# Patient Record
Sex: FEMALE | Race: WHITE | NOT HISPANIC OR LATINO | Employment: FULL TIME | URBAN - METROPOLITAN AREA
[De-identification: names, ages, dates, MRNs, and addresses within clinical notes are randomized per-mention and may not be internally consistent; named-entity substitution may affect disease eponyms.]

---
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
## 2018-05-17 ENCOUNTER — HOSPITAL ENCOUNTER (EMERGENCY)
Facility: HOSPITAL | Age: 18
Discharge: HOME | End: 2018-05-17
Attending: EMERGENCY MEDICINE | Admitting: EMERGENCY MEDICINE

## 2018-05-17 VITALS
DIASTOLIC BLOOD PRESSURE: 96 MMHG | TEMPERATURE: 98.1 F | WEIGHT: 102 LBS | SYSTOLIC BLOOD PRESSURE: 150 MMHG | HEIGHT: 62 IN | HEART RATE: 101 BPM | OXYGEN SATURATION: 98 % | RESPIRATION RATE: 16 BRPM | BODY MASS INDEX: 18.77 KG/M2

## 2018-05-17 DIAGNOSIS — R31.9 HEMATURIA, UNSPECIFIED TYPE: ICD-10-CM

## 2018-05-17 DIAGNOSIS — N39.0 ACUTE UTI: Primary | ICD-10-CM

## 2018-05-17 LAB
B-HCG UR QL: NEGATIVE
BACTERIA UR QL AUTO: ABNORMAL /HPF
BILIRUB UR QL STRIP: NEGATIVE
CLARITY UR: ABNORMAL
COLOR UR: YELLOW
GLUCOSE UR STRIP-MCNC: NEGATIVE MG/DL
HGB UR QL STRIP.AUTO: ABNORMAL
HYALINE CASTS UR QL AUTO: ABNORMAL /LPF
KETONES UR QL STRIP: NEGATIVE
LEUKOCYTE ESTERASE UR QL STRIP.AUTO: ABNORMAL
NITRITE UR QL STRIP: POSITIVE
PH UR STRIP.AUTO: 5.5 [PH] (ref 5–8)
PROT UR QL STRIP: ABNORMAL
RBC # UR: ABNORMAL /HPF
REF LAB TEST METHOD: ABNORMAL
SP GR UR STRIP: 1.02 (ref 1–1.03)
SQUAMOUS #/AREA URNS HPF: ABNORMAL /HPF
UROBILINOGEN UR QL STRIP: ABNORMAL
WBC UR QL AUTO: ABNORMAL /HPF

## 2018-05-17 PROCEDURE — 87186 SC STD MICRODIL/AGAR DIL: CPT | Performed by: EMERGENCY MEDICINE

## 2018-05-17 PROCEDURE — 87086 URINE CULTURE/COLONY COUNT: CPT | Performed by: EMERGENCY MEDICINE

## 2018-05-17 PROCEDURE — 81001 URINALYSIS AUTO W/SCOPE: CPT | Performed by: EMERGENCY MEDICINE

## 2018-05-17 PROCEDURE — 81025 URINE PREGNANCY TEST: CPT | Performed by: EMERGENCY MEDICINE

## 2018-05-17 PROCEDURE — 99283 EMERGENCY DEPT VISIT LOW MDM: CPT

## 2018-05-17 RX ORDER — PHENAZOPYRIDINE HYDROCHLORIDE 100 MG/1
100 TABLET, FILM COATED ORAL 3 TIMES DAILY PRN
Qty: 6 TABLET | Refills: 0 | Status: SHIPPED | OUTPATIENT
Start: 2018-05-17 | End: 2018-09-26

## 2018-05-17 RX ORDER — CEPHALEXIN 500 MG/1
500 CAPSULE ORAL 4 TIMES DAILY
Qty: 28 CAPSULE | Refills: 0 | Status: SHIPPED | OUTPATIENT
Start: 2018-05-17 | End: 2018-05-24

## 2018-05-17 NOTE — DISCHARGE INSTRUCTIONS
You are advised to follow closely with Dr. Andrade in 2-3 days for recheck, urine culture results, final results of lab work, and further testing/treatment as needed.    Drink at least 8-10 glasses of water daily.  Take probiotics during and after antibiotic use.  Some antibiotics may decrease effectiveness of birth control therefore you must use an additional method of birth control or abstinence while taking antibiotics    Please return to the emergency department immediately with chest pain different than usual for you, shortness of air, abdominal pain, persistent vomiting/fever, blood in emesis or stool, lightheadedness/fainting, problems with speech, one sided weakness/numbness, new incontinence, problems with vision,  or for worsening of symptoms or other concerns.

## 2018-05-17 NOTE — ED PROVIDER NOTES
EMERGENCY DEPARTMENT ENCOUNTER    CHIEF COMPLAINT  Chief Complaint: blood in urine  History given by: pt  History limited by: nothing  Room Number: 45/45  PMD: Usama Andrade MD      HPI:  Pt is a 17 y.o. female who presents complaining of blood in urine. Pt reports that she had an increased frequency and urgency starting last night, including 5x last night and 7-10x this morning. The last time she urinating at work she noticed blood and it burned. She has minor suprapubic abd pain. She denies any other sx. Her last day of menstrual period was May 1st, but she does not believe she could be pregnant. She does not smoke, drink, or take any drugs.    Duration:  1 day  Onset: gradual  Timing: intermittent  Location: urinary sx  Radiation: none  Quality: blood and burning sensation  Intensity/Severity: moderate  Progression: worsening  Associated Symptoms: dysuria, abd pain, frequency, urgency  Aggravating Factors: none  Alleviating Factors: none  Previous Episodes: No previous episodes noted.  Treatment before arrival: No tx prior to arrival noted.    PAST MEDICAL HISTORY  Active Ambulatory Problems     Diagnosis Date Noted   • No Active Ambulatory Problems     Resolved Ambulatory Problems     Diagnosis Date Noted   • No Resolved Ambulatory Problems     Past Medical History:   Diagnosis Date   • Anemia    • Hypertension        PAST SURGICAL HISTORY  History reviewed. No pertinent surgical history.    FAMILY HISTORY  History reviewed. No pertinent family history.    SOCIAL HISTORY  Social History     Social History   • Marital status: Single     Spouse name: N/A   • Number of children: N/A   • Years of education: N/A     Occupational History   • Not on file.     Social History Main Topics   • Smoking status: Current Every Day Smoker     Packs/day: 0.50     Years: 1.00     Types: Cigarettes   • Smokeless tobacco: Not on file   • Alcohol use Yes   • Drug use: No   • Sexual activity: Not on file     Other Topics  Concern   • Not on file     Social History Narrative   • No narrative on file       ALLERGIES  Patient has no known allergies.    REVIEW OF SYSTEMS  Review of Systems   Constitutional: Negative for chills and fever.   HENT: Negative for rhinorrhea and sore throat.    Eyes: Negative for visual disturbance.   Respiratory: Negative for cough and shortness of breath.    Cardiovascular: Negative for chest pain, palpitations and leg swelling.   Gastrointestinal: Positive for abdominal pain (suprapubic). Negative for diarrhea and vomiting.   Endocrine: Negative.    Genitourinary: Positive for dysuria, frequency, hematuria (x1) and urgency. Negative for decreased urine volume.   Musculoskeletal: Negative for neck pain.   Skin: Negative for rash.   Neurological: Negative for syncope and headaches.   Psychiatric/Behavioral: Negative.    All other systems reviewed and are negative.      PHYSICAL EXAM  ED Triage Vitals   Temp Heart Rate Resp BP SpO2   05/17/18 1359 05/17/18 1359 05/17/18 1359 05/17/18 1414 05/17/18 1359   98.1 °F (36.7 °C) 101 16 150/96 98 %      Temp src Heart Rate Source Patient Position BP Location FiO2 (%)   05/17/18 1359 -- -- -- --   Tympanic           Physical Exam   Constitutional: She is oriented to person, place, and time.  Non-toxic appearance. She appears distressed (mild).   HENT:   Head: Normocephalic and atraumatic.   Eyes: EOM are normal.   Neck: Normal range of motion. Neck supple.   Cardiovascular: Normal rate, regular rhythm, normal heart sounds and intact distal pulses.    No murmur heard.  Pulses:       Posterior tibial pulses are 2+ on the right side, and 2+ on the left side.   Pulmonary/Chest: Effort normal and breath sounds normal. No respiratory distress.   Abdominal: Soft. Bowel sounds are normal. There is tenderness in the suprapubic area. There is no rebound, no guarding and no CVA tenderness.   Musculoskeletal: Normal range of motion. She exhibits no edema.   Neurological: She is  alert and oriented to person, place, and time.   Skin: Skin is warm and dry.   Psychiatric: Affect normal.   Nursing note and vitals reviewed.      LAB RESULTS  Lab Results (last 24 hours)     Procedure Component Value Units Date/Time    Urinalysis With / Culture If Indicated - Urine, Clean Catch [[id number]]  (Abnormal) Collected:  05/17/18 1433    Specimen:  Urine from Urine, Clean Catch Updated:  05/17/18 1457     Color, UA Yellow     Appearance, UA Cloudy (A)     pH, UA 5.5     Specific Gravity, UA 1.017     Glucose, UA Negative     Ketones, UA Negative     Bilirubin, UA Negative     Blood, UA Large (3+) (A)     Protein, UA >=300 mg/dL (3+) (A)     Leuk Esterase, UA Moderate (2+) (A)     Nitrite, UA Positive (A)     Urobilinogen, UA 0.2 E.U./dL    Pregnancy, Urine - Urine, Clean Catch [[id number]]  (Normal) Collected:  05/17/18 1433    Specimen:  Urine from Urine, Clean Catch Updated:  05/17/18 1453     HCG, Urine QL Negative    Urinalysis, Microscopic Only - Urine, Clean Catch [[id number]]  (Abnormal) Collected:  05/17/18 1433    Specimen:  Urine from Urine, Clean Catch Updated:  05/17/18 1459     RBC, UA Too Numerous to Count (A) /HPF      WBC, UA Too Numerous to Count (A) /HPF      Bacteria, UA 4+ (A) /HPF      Squamous Epithelial Cells, UA 0-2 /HPF      Hyaline Casts, UA 3-6 /LPF      Methodology Automated Microscopy    Urine Culture - Urine, Urine, Clean Catch [[id number]] Collected:  05/17/18 1433    Specimen:  Urine from Urine, Clean Catch Updated:  05/17/18 1457          I ordered the above labs and reviewed the results      PROCEDURES  Procedures      PROGRESS AND CONSULTS     1444- Initial pt check. I advised her that we will check her urinalysis when it returns, but she likely has a UTI. I encouraged her to consume large amounts of fluids, wipe front to back, and to cleanse before and after intercourse as well.    1530- Pt recheck. I explained the results, noting that pt has a UTI. I will provide pt with  abx upon discharge, and I encouraged her to take a probiotic while on abx. In addition, I warned her that abx can decrease the effect of oral contraceptive, so I encouraged her to utilize an alternative method while on abx. I encouraged the pt to consume copious amounts of water as well. Pt understands and agrees with the plan, all questions answered.    MEDICAL DECISION MAKING  Results were reviewed/discussed with the patient and they were also made aware of online access. Pt also made aware that some labs, such as cultures, will not be resulted during ER visit and follow up with PMD is necessary.     MDM  Number of Diagnoses or Management Options  Acute UTI:      Amount and/or Complexity of Data Reviewed  Clinical lab tests: ordered and reviewed (Urinalysis- RBC- too numerous- WBC- too numerous, Bacteria- 4+)  Decide to obtain previous medical records or to obtain history from someone other than the patient: yes  Review and summarize past medical records: yes           DIAGNOSIS  Final diagnoses:   Acute UTI   Hematuria, unspecified type       DISPOSITION  DISCHARGE    Patient discharged in stable condition.    Reviewed implications of results, diagnosis, meds, responsibility to follow up, warning signs and symptoms of possible worsening, potential complications and reasons to return to ER, including severe worsening of sx or development of associated sx.    Patient/Family voiced understanding of above instructions.    Discussed plan for discharge, as there is no emergent indication for admission. Patient referred to primary care provider for BP management due to today's BP. Pt/family is agreeable and understands need for follow up and repeat testing.  Pt is aware that discharge does not mean that nothing is wrong but it indicates no emergency is present that requires admission and they must continue care with follow-up as given below or physician of their choice.     FOLLOW-UP  Usama Andrade MD  [id number]  [facility] [address]  [phone]    Schedule an appointment as soon as possible for a visit in 3 days  EVEN IF WELL         Medication List      New Prescriptions    cephalexin 500 MG capsule  Commonly known as:  KEFLEX  Take 1 capsule by mouth 4 (Four) Times a Day for 7 days.     phenazopyridine 100 MG tablet  Commonly known as:  PYRIDIUM  Take 1 tablet by mouth 3 (Three) Times a Day As Needed for bladder spasms.   Must fill keflex              Latest Documented Vital Signs:  As of 3:26 PM  BP- 150/96 HR- 101 Temp- 98.1 °F (36.7 °C) (Tympanic) O2 sat- 98%    --  Documentation assistance provided by aleisha Srivastava for Dr. Neal.  Information recorded by the scribe was done at my direction and has been verified and validated by me.       Tejas Srivastava  05/17/18 1526       Antonette Neal MD  05/19/18 0022

## 2018-05-19 LAB — BACTERIA SPEC AEROBE CULT: ABNORMAL

## 2018-09-26 ENCOUNTER — HOSPITAL ENCOUNTER (EMERGENCY)
Facility: HOSPITAL | Age: 18
Discharge: HOME | End: 2018-09-26
Attending: EMERGENCY MEDICINE | Admitting: EMERGENCY MEDICINE

## 2018-09-26 VITALS
BODY MASS INDEX: 17.48 KG/M2 | DIASTOLIC BLOOD PRESSURE: 99 MMHG | HEART RATE: 81 BPM | RESPIRATION RATE: 16 BRPM | SYSTOLIC BLOOD PRESSURE: 130 MMHG | OXYGEN SATURATION: 97 % | HEIGHT: 62 IN | WEIGHT: 95 LBS | TEMPERATURE: 97.5 F

## 2018-09-26 DIAGNOSIS — A08.4 VIRAL GASTROENTERITIS: Primary | ICD-10-CM

## 2018-09-26 LAB
ALBUMIN SERPL-MCNC: 4.1 G/DL (ref 3.5–5.2)
ALBUMIN/GLOB SERPL: 1.5 G/DL
ALP SERPL-CCNC: 64 U/L (ref 39–117)
ALT SERPL W P-5'-P-CCNC: 11 U/L (ref 1–33)
ANION GAP SERPL CALCULATED.3IONS-SCNC: 10.7 MMOL/L
AST SERPL-CCNC: 10 U/L (ref 1–32)
BASOPHILS # BLD AUTO: 0.02 10*3/MM3 (ref 0–0.2)
BASOPHILS NFR BLD AUTO: 0.4 % (ref 0–1.5)
BILIRUB SERPL-MCNC: <0.2 MG/DL (ref 0.1–1.2)
BILIRUB UR QL STRIP: NEGATIVE
BUN BLD-MCNC: 12 MG/DL (ref 6–20)
BUN/CREAT SERPL: 16.7 (ref 7–25)
CALCIUM SPEC-SCNC: 9.1 MG/DL (ref 8.6–10.5)
CHLORIDE SERPL-SCNC: 107 MMOL/L (ref 98–107)
CLARITY UR: CLEAR
CO2 SERPL-SCNC: 23.3 MMOL/L (ref 22–29)
COLOR UR: YELLOW
CREAT BLD-MCNC: 0.72 MG/DL (ref 0.57–1)
DEPRECATED RDW RBC AUTO: 43.1 FL (ref 37–54)
EOSINOPHIL # BLD AUTO: 0.06 10*3/MM3 (ref 0–0.7)
EOSINOPHIL NFR BLD AUTO: 1.1 % (ref 0.3–6.2)
ERYTHROCYTE [DISTWIDTH] IN BLOOD BY AUTOMATED COUNT: 13.6 % (ref 11.7–13)
GFR SERPL CREATININE-BSD FRML MDRD: 103 ML/MIN/1.73
GLOBULIN UR ELPH-MCNC: 2.8 GM/DL
GLUCOSE BLD-MCNC: 108 MG/DL (ref 65–99)
GLUCOSE UR STRIP-MCNC: NEGATIVE MG/DL
HCG SERPL QL: NEGATIVE
HCT VFR BLD AUTO: 42.6 % (ref 35.6–45.5)
HGB BLD-MCNC: 13.6 G/DL (ref 11.9–15.5)
HGB UR QL STRIP.AUTO: NEGATIVE
HOLD SPECIMEN: NORMAL
HOLD SPECIMEN: NORMAL
IMM GRANULOCYTES # BLD: 0.02 10*3/MM3 (ref 0–0.03)
IMM GRANULOCYTES NFR BLD: 0.4 % (ref 0–0.5)
KETONES UR QL STRIP: NEGATIVE
LEUKOCYTE ESTERASE UR QL STRIP.AUTO: NEGATIVE
LIPASE SERPL-CCNC: 36 U/L (ref 13–60)
LYMPHOCYTES # BLD AUTO: 1.92 10*3/MM3 (ref 0.9–4.8)
LYMPHOCYTES NFR BLD AUTO: 34 % (ref 19.6–45.3)
MCH RBC QN AUTO: 27.6 PG (ref 26.9–32)
MCHC RBC AUTO-ENTMCNC: 31.9 G/DL (ref 32.4–36.3)
MCV RBC AUTO: 86.4 FL (ref 80.5–98.2)
MONOCYTES # BLD AUTO: 0.39 10*3/MM3 (ref 0.2–1.2)
MONOCYTES NFR BLD AUTO: 6.9 % (ref 5–12)
NEUTROPHILS # BLD AUTO: 3.25 10*3/MM3 (ref 1.9–8.1)
NEUTROPHILS NFR BLD AUTO: 57.6 % (ref 42.7–76)
NITRITE UR QL STRIP: NEGATIVE
PH UR STRIP.AUTO: 5.5 [PH] (ref 5–8)
PLATELET # BLD AUTO: 213 10*3/MM3 (ref 140–500)
PMV BLD AUTO: 12 FL (ref 6–12)
POTASSIUM BLD-SCNC: 4.5 MMOL/L (ref 3.5–5.2)
PROT SERPL-MCNC: 6.9 G/DL (ref 6–8.5)
PROT UR QL STRIP: NEGATIVE
RBC # BLD AUTO: 4.93 10*6/MM3 (ref 3.9–5.2)
SODIUM BLD-SCNC: 141 MMOL/L (ref 136–145)
SP GR UR STRIP: 1.01 (ref 1–1.03)
UROBILINOGEN UR QL STRIP: NORMAL
WBC NRBC COR # BLD: 5.64 10*3/MM3 (ref 4.5–10.7)
WHOLE BLOOD HOLD SPECIMEN: NORMAL
WHOLE BLOOD HOLD SPECIMEN: NORMAL

## 2018-09-26 PROCEDURE — 36415 COLL VENOUS BLD VENIPUNCTURE: CPT

## 2018-09-26 PROCEDURE — 83690 ASSAY OF LIPASE: CPT | Performed by: PHYSICIAN ASSISTANT

## 2018-09-26 PROCEDURE — 80053 COMPREHEN METABOLIC PANEL: CPT | Performed by: PHYSICIAN ASSISTANT

## 2018-09-26 PROCEDURE — 84703 CHORIONIC GONADOTROPIN ASSAY: CPT | Performed by: PHYSICIAN ASSISTANT

## 2018-09-26 PROCEDURE — 85025 COMPLETE CBC W/AUTO DIFF WBC: CPT | Performed by: PHYSICIAN ASSISTANT

## 2018-09-26 PROCEDURE — 81003 URINALYSIS AUTO W/O SCOPE: CPT | Performed by: PHYSICIAN ASSISTANT

## 2018-09-26 PROCEDURE — 99283 EMERGENCY DEPT VISIT LOW MDM: CPT

## 2018-09-26 RX ORDER — DICYCLOMINE HCL 20 MG
20 TABLET ORAL EVERY 6 HOURS PRN
Qty: 20 TABLET | Refills: 0 | OUTPATIENT
Start: 2018-09-26 | End: 2018-11-29

## 2018-09-26 RX ORDER — ONDANSETRON 4 MG/1
4 TABLET, ORALLY DISINTEGRATING ORAL EVERY 6 HOURS PRN
Qty: 10 TABLET | Refills: 0 | OUTPATIENT
Start: 2018-09-26 | End: 2018-11-29

## 2018-11-29 ENCOUNTER — HOSPITAL ENCOUNTER (EMERGENCY)
Facility: HOSPITAL | Age: 18
Discharge: HOME | End: 2018-11-29
Attending: EMERGENCY MEDICINE | Admitting: EMERGENCY MEDICINE

## 2018-11-29 VITALS
HEART RATE: 60 BPM | WEIGHT: 97.1 LBS | RESPIRATION RATE: 16 BRPM | OXYGEN SATURATION: 99 % | BODY MASS INDEX: 17.87 KG/M2 | SYSTOLIC BLOOD PRESSURE: 128 MMHG | DIASTOLIC BLOOD PRESSURE: 90 MMHG | TEMPERATURE: 98.1 F | HEIGHT: 62 IN

## 2018-11-29 DIAGNOSIS — S61.309A PARTIAL AVULSION OF FINGERNAIL, INITIAL ENCOUNTER: Primary | ICD-10-CM

## 2018-11-29 PROCEDURE — 99283 EMERGENCY DEPT VISIT LOW MDM: CPT

## 2018-11-29 PROCEDURE — 11730 AVULSION NAIL PLATE SIMPLE 1: CPT

## 2018-11-29 RX ORDER — BUPIVACAINE HYDROCHLORIDE 5 MG/ML
10 INJECTION, SOLUTION EPIDURAL; INTRACAUDAL ONCE
Status: COMPLETED | OUTPATIENT
Start: 2018-11-29 | End: 2018-11-29

## 2018-11-29 RX ADMIN — BUPIVACAINE HYDROCHLORIDE 10 ML: 5 INJECTION, SOLUTION EPIDURAL; INTRACAUDAL at 08:38

## 2019-10-15 ENCOUNTER — HOSPITAL ENCOUNTER (EMERGENCY)
Facility: HOSPITAL | Age: 19
Discharge: HOME | End: 2019-10-15
Attending: EMERGENCY MEDICINE | Admitting: EMERGENCY MEDICINE

## 2019-10-15 VITALS
SYSTOLIC BLOOD PRESSURE: 133 MMHG | TEMPERATURE: 99.5 F | OXYGEN SATURATION: 98 % | WEIGHT: 104 LBS | HEIGHT: 62 IN | BODY MASS INDEX: 19.14 KG/M2 | RESPIRATION RATE: 14 BRPM | DIASTOLIC BLOOD PRESSURE: 84 MMHG | HEART RATE: 80 BPM

## 2019-10-15 DIAGNOSIS — N72 CERVICITIS: Primary | ICD-10-CM

## 2019-10-15 DIAGNOSIS — K59.00 CONSTIPATION, UNSPECIFIED CONSTIPATION TYPE: ICD-10-CM

## 2019-10-15 DIAGNOSIS — N39.0 URINARY TRACT INFECTION, ACUTE: ICD-10-CM

## 2019-10-15 LAB
B-HCG UR QL: NEGATIVE
BACTERIA UR QL AUTO: ABNORMAL /HPF
BILIRUB UR QL STRIP: NEGATIVE
CLARITY UR: CLEAR
CLUE CELLS SPEC QL WET PREP: ABNORMAL
COLOR UR: YELLOW
GLUCOSE UR STRIP-MCNC: NEGATIVE MG/DL
HGB UR QL STRIP.AUTO: ABNORMAL
HYALINE CASTS UR QL AUTO: ABNORMAL /LPF
HYDATID CYST SPEC WET PREP: ABNORMAL
KETONES UR QL STRIP: NEGATIVE
LEUKOCYTE ESTERASE UR QL STRIP.AUTO: ABNORMAL
NITRITE UR QL STRIP: NEGATIVE
PH UR STRIP.AUTO: 7.5 [PH] (ref 5–8)
PROT UR QL STRIP: NEGATIVE
RBC # UR: ABNORMAL /HPF
REF LAB TEST METHOD: ABNORMAL
SP GR UR STRIP: 1.01 (ref 1–1.03)
SQUAMOUS #/AREA URNS HPF: ABNORMAL /HPF
T VAGINALIS SPEC QL WET PREP: ABNORMAL
UROBILINOGEN UR QL STRIP: ABNORMAL
WBC SPEC QL WET PREP: ABNORMAL
WBC UR QL AUTO: ABNORMAL /HPF
YEAST GENITAL QL WET PREP: ABNORMAL

## 2019-10-15 PROCEDURE — 87591 N.GONORRHOEAE DNA AMP PROB: CPT | Performed by: EMERGENCY MEDICINE

## 2019-10-15 PROCEDURE — 87491 CHLMYD TRACH DNA AMP PROBE: CPT | Performed by: EMERGENCY MEDICINE

## 2019-10-15 PROCEDURE — 25010000002 CEFTRIAXONE PER 250 MG: Performed by: EMERGENCY MEDICINE

## 2019-10-15 PROCEDURE — 96372 THER/PROPH/DIAG INJ SC/IM: CPT

## 2019-10-15 PROCEDURE — 81025 URINE PREGNANCY TEST: CPT | Performed by: EMERGENCY MEDICINE

## 2019-10-15 PROCEDURE — 99284 EMERGENCY DEPT VISIT MOD MDM: CPT

## 2019-10-15 PROCEDURE — 87086 URINE CULTURE/COLONY COUNT: CPT | Performed by: EMERGENCY MEDICINE

## 2019-10-15 PROCEDURE — 81001 URINALYSIS AUTO W/SCOPE: CPT | Performed by: EMERGENCY MEDICINE

## 2019-10-15 PROCEDURE — 87210 SMEAR WET MOUNT SALINE/INK: CPT | Performed by: EMERGENCY MEDICINE

## 2019-10-15 RX ORDER — CEFTRIAXONE SODIUM 250 MG/1
250 INJECTION, POWDER, FOR SOLUTION INTRAMUSCULAR; INTRAVENOUS ONCE
Status: COMPLETED | OUTPATIENT
Start: 2019-10-15 | End: 2019-10-15

## 2019-10-15 RX ORDER — NITROFURANTOIN 25; 75 MG/1; MG/1
100 CAPSULE ORAL 2 TIMES DAILY
Qty: 10 CAPSULE | Refills: 0 | Status: SHIPPED | OUTPATIENT
Start: 2019-10-15 | End: 2020-07-17

## 2019-10-15 RX ORDER — LIDOCAINE HYDROCHLORIDE 10 MG/ML
0.9 INJECTION, SOLUTION EPIDURAL; INFILTRATION; INTRACAUDAL; PERINEURAL ONCE
Status: COMPLETED | OUTPATIENT
Start: 2019-10-15 | End: 2019-10-15

## 2019-10-15 RX ORDER — AZITHROMYCIN 250 MG/1
1000 TABLET, FILM COATED ORAL ONCE
Status: COMPLETED | OUTPATIENT
Start: 2019-10-15 | End: 2019-10-15

## 2019-10-15 RX ADMIN — LIDOCAINE HYDROCHLORIDE 0.9 ML: 10 INJECTION, SOLUTION EPIDURAL; INFILTRATION; INTRACAUDAL; PERINEURAL at 17:11

## 2019-10-15 RX ADMIN — AZITHROMYCIN 1000 MG: 250 TABLET, FILM COATED ORAL at 17:10

## 2019-10-15 RX ADMIN — CEFTRIAXONE SODIUM 250 MG: 250 INJECTION, POWDER, FOR SOLUTION INTRAMUSCULAR; INTRAVENOUS at 17:11

## 2019-10-15 NOTE — ED NOTES
Patient to er from home with c/o burning on urination and constipation.      Wild Cordero RN  10/15/19 9097

## 2019-10-15 NOTE — DISCHARGE INSTRUCTIONS
You are advised to follow closely with Dr. Andrade and Dr. Collins or gynecologist of your choice in 2-3 days for recheck, pelvic culture results, urine culture results final results of lab work and imaging testing, and further testing/treatment as needed.    Drink at least 8 glasses of water daily.  Follow-up for pelvic culture results, if they are positive your sexual partners need to be treated tested and treated.  If these tests are positive you and your partners also need to be tested and treated as needed for other STDs such as hepatitis, syphilis, HIV.  Practice safe sex.    Eat a diet full of fruits and vegetables.    Please return to the emergency department immediately with chest pain different than usual for you, shortness of air, abdominal pain, persistent vomiting/fever, blood in emesis or stool, lightheadedness/fainting, problems with speech, one sided weakness/numbness, new incontinence, problems with vision,  or for worsening of symptoms or other concerns.

## 2019-10-15 NOTE — ED PROVIDER NOTES
EMERGENCY DEPARTMENT ENCOUNTER    CHIEF COMPLAINT  Chief Complaint: Dysuria  History given by: Patient  History limited by:   Room Number: 01/01  PMD: Usama Andrade MD      HPI:  Pt is a 19 y.o. female who presents complaining of dysuria that began 3 days ago. She also reports having some frequency and urgency. She states having some vaginal discharge. Pt reports a subjective fever. She has been nauseated. Pt also c/o constipation. Last BM was 2 days ago. She has had a cough as well that is nonproductive. No CP, fever, vomiting, SOA, rash, lesion. There is possible STD exposure.     Duration: 3 days  Onset: sudden  Timing: intermittent  Quality: burning  Intensity/Severity: moderate  Progression: unchanged  Associated Symptoms: frequency, urgency, vaginal discharge, constipation, cough, fever (subjective).  Aggravating Factors: urination  Alleviating Factors: none  Previous Episodes: hx of UTI  Treatment before arrival: none    PAST MEDICAL HISTORY  Active Ambulatory Problems     Diagnosis Date Noted   • No Active Ambulatory Problems     Resolved Ambulatory Problems     Diagnosis Date Noted   • No Resolved Ambulatory Problems     Past Medical History:   Diagnosis Date   • Anemia    • Hypertension    • UTI (urinary tract infection)        PAST SURGICAL HISTORY  History reviewed. No pertinent surgical history.    FAMILY HISTORY  History reviewed. No pertinent family history.    SOCIAL HISTORY  Social History     Socioeconomic History   • Marital status: Single     Spouse name: Not on file   • Number of children: Not on file   • Years of education: Not on file   • Highest education level: Not on file   Tobacco Use   • Smoking status: Current Every Day Smoker     Packs/day: 0.10     Years: 1.00     Pack years: 0.10     Types: Cigarettes   Substance and Sexual Activity   • Alcohol use: Yes   • Drug use: No       ALLERGIES  Patient has no known allergies.    REVIEW OF SYSTEMS  Review of Systems    Constitutional: Positive for fever (subjective).   HENT: Negative for sore throat.    Eyes: Negative.    Respiratory: Positive for cough. Negative for shortness of breath.    Cardiovascular: Negative for chest pain.   Gastrointestinal: Positive for constipation. Negative for abdominal pain, diarrhea and vomiting.   Genitourinary: Positive for dysuria, frequency, urgency and vaginal discharge.   Musculoskeletal: Negative for neck pain.   Skin: Negative for rash.   Allergic/Immunologic: Negative.    Neurological: Negative for weakness, numbness and headaches.   Hematological: Negative.    Psychiatric/Behavioral: Negative.    All other systems reviewed and are negative.      PHYSICAL EXAM  ED Triage Vitals   Temp Heart Rate Resp BP SpO2   10/15/19 1450 10/15/19 1450 10/15/19 1509 10/15/19 1509 10/15/19 1450   99.5 °F (37.5 °C) 78 14 129/95 98 %      Temp src Heart Rate Source Patient Position BP Location FiO2 (%)   10/15/19 1450 -- -- -- --   Tympanic           Physical Exam   Constitutional: She is oriented to person, place, and time.  Non-toxic appearance. She appears distressed.   HENT:   Head: Normocephalic and atraumatic.   Eyes: EOM are normal. Pupils are equal, round, and reactive to light.   Neck: Normal range of motion. Neck supple.   Cardiovascular: Normal rate, regular rhythm, normal heart sounds and intact distal pulses.   Pulses:       Dorsalis pedis pulses are 2+ on the right side, and 2+ on the left side.        Posterior tibial pulses are 2+ on the right side, and 2+ on the left side.   Pulmonary/Chest: Effort normal and breath sounds normal. No respiratory distress.   Abdominal: Soft. Bowel sounds are normal. There is tenderness (mild) in the suprapubic area. There is no rebound, no guarding and no CVA tenderness.   Genitourinary: Right adnexa normal and left adnexa normal. Uterus is not enlarged. Cervix exhibits motion tenderness and tenderness. Right adnexum displays no mass and no tenderness. Left  adnexum displays no mass and no tenderness. Yellow and vaginal discharge found.   Genitourinary Comments: External genitalia nml. Chaperone present during  exam.    Musculoskeletal: Normal range of motion. She exhibits no edema or deformity.        Lumbar back: She exhibits tenderness (paravertebral bilaterally). She exhibits no bony tenderness.   Neurological: She is alert and oriented to person, place, and time. She has normal sensation and normal strength.   Skin: Skin is warm and dry. No rash noted.   Psychiatric: Mood and affect normal.   Nursing note and vitals reviewed.      LAB RESULTS  Lab Results (last 24 hours)     Procedure Component Value Units Date/Time    Urinalysis With Microscopic If Indicated (No Culture) - Urine, Clean Catch [[id number]]  (Abnormal) Collected:  10/15/19 1511    Specimen:  Urine, Clean Catch Updated:  10/15/19 1528     Color, UA Yellow     Appearance, UA Clear     pH, UA 7.5     Specific Gravity, UA 1.011     Glucose, UA Negative     Ketones, UA Negative     Bilirubin, UA Negative     Blood, UA Trace     Protein, UA Negative     Leuk Esterase, UA Small (1+)     Nitrite, UA Negative     Urobilinogen, UA 0.2 E.U./dL    Pregnancy, Urine - Urine, Clean Catch [[id number]]  (Normal) Collected:  10/15/19 1511    Specimen:  Urine, Clean Catch Updated:  10/15/19 1534     HCG, Urine QL Negative    Urinalysis, Microscopic Only - Urine, Clean Catch [[id number]]  (Abnormal) Collected:  10/15/19 1511    Specimen:  Urine, Clean Catch Updated:  10/15/19 1528     RBC, UA 0-2 /HPF      WBC, UA 13-20 /HPF      Bacteria, UA None Seen /HPF      Squamous Epithelial Cells, UA 0-2 /HPF      Hyaline Casts, UA None Seen /LPF      Methodology Automated Microscopy    Urine Culture - Urine, Urine, Clean Catch [[id number]] Collected:  10/15/19 1511    Specimen:  Urine, Clean Catch Updated:  10/15/19 1657    Chlamydia trachomatis, Neisseria gonorrhoeae, PCR - Swab, Cervix [[id number]] Collected:  10/15/19 1652     Specimen:  Swab from Cervix Updated:  10/15/19 1657    Wet Prep, Genital - Swab, Vagina [[id number]]  (Abnormal) Collected:  10/15/19 1652    Specimen:  Swab from Vagina Updated:  10/15/19 1709     YEAST No yeast seen     HYPHAL ELEMENTS No Hyphal elements seen     WBC'S 3+ WBC's seen     Clue Cells, Wet Prep No Clue cells seen     Trichomonas, Wet Prep No Trichomonas seen          I ordered the above labs and reviewed the results      PROCEDURES  Procedures      PROGRESS AND CONSULTS     4:20 PM  Ordered UA, KOH, GC Chlamydia.     Pt voices understanding of my concerns for STD, need to follow for pelvic cultures and further testing as out patient for other STDs such as hepatitis, HIV, syphilis and need to have partners tested/treated.  Pt voices understanding of need to practice safe sex including universal use of condoms to prevent transmission of STDs      MEDICAL DECISION MAKING  Results were reviewed/discussed with the patient and they were also made aware of online access. Pt also made aware that some labs, such as cultures, will not be resulted during ER visit and follow up with PMD is necessary.     MDM  Number of Diagnoses or Management Options     Amount and/or Complexity of Data Reviewed  Review and summarize past medical records: yes (Last urine culture 5/2018 grew E. Coli. Resistant to Ampicillin, Gentamycin, Tetracyclines, and Bactrim)           DIAGNOSIS  Final diagnoses:   Cervicitis       DISPOSITION  DISCHARGE    Patient discharged in stable condition.    Reviewed implications of results, diagnosis, meds, responsibility to follow up, warning signs and symptoms of possible worsening, potential complications and reasons to return to ER.    Patient/Family voiced understanding of above instructions.    Discussed plan for discharge, as there is no emergent indication for admission. Patient referred to primary care provider for BP management due to today's BP. Pt/family is agreeable and understands need  for follow up and repeat testing.  Pt is aware that discharge does not mean that nothing is wrong but it indicates no emergency is present that requires admission and they must continue care with follow-up as given below or physician of their choice.     FOLLOW-UP  Usama Andrade MD  [address] [facility] [id number] [phone]    Call   For Primary Physician follow-up    Edward Collins MD  [address] [facility] [address]  [facility] [id number] [phone]    Call  For GYN follow-up         Medication List      New Prescriptions    nitrofurantoin (macrocrystal-monohydrate) 100 MG capsule  Commonly known as:  MACROBID  Take 1 capsule by mouth 2 (Two) Times a Day.              Latest Documented Vital Signs:  As of 5:20 PM  BP- 125/87 HR- 67 Temp- 99.5 °F (37.5 °C) (Tympanic) O2 sat- 99%    --  Documentation assistance provided by aleisha Nice for Dr Neal.  Information recorded by the scribe was done at my direction and has been verified and validated by me.          Steve Nice  10/15/19 4638       Antonette Neal MD  10/19/19 0969

## 2019-10-16 LAB — BACTERIA SPEC AEROBE CULT: NO GROWTH

## 2019-10-17 LAB
C TRACH RRNA SPEC DONR QL NAA+PROBE: POSITIVE
N GONORRHOEA DNA SPEC QL NAA+PROBE: POSITIVE

## 2019-10-18 ENCOUNTER — TELEPHONE (OUTPATIENT)
Dept: SOCIAL WORK | Facility: HOSPITAL | Age: 19
End: 2019-10-18

## 2019-10-19 NOTE — PROGRESS NOTES
Follow-up call per ER MD request; spoke with patient's mother.  Left message requesting return call at patient's convenience.    Patient returned call and spoke with CHIDI Monte who advised patient of culture results and further need for follow-up care.  Jamaica Woodruff RN

## 2020-07-17 ENCOUNTER — INITIAL PRENATAL (OUTPATIENT)
Dept: OBSTETRICS AND GYNECOLOGY | Facility: CLINIC | Age: 20
End: 2020-07-17

## 2020-07-17 VITALS — DIASTOLIC BLOOD PRESSURE: 78 MMHG | BODY MASS INDEX: 19.2 KG/M2 | WEIGHT: 105 LBS | SYSTOLIC BLOOD PRESSURE: 120 MMHG

## 2020-07-17 DIAGNOSIS — O99.331 TOBACCO USE IN PREGNANCY, ANTEPARTUM, FIRST TRIMESTER: ICD-10-CM

## 2020-07-17 DIAGNOSIS — Z11.3 SCREEN FOR STD (SEXUALLY TRANSMITTED DISEASE): ICD-10-CM

## 2020-07-17 DIAGNOSIS — O26.891 HEADACHE IN PREGNANCY, ANTEPARTUM, FIRST TRIMESTER: ICD-10-CM

## 2020-07-17 DIAGNOSIS — R51.9 HEADACHE IN PREGNANCY, ANTEPARTUM, FIRST TRIMESTER: ICD-10-CM

## 2020-07-17 DIAGNOSIS — Z12.4 SCREENING FOR CERVICAL CANCER: ICD-10-CM

## 2020-07-17 DIAGNOSIS — Z78.9 VARICELLA VACCINATION STATUS UNKNOWN: ICD-10-CM

## 2020-07-17 DIAGNOSIS — Z34.01 ENCOUNTER FOR SUPERVISION OF NORMAL FIRST PREGNANCY IN FIRST TRIMESTER: Primary | ICD-10-CM

## 2020-07-17 LAB
GLUCOSE UR STRIP-MCNC: NEGATIVE MG/DL
PROT UR STRIP-MCNC: NEGATIVE MG/DL

## 2020-07-17 PROCEDURE — 99203 OFFICE O/P NEW LOW 30 MIN: CPT | Performed by: OBSTETRICS & GYNECOLOGY

## 2020-07-17 RX ORDER — SWAB
1 SWAB, NON-MEDICATED MISCELLANEOUS DAILY
Qty: 90 EACH | Refills: 3 | Status: SHIPPED | OUTPATIENT
Start: 2020-07-17 | End: 2020-12-15 | Stop reason: SDUPTHER

## 2020-07-17 RX ORDER — PROPRANOLOL HCL 60 MG
60 CAPSULE, EXTENDED RELEASE 24HR ORAL DAILY
COMMUNITY
Start: 2019-12-10 | End: 2020-07-17

## 2020-07-17 RX ORDER — MAGNESIUM OXIDE 400 MG/1
400 TABLET ORAL DAILY
Qty: 30 TABLET | Refills: 3 | Status: SHIPPED | OUTPATIENT
Start: 2020-07-17 | End: 2020-09-18

## 2020-07-17 NOTE — PROGRESS NOTES
Initial ob visit     CC- Here for care of pregnancy     Anaya Conrad is being seen today for her first obstetrical visit.  She is a 20 y.o.    5w3d gestation.     #: 1, Date: None, Sex: None, Weight: None, GA: None, Delivery: None, Apgar1: None, Apgar5: None, Living: None, Birth Comments: None      Current obstetric complaints : backache   Prior obstetric issues, potential pregnancy concerns: Migraines, HTN  Family history of genetic issues (includes FOB): none  Prior infections concerning in pregnancy (Rash, fever in last 2 weeks): non  Varicella Hx -negative history  Prior testing for Cystic Fibrosis Carrier or Sickle Cell Trait- unknown status  Prepregnancy BMI - Body mass index is 19.2 kg/m².    Past Medical History:   Diagnosis Date   • Anemia    • Hypertension    • Migraines    • UTI (urinary tract infection)        History reviewed. No pertinent surgical history.      Current Outpatient Medications:   •  propranolol LA (INDERAL LA) 60 MG 24 hr capsule, Take 60 mg by mouth Daily., Disp: , Rfl:     No Known Allergies    Social History     Socioeconomic History   • Marital status: Single     Spouse name: Not on file   • Number of children: Not on file   • Years of education: Not on file   • Highest education level: Not on file   Tobacco Use   • Smoking status: Current Every Day Smoker     Packs/day: 0.10     Years: 1.00     Pack years: 0.10     Types: Cigarettes   • Smokeless tobacco: Never Used   Substance and Sexual Activity   • Alcohol use: Not Currently   • Drug use: No   • Sexual activity: Yes     Partners: Male       Family History   Problem Relation Age of Onset   • Breast cancer Neg Hx    • Ovarian cancer Neg Hx    • Uterine cancer Neg Hx    • Colon cancer Neg Hx    • Deep vein thrombosis Neg Hx    • Pulmonary embolism Neg Hx        Review of systems     Constitutional : Nausea, fatigue No nausea, Fatigue present    : Vaginal bleeding, cramping none, cramping present   Breast Tenderness : yes    A comprehensive review of systems was negative.     Objective    /78   Wt 47.6 kg (105 lb)   LMP 06/09/2020   BMI 19.20 kg/m²       General Appearance:    Alert, cooperative, in no acute distress, habitus thin    Head:    Normocephalic, without obvious abnormality, atraumatic   Eyes:            Lids and lashes normal, conjunctivae and sclerae normal, no   icterus, no pallor, corneas clear   Ears:    Ears appear intact with no abnormalities noted       Neck:   No adenopathy, supple, trachea midline, no thyromegaly   Back:     No kyphosis present, no scoliosis present,                       Lungs:     Clear to auscultation,respirations regular, even and     unlabored    Heart:    Regular rhythm and normal rate, normal S1 and S2, no            murmur, no gallop, no rub, no click   Breast Exam:    No masses, No nipple discharge   Abdomen:     Normal bowel sounds, no masses, no organomegaly, soft        non-tender, non-distended, no guarding, no rebound                 tenderness   Genitalia:    Vulva - BUS-WNL, NEFG    Vagina - No discharge, No bleeding    Cervix - No Lesions, closed     Uterus - Consistent with 6-7 weeks, anteverted     Adnexa - No mass, NT    Pelvimetry - clinically adequate, gynecoid pelvis     Extremities:   Moves all extremities well, no edema, no cyanosis, no              redness   Pulses:   Pulses palpable and equal bilaterally   Skin:   No bleeding, bruising or rash   Lymph nodes:   No palpable adenopathy   Neurologic:   Sensation intact, A&O times 3      Assessment    1) Pregnancy at 5w3d  2) tobacco use in pregnancy   Encouraged cessation   Discussed weaning off   3) Headaches in pregnancy   Trial of Mag oxide, d/c propranolol      Plan    Initial labs ordered   Ultrasound ordered   Cultures ordered    Activity recommendation : 150 minutes/week of moderate intensity aerobic activity unless we limit for bleeding, hypertension or other pregnancy complication   Patient is on Prenatal  vitamins  Problem list reviewed and updated.  Reviewed routine prenatal care with the office to include but not limited to   Zika (travel restrictions/ok to use insect repellant), not to changing cat litter, food restrictions, avoidance of alcohol, tobacco and drugs and saunas/hot tubs.   All questions answered.     Gary Rosales MD   7/17/2020  10:08

## 2020-07-18 LAB
ABO GROUP BLD: ABNORMAL
BASOPHILS # BLD AUTO: 0.1 X10E3/UL (ref 0–0.2)
BASOPHILS NFR BLD AUTO: 1 %
BLD GP AB SCN SERPL QL: NEGATIVE
EOSINOPHIL # BLD AUTO: 0.1 X10E3/UL (ref 0–0.4)
EOSINOPHIL NFR BLD AUTO: 1 %
ERYTHROCYTE [DISTWIDTH] IN BLOOD BY AUTOMATED COUNT: 12.5 % (ref 11.7–15.4)
HBV SURFACE AG SERPL QL IA: NEGATIVE
HCG INTACT+B SERPL-ACNC: 2999 MIU/ML
HCT VFR BLD AUTO: 36.7 % (ref 34–46.6)
HCV AB S/CO SERPL IA: <0.1 S/CO RATIO (ref 0–0.9)
HGB BLD-MCNC: 11.9 G/DL (ref 11.1–15.9)
HIV 1+2 AB+HIV1 P24 AG SERPL QL IA: NON REACTIVE
IMM GRANULOCYTES # BLD AUTO: 0 X10E3/UL (ref 0–0.1)
IMM GRANULOCYTES NFR BLD AUTO: 0 %
LYMPHOCYTES # BLD AUTO: 1.9 X10E3/UL (ref 0.7–3.1)
LYMPHOCYTES NFR BLD AUTO: 23 %
MCH RBC QN AUTO: 29.1 PG (ref 26.6–33)
MCHC RBC AUTO-ENTMCNC: 32.4 G/DL (ref 31.5–35.7)
MCV RBC AUTO: 90 FL (ref 79–97)
MONOCYTES # BLD AUTO: 0.7 X10E3/UL (ref 0.1–0.9)
MONOCYTES NFR BLD AUTO: 9 %
NEUTROPHILS # BLD AUTO: 5.4 X10E3/UL (ref 1.4–7)
NEUTROPHILS NFR BLD AUTO: 66 %
PLATELET # BLD AUTO: 216 X10E3/UL (ref 150–450)
RBC # BLD AUTO: 4.09 X10E6/UL (ref 3.77–5.28)
RH BLD: POSITIVE
RPR SER QL: NON REACTIVE
RUBV IGG SERPL IA-ACNC: <0.9 INDEX
VZV IGG SER IA-ACNC: <135 INDEX
WBC # BLD AUTO: 8.2 X10E3/UL (ref 3.4–10.8)

## 2020-07-19 LAB
BACTERIA UR CULT: NORMAL
BACTERIA UR CULT: NORMAL

## 2020-07-21 LAB
C TRACH RRNA SPEC QL NAA+PROBE: NEGATIVE
CONV .: NORMAL
CYTOLOGIST CVX/VAG CYTO: NORMAL
CYTOLOGY CVX/VAG DOC CYTO: NORMAL
CYTOLOGY CVX/VAG DOC THIN PREP: NORMAL
DX ICD CODE: NORMAL
HIV 1 & 2 AB SER-IMP: NORMAL
N GONORRHOEA RRNA SPEC QL NAA+PROBE: NEGATIVE
OTHER STN SPEC: NORMAL
STAT OF ADQ CVX/VAG CYTO-IMP: NORMAL
T VAGINALIS DNA SPEC QL NAA+PROBE: NEGATIVE

## 2020-07-27 ENCOUNTER — PROCEDURE VISIT (OUTPATIENT)
Dept: OBSTETRICS AND GYNECOLOGY | Facility: CLINIC | Age: 20
End: 2020-07-27

## 2020-07-27 DIAGNOSIS — Z34.91 UNCERTAIN DATES, ANTEPARTUM, FIRST TRIMESTER: Primary | ICD-10-CM

## 2020-07-27 PROCEDURE — 76817 TRANSVAGINAL US OBSTETRIC: CPT | Performed by: OBSTETRICS & GYNECOLOGY

## 2020-07-28 ENCOUNTER — HOSPITAL ENCOUNTER (EMERGENCY)
Facility: HOSPITAL | Age: 20
Discharge: HOME | End: 2020-07-29
Attending: EMERGENCY MEDICINE | Admitting: EMERGENCY MEDICINE

## 2020-07-28 ENCOUNTER — APPOINTMENT (OUTPATIENT)
Dept: ULTRASOUND IMAGING | Facility: HOSPITAL | Age: 20
End: 2020-07-28

## 2020-07-28 DIAGNOSIS — O41.8X10 SUBCHORIONIC HEMATOMA IN FIRST TRIMESTER, SINGLE OR UNSPECIFIED FETUS: ICD-10-CM

## 2020-07-28 DIAGNOSIS — O21.9 VOMITING DURING PREGNANCY: Primary | ICD-10-CM

## 2020-07-28 DIAGNOSIS — E86.0 DEHYDRATION: ICD-10-CM

## 2020-07-28 DIAGNOSIS — O46.8X1 SUBCHORIONIC HEMATOMA IN FIRST TRIMESTER, SINGLE OR UNSPECIFIED FETUS: ICD-10-CM

## 2020-07-28 PROCEDURE — 80053 COMPREHEN METABOLIC PANEL: CPT | Performed by: PHYSICIAN ASSISTANT

## 2020-07-28 PROCEDURE — 76815 OB US LIMITED FETUS(S): CPT

## 2020-07-28 PROCEDURE — 82550 ASSAY OF CK (CPK): CPT | Performed by: PHYSICIAN ASSISTANT

## 2020-07-28 PROCEDURE — 76817 TRANSVAGINAL US OBSTETRIC: CPT

## 2020-07-28 PROCEDURE — 99284 EMERGENCY DEPT VISIT MOD MDM: CPT

## 2020-07-28 PROCEDURE — 85025 COMPLETE CBC W/AUTO DIFF WBC: CPT | Performed by: PHYSICIAN ASSISTANT

## 2020-07-28 PROCEDURE — 81003 URINALYSIS AUTO W/O SCOPE: CPT | Performed by: PHYSICIAN ASSISTANT

## 2020-07-28 PROCEDURE — 93976 VASCULAR STUDY: CPT

## 2020-07-28 PROCEDURE — 84702 CHORIONIC GONADOTROPIN TEST: CPT | Performed by: PHYSICIAN ASSISTANT

## 2020-07-28 RX ORDER — SODIUM CHLORIDE 0.9 % (FLUSH) 0.9 %
10 SYRINGE (ML) INJECTION AS NEEDED
Status: DISCONTINUED | OUTPATIENT
Start: 2020-07-28 | End: 2020-07-29 | Stop reason: HOSPADM

## 2020-07-28 RX ORDER — ONDANSETRON 2 MG/ML
4 INJECTION INTRAMUSCULAR; INTRAVENOUS ONCE
Status: COMPLETED | OUTPATIENT
Start: 2020-07-28 | End: 2020-07-29

## 2020-07-28 RX ADMIN — SODIUM CHLORIDE, PRESERVATIVE FREE 10 ML: 5 INJECTION INTRAVENOUS at 23:51

## 2020-07-28 RX ADMIN — SODIUM CHLORIDE 1000 ML: 9 INJECTION, SOLUTION INTRAVENOUS at 23:52

## 2020-07-29 VITALS
TEMPERATURE: 98.4 F | SYSTOLIC BLOOD PRESSURE: 103 MMHG | DIASTOLIC BLOOD PRESSURE: 58 MMHG | HEIGHT: 62 IN | BODY MASS INDEX: 19.51 KG/M2 | WEIGHT: 106 LBS | OXYGEN SATURATION: 97 % | HEART RATE: 57 BPM | RESPIRATION RATE: 16 BRPM

## 2020-07-29 LAB
ALBUMIN SERPL-MCNC: 4.7 G/DL (ref 3.5–5.2)
ALBUMIN/GLOB SERPL: 1.9 G/DL
ALP SERPL-CCNC: 55 U/L (ref 39–117)
ALT SERPL W P-5'-P-CCNC: 16 U/L (ref 1–33)
ANION GAP SERPL CALCULATED.3IONS-SCNC: 12.5 MMOL/L (ref 5–15)
AST SERPL-CCNC: 12 U/L (ref 1–32)
BASOPHILS # BLD AUTO: 0.04 10*3/MM3 (ref 0–0.2)
BASOPHILS NFR BLD AUTO: 0.3 % (ref 0–1.5)
BILIRUB SERPL-MCNC: 0.3 MG/DL (ref 0–1.2)
BILIRUB UR QL STRIP: NEGATIVE
BUN SERPL-MCNC: 9 MG/DL (ref 6–20)
BUN/CREAT SERPL: 17 (ref 7–25)
CALCIUM SPEC-SCNC: 9.2 MG/DL (ref 8.6–10.5)
CHLORIDE SERPL-SCNC: 101 MMOL/L (ref 98–107)
CK SERPL-CCNC: 71 U/L (ref 20–180)
CLARITY UR: CLEAR
CO2 SERPL-SCNC: 22.5 MMOL/L (ref 22–29)
COLOR UR: YELLOW
CREAT SERPL-MCNC: 0.53 MG/DL (ref 0.57–1)
DEPRECATED RDW RBC AUTO: 37.6 FL (ref 37–54)
EOSINOPHIL # BLD AUTO: 0.03 10*3/MM3 (ref 0–0.4)
EOSINOPHIL NFR BLD AUTO: 0.3 % (ref 0.3–6.2)
ERYTHROCYTE [DISTWIDTH] IN BLOOD BY AUTOMATED COUNT: 12.2 % (ref 12.3–15.4)
GFR SERPL CREATININE-BSD FRML MDRD: 144 ML/MIN/1.73
GLOBULIN UR ELPH-MCNC: 2.5 GM/DL
GLUCOSE SERPL-MCNC: 71 MG/DL (ref 65–99)
GLUCOSE UR STRIP-MCNC: NEGATIVE MG/DL
HCG INTACT+B SERPL-ACNC: NORMAL MIU/ML
HCT VFR BLD AUTO: 38.8 % (ref 34–46.6)
HGB BLD-MCNC: 13.2 G/DL (ref 12–15.9)
HGB UR QL STRIP.AUTO: NEGATIVE
IMM GRANULOCYTES # BLD AUTO: 0.05 10*3/MM3 (ref 0–0.05)
IMM GRANULOCYTES NFR BLD AUTO: 0.4 % (ref 0–0.5)
KETONES UR QL STRIP: ABNORMAL
LEUKOCYTE ESTERASE UR QL STRIP.AUTO: NEGATIVE
LYMPHOCYTES # BLD AUTO: 2.48 10*3/MM3 (ref 0.7–3.1)
LYMPHOCYTES NFR BLD AUTO: 20.9 % (ref 19.6–45.3)
MCH RBC QN AUTO: 28.9 PG (ref 26.6–33)
MCHC RBC AUTO-ENTMCNC: 34 G/DL (ref 31.5–35.7)
MCV RBC AUTO: 85.1 FL (ref 79–97)
MONOCYTES # BLD AUTO: 0.85 10*3/MM3 (ref 0.1–0.9)
MONOCYTES NFR BLD AUTO: 7.2 % (ref 5–12)
NEUTROPHILS NFR BLD AUTO: 70.9 % (ref 42.7–76)
NEUTROPHILS NFR BLD AUTO: 8.39 10*3/MM3 (ref 1.7–7)
NITRITE UR QL STRIP: NEGATIVE
NRBC BLD AUTO-RTO: 0 /100 WBC (ref 0–0.2)
PH UR STRIP.AUTO: 6 [PH] (ref 5–8)
PLATELET # BLD AUTO: 251 10*3/MM3 (ref 140–450)
PMV BLD AUTO: 11.6 FL (ref 6–12)
POTASSIUM SERPL-SCNC: 3.5 MMOL/L (ref 3.5–5.2)
PROT SERPL-MCNC: 7.2 G/DL (ref 6–8.5)
PROT UR QL STRIP: NEGATIVE
RBC # BLD AUTO: 4.56 10*6/MM3 (ref 3.77–5.28)
SODIUM SERPL-SCNC: 136 MMOL/L (ref 136–145)
SP GR UR STRIP: 1.01 (ref 1–1.03)
UROBILINOGEN UR QL STRIP: ABNORMAL
WBC # BLD AUTO: 11.84 10*3/MM3 (ref 3.4–10.8)

## 2020-07-29 PROCEDURE — 93010 ELECTROCARDIOGRAM REPORT: CPT | Performed by: INTERNAL MEDICINE

## 2020-07-29 PROCEDURE — 96374 THER/PROPH/DIAG INJ IV PUSH: CPT

## 2020-07-29 PROCEDURE — 25010000002 ONDANSETRON PER 1 MG: Performed by: PHYSICIAN ASSISTANT

## 2020-07-29 PROCEDURE — 93005 ELECTROCARDIOGRAM TRACING: CPT | Performed by: PHYSICIAN ASSISTANT

## 2020-07-29 RX ORDER — DOXYLAMINE SUCCINATE AND PYRIDOXINE HYDROCHLORIDE, DELAYED RELEASE TABLETS 10 MG/10 MG 10; 10 MG/1; MG/1
2 TABLET, DELAYED RELEASE ORAL NIGHTLY PRN
Qty: 60 TABLET | Refills: 0 | Status: SHIPPED | OUTPATIENT
Start: 2020-07-29 | End: 2020-08-21

## 2020-07-29 RX ADMIN — ONDANSETRON 4 MG: 2 INJECTION INTRAMUSCULAR; INTRAVENOUS at 00:01

## 2020-07-31 ENCOUNTER — TELEPHONE (OUTPATIENT)
Dept: OBSTETRICS AND GYNECOLOGY | Facility: CLINIC | Age: 20
End: 2020-07-31

## 2020-07-31 RX ORDER — PROMETHAZINE HYDROCHLORIDE 25 MG/1
25 TABLET ORAL EVERY 6 HOURS PRN
Qty: 30 TABLET | Refills: 2 | Status: SHIPPED | OUTPATIENT
Start: 2020-07-31 | End: 2020-08-21

## 2020-07-31 RX ORDER — MULTIVITAMIN WITH IRON
100 TABLET ORAL DAILY
Qty: 30 TABLET | Refills: 2 | Status: SHIPPED | OUTPATIENT
Start: 2020-07-31 | End: 2021-03-10 | Stop reason: HOSPADM

## 2020-07-31 NOTE — TELEPHONE ENCOUNTER
Good Afternoon,     Patient was seen in the ER this past week and was prescribed Diclegis, states her insurance will not cover it and she cannot afford to pay out of pocket for it. She wants to know if you can prescribe something else for her nausea?     Please advise, thank you

## 2020-07-31 NOTE — TELEPHONE ENCOUNTER
Sindhu Escamilla - Multiple Rx sent (Diclegis simple with unisom and b 6) along with phenergan     Thanks   Dr. Rosales

## 2020-08-19 PROCEDURE — 99283 EMERGENCY DEPT VISIT LOW MDM: CPT

## 2020-08-20 ENCOUNTER — HOSPITAL ENCOUNTER (EMERGENCY)
Facility: HOSPITAL | Age: 20
Discharge: HOME | End: 2020-08-20
Attending: EMERGENCY MEDICINE | Admitting: EMERGENCY MEDICINE

## 2020-08-20 ENCOUNTER — APPOINTMENT (OUTPATIENT)
Dept: GENERAL RADIOLOGY | Facility: HOSPITAL | Age: 20
End: 2020-08-20

## 2020-08-20 VITALS
SYSTOLIC BLOOD PRESSURE: 101 MMHG | HEART RATE: 95 BPM | HEIGHT: 62 IN | DIASTOLIC BLOOD PRESSURE: 52 MMHG | RESPIRATION RATE: 16 BRPM | WEIGHT: 105.82 LBS | OXYGEN SATURATION: 97 % | TEMPERATURE: 97.9 F | BODY MASS INDEX: 19.47 KG/M2

## 2020-08-20 DIAGNOSIS — Z34.91 FIRST TRIMESTER PREGNANCY: ICD-10-CM

## 2020-08-20 DIAGNOSIS — R11.0 NAUSEA: Primary | ICD-10-CM

## 2020-08-20 DIAGNOSIS — R19.7 DIARRHEA, UNSPECIFIED TYPE: ICD-10-CM

## 2020-08-20 DIAGNOSIS — R52 GENERALIZED BODY ACHES: ICD-10-CM

## 2020-08-20 LAB
ALBUMIN SERPL-MCNC: 4.4 G/DL (ref 3.5–5.2)
ALBUMIN/GLOB SERPL: 2.1 G/DL
ALP SERPL-CCNC: 55 U/L (ref 39–117)
ALT SERPL W P-5'-P-CCNC: 27 U/L (ref 1–33)
ANION GAP SERPL CALCULATED.3IONS-SCNC: 10.1 MMOL/L (ref 5–15)
AST SERPL-CCNC: 17 U/L (ref 1–32)
B PARAPERT DNA SPEC QL NAA+PROBE: NOT DETECTED
B PERT DNA SPEC QL NAA+PROBE: NOT DETECTED
BASOPHILS # BLD AUTO: 0.02 10*3/MM3 (ref 0–0.2)
BASOPHILS NFR BLD AUTO: 0.2 % (ref 0–1.5)
BILIRUB SERPL-MCNC: <0.2 MG/DL (ref 0–1.2)
BILIRUB UR QL STRIP: NEGATIVE
BUN SERPL-MCNC: 10 MG/DL (ref 6–20)
BUN/CREAT SERPL: 21.7 (ref 7–25)
C PNEUM DNA NPH QL NAA+NON-PROBE: NOT DETECTED
CALCIUM SPEC-SCNC: 9.2 MG/DL (ref 8.6–10.5)
CHLORIDE SERPL-SCNC: 102 MMOL/L (ref 98–107)
CLARITY UR: CLEAR
CO2 SERPL-SCNC: 23.9 MMOL/L (ref 22–29)
COLOR UR: YELLOW
CREAT SERPL-MCNC: 0.46 MG/DL (ref 0.57–1)
DEPRECATED RDW RBC AUTO: 44.5 FL (ref 37–54)
EOSINOPHIL # BLD AUTO: 0.04 10*3/MM3 (ref 0–0.4)
EOSINOPHIL NFR BLD AUTO: 0.4 % (ref 0.3–6.2)
ERYTHROCYTE [DISTWIDTH] IN BLOOD BY AUTOMATED COUNT: 13.1 % (ref 12.3–15.4)
FLUAV H1 2009 PAND RNA NPH QL NAA+PROBE: NOT DETECTED
FLUAV H1 HA GENE NPH QL NAA+PROBE: NOT DETECTED
FLUAV H3 RNA NPH QL NAA+PROBE: NOT DETECTED
FLUAV SUBTYP SPEC NAA+PROBE: NOT DETECTED
FLUBV RNA ISLT QL NAA+PROBE: NOT DETECTED
GFR SERPL CREATININE-BSD FRML MDRD: >150 ML/MIN/1.73
GLOBULIN UR ELPH-MCNC: 2.1 GM/DL
GLUCOSE SERPL-MCNC: 73 MG/DL (ref 65–99)
GLUCOSE UR STRIP-MCNC: NEGATIVE MG/DL
HADV DNA SPEC NAA+PROBE: NOT DETECTED
HCOV 229E RNA SPEC QL NAA+PROBE: NOT DETECTED
HCOV HKU1 RNA SPEC QL NAA+PROBE: NOT DETECTED
HCOV NL63 RNA SPEC QL NAA+PROBE: NOT DETECTED
HCOV OC43 RNA SPEC QL NAA+PROBE: NOT DETECTED
HCT VFR BLD AUTO: 40.3 % (ref 34–46.6)
HGB BLD-MCNC: 13 G/DL (ref 12–15.9)
HGB UR QL STRIP.AUTO: NEGATIVE
HMPV RNA NPH QL NAA+NON-PROBE: NOT DETECTED
HPIV1 RNA SPEC QL NAA+PROBE: NOT DETECTED
HPIV2 RNA SPEC QL NAA+PROBE: NOT DETECTED
HPIV3 RNA NPH QL NAA+PROBE: NOT DETECTED
HPIV4 P GENE NPH QL NAA+PROBE: NOT DETECTED
IMM GRANULOCYTES # BLD AUTO: 0.04 10*3/MM3 (ref 0–0.05)
IMM GRANULOCYTES NFR BLD AUTO: 0.4 % (ref 0–0.5)
KETONES UR QL STRIP: NEGATIVE
LEUKOCYTE ESTERASE UR QL STRIP.AUTO: NEGATIVE
LIPASE SERPL-CCNC: 21 U/L (ref 13–60)
LYMPHOCYTES # BLD AUTO: 1.72 10*3/MM3 (ref 0.7–3.1)
LYMPHOCYTES NFR BLD AUTO: 16.3 % (ref 19.6–45.3)
M PNEUMO IGG SER IA-ACNC: NOT DETECTED
MCH RBC QN AUTO: 29.3 PG (ref 26.6–33)
MCHC RBC AUTO-ENTMCNC: 32.3 G/DL (ref 31.5–35.7)
MCV RBC AUTO: 90.8 FL (ref 79–97)
MONOCYTES # BLD AUTO: 0.64 10*3/MM3 (ref 0.1–0.9)
MONOCYTES NFR BLD AUTO: 6.1 % (ref 5–12)
NEUTROPHILS NFR BLD AUTO: 76.6 % (ref 42.7–76)
NEUTROPHILS NFR BLD AUTO: 8.11 10*3/MM3 (ref 1.7–7)
NITRITE UR QL STRIP: NEGATIVE
NRBC BLD AUTO-RTO: 0 /100 WBC (ref 0–0.2)
PH UR STRIP.AUTO: <=5 [PH] (ref 5–8)
PLATELET # BLD AUTO: 215 10*3/MM3 (ref 140–450)
PMV BLD AUTO: 11.5 FL (ref 6–12)
POTASSIUM SERPL-SCNC: 3.8 MMOL/L (ref 3.5–5.2)
PROT SERPL-MCNC: 6.5 G/DL (ref 6–8.5)
PROT UR QL STRIP: NEGATIVE
RBC # BLD AUTO: 4.44 10*6/MM3 (ref 3.77–5.28)
RHINOVIRUS RNA SPEC NAA+PROBE: NOT DETECTED
RSV RNA NPH QL NAA+NON-PROBE: NOT DETECTED
SARS-COV-2 RNA PNL SPEC NAA+PROBE: NOT DETECTED
SODIUM SERPL-SCNC: 136 MMOL/L (ref 136–145)
SP GR UR STRIP: 1.02 (ref 1–1.03)
UROBILINOGEN UR QL STRIP: NORMAL
WBC # BLD AUTO: 10.57 10*3/MM3 (ref 3.4–10.8)

## 2020-08-20 PROCEDURE — 0202U NFCT DS 22 TRGT SARS-COV-2: CPT | Performed by: EMERGENCY MEDICINE

## 2020-08-20 PROCEDURE — 80053 COMPREHEN METABOLIC PANEL: CPT | Performed by: EMERGENCY MEDICINE

## 2020-08-20 PROCEDURE — 85025 COMPLETE CBC W/AUTO DIFF WBC: CPT | Performed by: EMERGENCY MEDICINE

## 2020-08-20 PROCEDURE — 81003 URINALYSIS AUTO W/O SCOPE: CPT | Performed by: EMERGENCY MEDICINE

## 2020-08-20 PROCEDURE — 71045 X-RAY EXAM CHEST 1 VIEW: CPT

## 2020-08-20 PROCEDURE — 83690 ASSAY OF LIPASE: CPT | Performed by: EMERGENCY MEDICINE

## 2020-08-20 RX ORDER — SODIUM CHLORIDE 0.9 % (FLUSH) 0.9 %
10 SYRINGE (ML) INJECTION AS NEEDED
Status: DISCONTINUED | OUTPATIENT
Start: 2020-08-20 | End: 2020-08-20 | Stop reason: HOSPADM

## 2020-08-20 RX ADMIN — SODIUM CHLORIDE 1000 ML: 9 INJECTION, SOLUTION INTRAVENOUS at 01:02

## 2020-08-20 NOTE — ED NOTES
"PT ambulated to triage window with steady gait. Masked upon arrival.    Reports \"not feeling good\" since this morning. Was sent home from work for \"a low grade fever\". Headache, ABD pain, diarrhea.    Name, , SSN, and address verified with patient. Arm band placed. First look complete.    This nurse wore appropriate PPE during all interactions with this patient.       Kayy Lee RN  [phone]    "

## 2020-08-20 NOTE — ED PROVIDER NOTES
EMERGENCY DEPARTMENT ENCOUNTER    CHIEF COMPLAINT  Chief Complaint: Fever/body aches  History given by: Patient  History limited by: None  Room Number: 13/13  PMD: Usama Andrade MD      HPI:  Pt is a 20 y.o. female who presents complaining of low-grade fever that she states at work with 99.9 as well as generalized body aches.  She also complains of nausea with associated diarrhea with the symptoms.  The patient states that she is approximately 10 weeks pregnant and symptoms began this morning and have been gradually worsening throughout the day today.  The patient states that symptoms are mild in intensity.  She did not take any medications or treat this prior to arrival.  Nothing thus far has alleviated nor exacerbated her symptoms.  This is the patient's first pregnancy and she has had some nausea and vomiting with this pregnancy but never associated body aches or diarrhea thus far.  The patient denies chest pain, back pain, extremity pain, neck pain, or known sick contacts.  The patient also complains of a very mild generalized headache that she describes as more of a dull sensation that is nonradiating.        PAST MEDICAL HISTORY  Active Ambulatory Problems     Diagnosis Date Noted   • No Active Ambulatory Problems     Resolved Ambulatory Problems     Diagnosis Date Noted   • No Resolved Ambulatory Problems     Past Medical History:   Diagnosis Date   • Anemia    • Hypertension    • Migraines    • UTI (urinary tract infection)        PAST SURGICAL HISTORY  History reviewed. No pertinent surgical history.    FAMILY HISTORY  Family History   Problem Relation Age of Onset   • Breast cancer Neg Hx    • Ovarian cancer Neg Hx    • Uterine cancer Neg Hx    • Colon cancer Neg Hx    • Deep vein thrombosis Neg Hx    • Pulmonary embolism Neg Hx        SOCIAL HISTORY  Social History     Socioeconomic History   • Marital status: Single     Spouse name: Not on file   • Number of children: Not on file   • Years of  education: Not on file   • Highest education level: Not on file   Tobacco Use   • Smoking status: Current Every Day Smoker     Packs/day: 0.10     Years: 1.00     Pack years: 0.10     Types: Cigarettes   • Smokeless tobacco: Never Used   Substance and Sexual Activity   • Alcohol use: Not Currently   • Drug use: No   • Sexual activity: Yes     Partners: Male       ALLERGIES  Patient has no known allergies.    REVIEW OF SYSTEMS  Review of Systems   Constitutional: Positive for chills and fever.   HENT: Negative for sore throat.    Eyes: Negative.    Respiratory: Positive for shortness of breath. Negative for cough.    Cardiovascular: Negative for chest pain.   Gastrointestinal: Positive for abdominal pain, diarrhea, nausea and vomiting.   Genitourinary: Negative for dysuria.   Musculoskeletal: Positive for myalgias. Negative for neck pain.   Skin: Negative for rash.   Allergic/Immunologic: Negative.    Neurological: Negative for weakness, numbness and headaches.   Hematological: Negative.    Psychiatric/Behavioral: Negative.    All other systems reviewed and are negative.      PHYSICAL EXAM  ED Triage Vitals   Temp Heart Rate Resp BP SpO2   08/19/20 2225 08/19/20 2225 08/20/20 0028 08/19/20 2225 08/19/20 2225   97.9 °F (36.6 °C) 95 16 139/90 99 %      Temp src Heart Rate Source Patient Position BP Location FiO2 (%)   08/19/20 2225 -- -- -- --   Tympanic           Physical Exam   Constitutional: She is oriented to person, place, and time. No distress.   HENT:   Head: Normocephalic and atraumatic.   Eyes: Pupils are equal, round, and reactive to light. EOM are normal.   Neck: Normal range of motion. Neck supple.   Cardiovascular: Normal rate, regular rhythm and normal heart sounds.   Pulmonary/Chest: Effort normal and breath sounds normal. No respiratory distress.   Abdominal: Soft. There is no tenderness. There is no rebound and no guarding.   Musculoskeletal: Normal range of motion. She exhibits no edema.    Neurological: She is alert and oriented to person, place, and time. She has normal sensation and normal strength.   Skin: Skin is warm and dry. No rash noted.   Psychiatric: Mood and affect normal.   Nursing note and vitals reviewed.      LAB RESULTS  Lab Results (last 24 hours)     Procedure Component Value Units Date/Time    CBC & Differential [[id number]] Collected:  08/20/20 0036    Specimen:  Blood Updated:  08/20/20 0044    Narrative:       The following orders were created for panel order CBC & Differential.  Procedure                               Abnormality         Status                     ---------                               -----------         ------                     CBC Auto Differential[[id number]]        Abnormal            Final result                 Please view results for these tests on the individual orders.    Comprehensive Metabolic Panel [[id number]]  (Abnormal) Collected:  08/20/20 0036    Specimen:  Blood Updated:  08/20/20 0102     Glucose 73 mg/dL      BUN 10 mg/dL      Creatinine 0.46 mg/dL      Sodium 136 mmol/L      Potassium 3.8 mmol/L      Chloride 102 mmol/L      CO2 23.9 mmol/L      Calcium 9.2 mg/dL      Total Protein 6.5 g/dL      Albumin 4.40 g/dL      ALT (SGPT) 27 U/L      AST (SGOT) 17 U/L      Alkaline Phosphatase 55 U/L      Total Bilirubin <0.2 mg/dL      eGFR Non African Amer >150 mL/min/1.73      Globulin 2.1 gm/dL      A/G Ratio 2.1 g/dL      BUN/Creatinine Ratio 21.7     Anion Gap 10.1 mmol/L     Narrative:       GFR Normal >60  Chronic Kidney Disease <60  Kidney Failure <15      Lipase [[id number]]  (Normal) Collected:  08/20/20 0036    Specimen:  Blood Updated:  08/20/20 0102     Lipase 21 U/L     Urinalysis With Microscopic If Indicated (No Culture) - Urine, Clean Catch [[id number]]  (Normal) Collected:  08/20/20 0036    Specimen:  Urine, Clean Catch Updated:  08/20/20 0047     Color, UA Yellow     Appearance, UA Clear     pH, UA <=5.0     Specific Gravity, UA  1.020     Glucose, UA Negative     Ketones, UA Negative     Bilirubin, UA Negative     Blood, UA Negative     Protein, UA Negative     Leuk Esterase, UA Negative     Nitrite, UA Negative     Urobilinogen, UA 0.2 E.U./dL    Narrative:       Urine microscopic not indicated.    CBC Auto Differential [[id number]]  (Abnormal) Collected:  08/20/20 0036    Specimen:  Blood Updated:  08/20/20 0044     WBC 10.57 10*3/mm3      RBC 4.44 10*6/mm3      Hemoglobin 13.0 g/dL      Hematocrit 40.3 %      MCV 90.8 fL      MCH 29.3 pg      MCHC 32.3 g/dL      RDW 13.1 %      RDW-SD 44.5 fl      MPV 11.5 fL      Platelets 215 10*3/mm3      Neutrophil % 76.6 %      Lymphocyte % 16.3 %      Monocyte % 6.1 %      Eosinophil % 0.4 %      Basophil % 0.2 %      Immature Grans % 0.4 %      Neutrophils, Absolute 8.11 10*3/mm3      Lymphocytes, Absolute 1.72 10*3/mm3      Monocytes, Absolute 0.64 10*3/mm3      Eosinophils, Absolute 0.04 10*3/mm3      Basophils, Absolute 0.02 10*3/mm3      Immature Grans, Absolute 0.04 10*3/mm3      nRBC 0.0 /100 WBC     Respiratory Panel PCR w/COVID-19(SARS-CoV-2) [facility]/Y[facility] In-House, NP Swab in UTM/VTM, 3-4 HR TAT - Swab, Nasopharynx [[id number]]  (Normal) Collected:  08/20/20 0101    Specimen:  Swab from Nasopharynx Updated:  08/20/20 0249     ADENOVIRUS, PCR Not Detected     Coronavirus 229E Not Detected     Coronavirus HKU1 Not Detected     Coronavirus NL63 Not Detected     Coronavirus OC43 Not Detected     COVID19 Not Detected     Human Metapneumovirus Not Detected     Human Rhinovirus/Enterovirus Not Detected     Influenza A PCR Not Detected     Influenza A H1 Not Detected     Influenza A H1 2009 PCR Not Detected     Influenza A H3 Not Detected     Influenza B PCR Not Detected     Parainfluenza Virus 1 Not Detected     Parainfluenza Virus 2 Not Detected     Parainfluenza Virus 3 Not Detected     Parainfluenza Virus 4 Not Detected     RSV, PCR Not Detected     Bordetella pertussis pcr Not Detected      Bordetella parapertussis PCR Not Detected     Chlamydophila pneumoniae PCR Not Detected     Mycoplasma pneumo by PCR Not Detected    Narrative:       Fact sheet for providers: https://docs.[employer]/wp-content/uploads/FRM8307-1332-WU8.1-EUA-Provider-Fact-Sheet-3.pdf    Fact sheet for patients: https://docs.[employer]/wp-content/uploads/SDD3367-9166-LE3.1-EUA-Patient-Fact-Sheet-1.pdf          I ordered the above labs and reviewed the results    RADIOLOGY  XR Chest 1 View   Final Result   1. No acute process.       This report was finalized on 8/20/2020 1:06 AM by Dr. Isaias Peter M.D.               I ordered the above noted radiological studies. Interpreted by radiologist.  Reviewed by me in PACS.       PROCEDURES  Procedures      PROGRESS AND CONSULTS     The patient was wearing a facemask upon entrance into the room and remained in such throughout their visit.  I was wearing PPE including a facemask, eye protection, as well as gloves at any point entering the room and throughout the visit    0300  Upon reevaluation, the patient is currently asleep with stable vital signs.  Upon awakening the patient I did inform her that all of her tests including COVID-19 testing have come back negative and that this is likely a viral GI syndrome that should resolve in the next couple of days.  I did inform her that she should rest and drink plenty of fluids until symptoms resolve.  All questions have been answered and she will be stable for discharge      MEDICAL DECISION MAKING  Results were reviewed/discussed with the patient and they were also made aware of online access. Pt also made aware that some labs, such as cultures, will not be resulted during ER visit and follow up with PMD is necessary.     MDM  Number of Diagnoses or Management Options     Amount and/or Complexity of Data Reviewed  Clinical lab tests: ordered and reviewed  Tests in the radiology section of CPT®: ordered and reviewed  Tests in the  medicine section of CPT®: ordered and reviewed  Review and summarize past medical records: yes (Upon medical records review, the patient was last seen and evaluated on 7/28/2020 for vomiting during early pregnancy.)           DIAGNOSIS  Final diagnoses:   Nausea   Diarrhea, unspecified type   Generalized body aches   First trimester pregnancy       DISPOSITION  DISCHARGE    Patient discharged in stable condition.    Reviewed implications of results, diagnosis, meds, responsibility to follow up, warning signs and symptoms of possible worsening, potential complications and reasons to return to ER    Patient/Family voiced understanding of above instructions.    Discussed plan for discharge, as there is no emergent indication for admission. Patient referred to primary care provider for BP management due to today's BP. Pt/family is agreeable and understands need for follow up and repeat testing.  Pt is aware that discharge does not mean that nothing is wrong but it indicates no emergency is present that requires admission and they must continue care with follow-up as given below or physician of their choice.     FOLLOW-UP  Usama Andrade MD  [address] [id number] [phone]    Schedule an appointment as soon as possible for a visit            Medication List      No changes were made to your prescriptions during this visit.           Latest Documented Vital Signs:  As of 06:32  BP- 101/52 HR- 95 Temp- 97.9 °F (36.6 °C) (Tympanic) O2 sat- 97%         Justin Cabrera MD  08/20/20 0632

## 2020-08-20 NOTE — ED NOTES
Pt provided discharge and follow up instructions at this time.  Medications reviewed.  Questions answered.  Pt verbalized understanding of all discharge information. Pt vitals stable, no obvious distress noted.Pt care performed wearing appropriate PPE     Nhung Cohen RN  08/20/20 [id number]

## 2020-08-21 ENCOUNTER — TELEMEDICINE (OUTPATIENT)
Dept: FAMILY MEDICINE CLINIC | Facility: TELEHEALTH | Age: 20
End: 2020-08-21

## 2020-08-21 ENCOUNTER — ROUTINE PRENATAL (OUTPATIENT)
Dept: OBSTETRICS AND GYNECOLOGY | Facility: CLINIC | Age: 20
End: 2020-08-21

## 2020-08-21 VITALS — WEIGHT: 105 LBS | SYSTOLIC BLOOD PRESSURE: 115 MMHG | DIASTOLIC BLOOD PRESSURE: 82 MMHG | BODY MASS INDEX: 19.2 KG/M2

## 2020-08-21 VITALS — TEMPERATURE: 98.8 F

## 2020-08-21 DIAGNOSIS — R19.7 DIARRHEA, UNSPECIFIED TYPE: Primary | ICD-10-CM

## 2020-08-21 DIAGNOSIS — Z34.01 ENCOUNTER FOR SUPERVISION OF NORMAL FIRST PREGNANCY IN FIRST TRIMESTER: Primary | ICD-10-CM

## 2020-08-21 DIAGNOSIS — Z13.79 ENCOUNTER FOR GENETIC SCREENING FOR DOWN SYNDROME: ICD-10-CM

## 2020-08-21 DIAGNOSIS — O99.331 TOBACCO USE IN PREGNANCY, ANTEPARTUM, FIRST TRIMESTER: ICD-10-CM

## 2020-08-21 PROCEDURE — 99213 OFFICE O/P EST LOW 20 MIN: CPT | Performed by: OBSTETRICS & GYNECOLOGY

## 2020-08-21 PROCEDURE — 99213 OFFICE O/P EST LOW 20 MIN: CPT | Performed by: NURSE PRACTITIONER

## 2020-08-21 NOTE — PATIENT INSTRUCTIONS
Dehydration, Adult    Dehydration is when there is not enough fluid or water in your body. This happens when you lose more fluids than you take in. Dehydration can range from mild to very bad. It should be treated right away to keep it from getting very bad.  Symptoms of mild dehydration may include:  · Thirst.  · Dry lips.  · Slightly dry mouth.  · Dry, warm skin.  · Dizziness.  Symptoms of moderate dehydration may include:  · Very dry mouth.  · Muscle cramps.  · Dark pee (urine). Pee may be the color of tea.  · Your body making less pee.  · Your eyes making fewer tears.  · Heartbeat that is uneven or faster than normal (palpitations).  · Headache.  · Light-headedness, especially when you stand up from sitting.  · Fainting (syncope).  Symptoms of very bad dehydration may include:  · Changes in skin, such as:  ? Cold and clammy skin.  ? Blotchy (mottled) or pale skin.  ? Skin that does not quickly return to normal after being lightly pinched and let go (poor skin turgor).  · Changes in body fluids, such as:  ? Feeling very thirsty.  ? Your eyes making fewer tears.  ? Not sweating when body temperature is high, such as in hot weather.  ? Your body making very little pee.  · Changes in vital signs, such as:  ? Weak pulse.  ? Pulse that is more than 100 beats a minute when you are sitting still.  ? Fast breathing.  ? Low blood pressure.  · Other changes, such as:  ? Sunken eyes.  ? Cold hands and feet.  ? Confusion.  ? Lack of energy (lethargy).  ? Trouble waking up from sleep.  ? Short-term weight loss.  ? Unconsciousness.  Follow these instructions at home:    · If told by your doctor, drink an ORS:  ? Make an ORS by using instructions on the package.  ? Start by drinking small amounts, about ½ cup (120 mL) every 5-10 minutes.  ? Slowly drink more until you have had the amount that your doctor said to have.  · Drink enough clear fluid to keep your pee clear or pale yellow. If you were told to drink an ORS, finish the  ORS first, then start slowly drinking clear fluids. Drink fluids such as:  ? Water. Do not drink only water by itself. Doing that can make the salt (sodium) level in your body get too low (hyponatremia).  ? Ice chips.  ? Fruit juice that you have added water to (diluted).  ? Low-calorie sports drinks.  · Avoid:  ? Alcohol.  ? Drinks that have a lot of sugar. These include high-calorie sports drinks, fruit juice that does not have water added, and soda.  ? Caffeine.  ? Foods that are greasy or have a lot of fat or sugar.  · Take over-the-counter and prescription medicines only as told by your doctor.  · Do not take salt tablets. Doing that can make the salt level in your body get too high (hypernatremia).  · Eat foods that have minerals (electrolytes). Examples include bananas, oranges, potatoes, tomatoes, and spinach.  · Keep all follow-up visits as told by your doctor. This is important.  Contact a doctor if:  · You have belly (abdominal) pain that:  ? Gets worse.  ? Stays in one area (localizes).  · You have a rash.  · You have a stiff neck.  · You get angry or annoyed more easily than normal (irritability).  · You are more sleepy than normal.  · You have a harder time waking up than normal.  · You feel:  ? Weak.  ? Dizzy.  ? Very thirsty.  · You have peed (urinated) only a small amount of very dark pee during 6-8 hours.  Get help right away if:  · You have symptoms of very bad dehydration.  · You cannot drink fluids without throwing up (vomiting).  · Your symptoms get worse with treatment.  · You have a fever.  · You have a very bad headache.  · You are throwing up or having watery poop (diarrhea) and it:  ? Gets worse.  ? Does not go away.  · You have blood or something green (bile) in your throw-up.  · You have blood in your poop (stool). This may cause poop to look black and tarry.  · You have not peed in 6-8 hours.  · You pass out (faint).  · Your heart rate when you are sitting still is more than 100 beats a  minute.  · You have trouble breathing.  This information is not intended to replace advice given to you by your health care provider. Make sure you discuss any questions you have with your health care provider.  Document Released: 10/14/2010 Document Revised: 11/30/2018 Document Reviewed: 02/10/2017  [employer]vier Patient Education © 2020 [employer]

## 2020-08-21 NOTE — PROGRESS NOTES
Anaya Conrad  2000    Chief Complaint   Patient presents with   • Diarrhea       HPI  Anaya Conrad is a 20 y.o. female with complaints of diarrhea. She states that she went to the hospital on Wednesday with diarrhea and was given IV fluids, tested negative for Covid and sent home with work note until today. She is still having diarrhea, feeling weak and wants a note for today. She is 10 weeks pregnant. She states that she was at the OB office today but did not ask for a note. She is keeping fluids down but is still going to the BR with diarrhea every time that she trys to eat or drink. She denies abdominal cramping, fever or chills.       The following portions of the patient's history were reviewed and updated as appropriate: allergies, current medications, past social history and problem list.    Past Medical History:   Diagnosis Date   • Anemia    • Hypertension    • Migraines    • UTI (urinary tract infection)      Social History     Socioeconomic History   • Marital status: Single     Spouse name: Not on file   • Number of children: Not on file   • Years of education: Not on file   • Highest education level: Not on file   Tobacco Use   • Smoking status: Current Every Day Smoker     Packs/day: 0.10     Years: 1.00     Pack years: 0.10     Types: Cigarettes   • Smokeless tobacco: Never Used   Substance and Sexual Activity   • Alcohol use: Not Currently   • Drug use: No   • Sexual activity: Yes     Partners: Male       REVIEW OF SYSTEMS  History obtained from chart review and the patient  General ROS: positive for  - fatigue  negative for - chills or fever  Respiratory ROS: positive for - cough  negative for - shortness of breath or wheezing  Gastrointestinal ROS: positive for - change in bowel habits and diarrhea  negative for - abdominal pain, blood in stools or nausea/vomiting    PHYSICAL EXAM  Temp 98.8 °F (37.1 °C) (Oral)   LMP 06/09/2020     CONSTITUTIONAL:alert, well appearing, and in no  distress  HEAD:Atraumatic, normocephalic  Mouth: Lips normal.  CHEST:respiratory effort normal  PSYCH:alert, normal affect and speech     Anaya was seen today for diarrhea.    Diagnoses and all orders for this visit:    Diarrhea, unspecified type    Work note for today. Return to work on Monday. F/U with PCP and/or OB next week if no improvement. Go to ER or UC for fluids if dehydration develops. Pt educated on s/s to watch for.    [id number] - [place], [place] - [address] AT [facility] [address] - [phone]  - [phone] FX  [address]  [facility] [address]  Phone: [phone] Fax: [phone]    Counseling:  diagnosis, evaluation, treatment options and management    small amounts clear fluids frequently  Gatorade  soups  juices  water  'BRAT' diet  advance diet as tolerated  Go to ER or UC for fluids if unable to keep PO fluids down or for signs of dehydration. Discussed s/s with patient    This visit was performed via Telehealth. This patient has been instructed to follow up with their primary care provider or Urgent Care if their symptoms worsen or there is no improvement with the treatment provided for the illness. The patient has been instructed to go to the nearest Emergency Department for signs of dehydration, increasing diarrhea, high fever, chest pain, shortness of breath or any other life-threatening symptoms.       Shaila Franco, APRN  08/21/20  3:34 PM

## 2020-08-21 NOTE — PROGRESS NOTES
OB follow up     Anaya Conrad is a 20 y.o.  10w3d being seen today for her obstetrical visit.  Patient reports cramping. . Fetal movement: absent..    Her prenatal care is complicated by (and status): Tobacco use    Noted fever a few days ago.   Sent home from work   URI testing was negative (including COVID -19)   Continues to have cough, no further fevers     Review of Systems  Cramping/contractions : none  Vaginal bleeding: none   Fetal movement too early     /82   Wt 47.6 kg (105 lb)   LMP 2020   BMI 19.20 kg/m²     FHT: 156 BPM   Uterine Size: 10 cm       Assessment    Problems Addressed this Visit     None      Visit Diagnoses     Encounter for supervision of normal first pregnancy in first trimester    -  Primary    Tobacco use in pregnancy, antepartum, first trimester        Encounter for genetic screening for Down Syndrome        Relevant Orders    [id number] PLUS Core+SCA - Blood,          1) pregnancy at 10w3d   Labs, culture and ultrasound reviewed.   2) Desires down syndrome testing   3) URI   Hydration and rest encouraged  Okay for work note for few more days off   4) Tobacco use  Cessation encouraged.       Plan    Reviewed this stage of pregnancy  Problem list updated   Follow up in 4 weeks    Gary Rosales MD   2020  12:11

## 2020-08-27 LAB
CFDNA.FET/CFDNA.TOTAL SFR FETUS: NORMAL %
CITATION REF LAB TEST: NORMAL
FET 13+18+21+X+Y ANEUP PLAS.CFDNA: NEGATIVE
FET CHR 21 TS PLAS.CFDNA QL: NEGATIVE
FET MS X RISK WBC.DNA+CFDNA QL: NOT DETECTED
FET SEX PLAS.CFDNA DOSAGE CFDNA: NORMAL
FET TS 13 RISK PLAS.CFDNA QL: NEGATIVE
FET TS 18 RISK WBC.DNA+CFDNA QL: NEGATIVE
FET X + Y ANEUP RISK PLAS.CFDNA SEQ-IMP: NOT DETECTED
GA EST FROM CONCEPTION DATE: NORMAL D
GESTATIONAL AGE > 9:: YES
LAB DIRECTOR NAME PROVIDER: NORMAL
LAB DIRECTOR NAME PROVIDER: NORMAL
LABORATORY COMMENT REPORT: NORMAL
LIMITATIONS OF THE TEST: NORMAL
NEGATIVE PREDICTIVE VALUE: NORMAL
NOTE: NORMAL
PERFORMANCE CHARACTERISTICS: NORMAL
POSITIVE PREDICTIVE VALUE: NORMAL
REF LAB TEST METHOD: NORMAL
TEST PERFORMANCE INFO SPEC: NORMAL

## 2020-08-31 ENCOUNTER — TELEPHONE (OUTPATIENT)
Dept: OBSTETRICS AND GYNECOLOGY | Facility: CLINIC | Age: 20
End: 2020-08-31

## 2020-08-31 NOTE — TELEPHONE ENCOUNTER
Pt returning call, informed of normal genetic results.  Ok to inform pt's friend, Anum Katz, of gender results (baby is female).

## 2020-09-01 ENCOUNTER — TELEPHONE (OUTPATIENT)
Dept: OBSTETRICS AND GYNECOLOGY | Facility: CLINIC | Age: 20
End: 2020-09-01

## 2020-09-01 NOTE — TELEPHONE ENCOUNTER
----- Message from Oly Estrada MA sent at 8/31/2020 12:16 PM EDT -----  L/m for pt/mabel  ----- Message -----  From: Gary Rosales MD  Sent: 8/31/2020   8:47 AM EDT  To: AMOS Sewell, let her know the down syndrome  Test was negative/Normal. Appears female. Thanks Dr. Rosales

## 2020-09-03 NOTE — TELEPHONE ENCOUNTER
Patient called back and wanted to know the gender of the baby. Pt is aware of lab results and gender.

## 2020-09-18 ENCOUNTER — ROUTINE PRENATAL (OUTPATIENT)
Dept: OBSTETRICS AND GYNECOLOGY | Facility: CLINIC | Age: 20
End: 2020-09-18

## 2020-09-18 VITALS — SYSTOLIC BLOOD PRESSURE: 104 MMHG | BODY MASS INDEX: 19.2 KG/M2 | WEIGHT: 105 LBS | DIASTOLIC BLOOD PRESSURE: 69 MMHG

## 2020-09-18 DIAGNOSIS — Z34.02 ENCOUNTER FOR SUPERVISION OF NORMAL FIRST PREGNANCY IN SECOND TRIMESTER: Primary | ICD-10-CM

## 2020-09-18 DIAGNOSIS — O26.892 HEADACHE IN PREGNANCY, ANTEPARTUM, SECOND TRIMESTER: ICD-10-CM

## 2020-09-18 DIAGNOSIS — Z36.9 ANTENATAL SCREENING ENCOUNTER: ICD-10-CM

## 2020-09-18 DIAGNOSIS — R51.9 HEADACHE IN PREGNANCY, ANTEPARTUM, SECOND TRIMESTER: ICD-10-CM

## 2020-09-18 LAB
GLUCOSE UR STRIP-MCNC: NEGATIVE MG/DL
PROT UR STRIP-MCNC: NEGATIVE MG/DL

## 2020-09-18 PROCEDURE — 99213 OFFICE O/P EST LOW 20 MIN: CPT | Performed by: OBSTETRICS & GYNECOLOGY

## 2020-09-18 RX ORDER — DIPHENHYDRAMINE HCL 50 MG/1
CAPSULE ORAL
COMMUNITY
Start: 2020-07-31 | End: 2021-03-10 | Stop reason: HOSPADM

## 2020-09-18 NOTE — PROGRESS NOTES
OB follow up     Anaya Conrad is a 20 y.o.  14w3d being seen today for her obstetrical visit.  Patient reports headache. Fetal movement: absent.    Her prenatal care is complicated by (and status): uncomplicated     Review of Systems  Cramping/contractions : none   Vaginal bleeding: none  Fetal movement too early     /69   Wt 47.6 kg (105 lb)   LMP 2020   BMI 19.20 kg/m²     FHT: 156 BPM   Uterine Size: size equals dates       Assessment    Problems Addressed this Visit     None      Visit Diagnoses     Encounter for supervision of normal first pregnancy in second trimester    -  Primary    Headache in pregnancy, antepartum, second trimester         screening encounter        Relevant Orders    US Ob 14 + Weeks Single or First Gestation          1) pregnancy at 14w3d   Quickening reviewed  Anatomy scan next visit (19-20 weeks)   Down syndrome testing normal   Flu shot recommended   2) Headaches in pregnancy   Frontal - sinus vs stress   Tylenol recommended        Plan    Reviewed this stage of pregnancy  Problem list updated   Follow up in 5 weeks    Gary Rosales MD   2020  12:23 EDT

## 2020-09-22 ENCOUNTER — TELEPHONE (OUTPATIENT)
Dept: OBSTETRICS AND GYNECOLOGY | Facility: CLINIC | Age: 20
End: 2020-09-22

## 2020-09-22 NOTE — TELEPHONE ENCOUNTER
Good afternoon,    Patient called stating she is about 15 weeks pregnant and was wanting to know if it was ok for her to use Flonase nasal spray?     Please advise,  Thank you

## 2020-09-22 NOTE — TELEPHONE ENCOUNTER
Oly,     Please let her know that is fine to use in pregnancy. No concerns.     Thanks   Dr. Rosales

## 2020-10-20 ENCOUNTER — ROUTINE PRENATAL (OUTPATIENT)
Dept: OBSTETRICS AND GYNECOLOGY | Facility: CLINIC | Age: 20
End: 2020-10-20

## 2020-10-20 VITALS — WEIGHT: 111 LBS | BODY MASS INDEX: 20.3 KG/M2 | SYSTOLIC BLOOD PRESSURE: 106 MMHG | DIASTOLIC BLOOD PRESSURE: 69 MMHG

## 2020-10-20 DIAGNOSIS — Z34.02 ENCOUNTER FOR SUPERVISION OF NORMAL FIRST PREGNANCY IN SECOND TRIMESTER: Primary | ICD-10-CM

## 2020-10-20 DIAGNOSIS — O26.12 INSUFFICIENT WEIGHT GAIN DURING PREGNANCY IN SECOND TRIMESTER: ICD-10-CM

## 2020-10-20 LAB
GLUCOSE UR STRIP-MCNC: NEGATIVE MG/DL
PROT UR STRIP-MCNC: NEGATIVE MG/DL

## 2020-10-20 PROCEDURE — 99213 OFFICE O/P EST LOW 20 MIN: CPT | Performed by: OBSTETRICS & GYNECOLOGY

## 2020-10-20 NOTE — PROGRESS NOTES
OB follow up     Anaya Conrad is a 20 y.o.  19w0d being seen today for her obstetrical visit.  Patient reports no complaints. Fetal movement: normal.    Her prenatal care is complicated by (and status): uncomplicated     Review of Systems  Cramping/contractions : none   Vaginal bleeding: none   Fetal movement good     /69   Wt 50.3 kg (111 lb)   LMP 2020   BMI 20.30 kg/m²     FHT: 144 BPM   Uterine Size: 19 cm       Assessment    Problems Addressed this Visit     None      Visit Diagnoses     Encounter for supervision of normal first pregnancy in second trimester    -  Primary    Insufficient weight gain during pregnancy in second trimester          Diagnoses       Codes Comments    Encounter for supervision of normal first pregnancy in second trimester    -  Primary ICD-10-CM: Z34.02  ICD-9-CM: V22.0     Insufficient weight gain during pregnancy in second trimester     ICD-10-CM: O26.12  ICD-9-CM: 646.83           1) pregnancy at 19w0d   Anatomy scan reviewed, essentially normal   2) Unsure about Flu vaccine   3) Insufficient weight gain   Discussed overall goals with being underweight at beginning of pregnancy   Good interval gain of 6#, goal 1# per week moving forward.   4) nausea in pregnancy   On diphenhydramine       Plan    Reviewed this stage of pregnancy  Problem list updated   Follow up in 4 weeks    Gary Rosales MD   10/20/2020  09:18 EDT

## 2020-11-17 ENCOUNTER — ROUTINE PRENATAL (OUTPATIENT)
Dept: OBSTETRICS AND GYNECOLOGY | Facility: CLINIC | Age: 20
End: 2020-11-17

## 2020-11-17 VITALS — DIASTOLIC BLOOD PRESSURE: 65 MMHG | SYSTOLIC BLOOD PRESSURE: 102 MMHG | WEIGHT: 113 LBS | BODY MASS INDEX: 20.67 KG/M2

## 2020-11-17 DIAGNOSIS — O26.12 INSUFFICIENT WEIGHT GAIN DURING PREGNANCY IN SECOND TRIMESTER: ICD-10-CM

## 2020-11-17 DIAGNOSIS — O99.350 PREGNANCY RELATED CARPAL TUNNEL SYNDROME, ANTEPARTUM: ICD-10-CM

## 2020-11-17 DIAGNOSIS — G56.00 PREGNANCY RELATED CARPAL TUNNEL SYNDROME, ANTEPARTUM: ICD-10-CM

## 2020-11-17 DIAGNOSIS — Z36.9 ANTENATAL SCREENING ENCOUNTER: ICD-10-CM

## 2020-11-17 DIAGNOSIS — Z34.02 ENCOUNTER FOR SUPERVISION OF NORMAL FIRST PREGNANCY IN SECOND TRIMESTER: Primary | ICD-10-CM

## 2020-11-17 LAB
GLUCOSE UR STRIP-MCNC: NEGATIVE MG/DL
PROT UR STRIP-MCNC: NEGATIVE MG/DL

## 2020-11-17 PROCEDURE — 99213 OFFICE O/P EST LOW 20 MIN: CPT | Performed by: OBSTETRICS & GYNECOLOGY

## 2020-11-17 NOTE — PROGRESS NOTES
OB follow up     Anaya Conrad is a 20 y.o.  23w0d being seen today for her obstetrical visit.  Patient reports numbness in hands. . Fetal movement: normal.    Her prenatal care is complicated by (and status): uncomplicated     Review of Systems  Cramping/contractions : none   Vaginal bleeding: none   Fetal movement good     /65   Wt 51.3 kg (113 lb)   LMP 2020   BMI 20.67 kg/m²     FHT: 154 BPM   Uterine Size: 22 cm       Assessment    Diagnoses and all orders for this visit:    1. Encounter for supervision of normal first pregnancy in second trimester (Primary)    2. Insufficient weight gain during pregnancy in second trimester    3.  screening encounter  -     CBC & Differential; Future  -     Gestational Screen 1 Hr ([facility]); Future    4. Pregnancy related carpal tunnel syndrome, antepartum        1) pregnancy at 23w0d   Rh+, GTT/CBC ordered  2) Insufficient weight gain, good interval gain   Continue to encourage 1# per week   3) Carpel tunnel in pregnancy   Recommended cock up wrist splints.   3) Declines flu vaccine.        Plan    Reviewed this stage of pregnancy  Problem list updated   Follow up in 4 weeks    Gary Rosales MD   2020  09:53 EST

## 2020-11-25 ENCOUNTER — HOSPITAL ENCOUNTER (EMERGENCY)
Facility: HOSPITAL | Age: 20
Discharge: HOME | End: 2020-11-26
Attending: EMERGENCY MEDICINE | Admitting: EMERGENCY MEDICINE

## 2020-11-25 DIAGNOSIS — Z20.822 COUGH WITH EXPOSURE TO COVID-19 VIRUS: Primary | ICD-10-CM

## 2020-11-25 DIAGNOSIS — R05.8 COUGH WITH EXPOSURE TO COVID-19 VIRUS: Primary | ICD-10-CM

## 2020-11-25 PROCEDURE — 99283 EMERGENCY DEPT VISIT LOW MDM: CPT

## 2020-11-26 VITALS
DIASTOLIC BLOOD PRESSURE: 64 MMHG | TEMPERATURE: 97.4 F | RESPIRATION RATE: 16 BRPM | HEART RATE: 81 BPM | OXYGEN SATURATION: 98 % | SYSTOLIC BLOOD PRESSURE: 113 MMHG | HEIGHT: 62 IN | BODY MASS INDEX: 20.67 KG/M2

## 2020-11-26 PROCEDURE — U0004 COV-19 TEST NON-CDC HGH THRU: HCPCS | Performed by: EMERGENCY MEDICINE

## 2020-11-27 LAB — SARS-COV-2 RNA RESP QL NAA+PROBE: NOT DETECTED

## 2020-11-30 ENCOUNTER — DOCUMENTATION (OUTPATIENT)
Dept: OBSTETRICS AND GYNECOLOGY | Facility: CLINIC | Age: 20
End: 2020-11-30

## 2020-11-30 NOTE — PROGRESS NOTES
Telephone Note    Patient called the on call line for abdominal cramping. She is 24w6d. She reports that for the last 2 days, she has experienced abdominal cramping and pelvic pain with standing that lasts about 5-10 minutes. She reports it improves with sitting and laying down. She denies vaginal bleeding or leakage of fluid. She is feeling her baby move.     We discussed recommendations for hydration and advised patient to get a belly band as I suspect her pain is likely round ligament pain. Also recommended Tylenol for pain control. We discussed call precautions for  labor. Patient indicated understanding and all questions answered.     Kellie Bentley MD

## 2020-12-07 ENCOUNTER — RESULTS ENCOUNTER (OUTPATIENT)
Dept: OBSTETRICS AND GYNECOLOGY | Facility: CLINIC | Age: 20
End: 2020-12-07

## 2020-12-07 ENCOUNTER — HOSPITAL ENCOUNTER (EMERGENCY)
Facility: HOSPITAL | Age: 20
Discharge: HOME | End: 2020-12-07
Attending: OBSTETRICS & GYNECOLOGY | Admitting: OBSTETRICS & GYNECOLOGY

## 2020-12-07 ENCOUNTER — TELEPHONE (OUTPATIENT)
Dept: OBSTETRICS AND GYNECOLOGY | Facility: CLINIC | Age: 20
End: 2020-12-07

## 2020-12-07 ENCOUNTER — HOSPITAL ENCOUNTER (OUTPATIENT)
Facility: HOSPITAL | Age: 20
End: 2020-12-07
Attending: OBSTETRICS & GYNECOLOGY | Admitting: OBSTETRICS & GYNECOLOGY

## 2020-12-07 VITALS
DIASTOLIC BLOOD PRESSURE: 63 MMHG | TEMPERATURE: 98.5 F | HEART RATE: 64 BPM | RESPIRATION RATE: 18 BRPM | HEIGHT: 62 IN | WEIGHT: 115 LBS | OXYGEN SATURATION: 98 % | BODY MASS INDEX: 21.16 KG/M2 | SYSTOLIC BLOOD PRESSURE: 129 MMHG

## 2020-12-07 DIAGNOSIS — Z36.9 ANTENATAL SCREENING ENCOUNTER: ICD-10-CM

## 2020-12-07 LAB
ALBUMIN SERPL-MCNC: 3.4 G/DL (ref 3.5–5.2)
ALBUMIN/GLOB SERPL: 1.3 G/DL
ALP SERPL-CCNC: 81 U/L (ref 39–117)
ALT SERPL W P-5'-P-CCNC: 14 U/L (ref 1–33)
ANION GAP SERPL CALCULATED.3IONS-SCNC: 7.4 MMOL/L (ref 5–15)
AST SERPL-CCNC: 16 U/L (ref 1–32)
BILIRUB SERPL-MCNC: 0.2 MG/DL (ref 0–1.2)
BILIRUB UR QL STRIP: NEGATIVE
BLOODY SPECIMEN?: NO
BUN SERPL-MCNC: 9 MG/DL (ref 6–20)
BUN/CREAT SERPL: 18.8 (ref 7–25)
CALCIUM SPEC-SCNC: 8.1 MG/DL (ref 8.6–10.5)
CHLORIDE SERPL-SCNC: 103 MMOL/L (ref 98–107)
CLARITY UR: CLEAR
CO2 SERPL-SCNC: 22.6 MMOL/L (ref 22–29)
COLOR UR: YELLOW
CREAT SERPL-MCNC: 0.48 MG/DL (ref 0.57–1)
DEPRECATED RDW RBC AUTO: 37.8 FL (ref 37–54)
ERYTHROCYTE [DISTWIDTH] IN BLOOD BY AUTOMATED COUNT: 12.1 % (ref 12.3–15.4)
FIBRONECTIN FETAL VAG QL: NEGATIVE
GFR SERPL CREATININE-BSD FRML MDRD: >150 ML/MIN/1.73
GLOBULIN UR ELPH-MCNC: 2.7 GM/DL
GLUCOSE SERPL-MCNC: 75 MG/DL (ref 65–99)
GLUCOSE UR STRIP-MCNC: NEGATIVE MG/DL
HCT VFR BLD AUTO: 29.9 % (ref 34–46.6)
HGB BLD-MCNC: 10.1 G/DL (ref 12–15.9)
HGB UR QL STRIP.AUTO: NEGATIVE
KETONES UR QL STRIP: NEGATIVE
LEUKOCYTE ESTERASE UR QL STRIP.AUTO: NEGATIVE
LYMPHOCYTES # BLD MANUAL: 0.97 10*3/MM3 (ref 0.7–3.1)
LYMPHOCYTES NFR BLD MANUAL: 11.1 % (ref 19.6–45.3)
LYMPHOCYTES NFR BLD MANUAL: 3 % (ref 5–12)
MCH RBC QN AUTO: 29.4 PG (ref 26.6–33)
MCHC RBC AUTO-ENTMCNC: 33.8 G/DL (ref 31.5–35.7)
MCV RBC AUTO: 86.9 FL (ref 79–97)
MONOCYTES # BLD AUTO: 0.26 10*3/MM3 (ref 0.1–0.9)
NEUTROPHILS # BLD AUTO: 7.53 10*3/MM3 (ref 1.7–7)
NEUTROPHILS NFR BLD MANUAL: 85.9 % (ref 42.7–76)
NITRITE UR QL STRIP: NEGATIVE
PH UR STRIP.AUTO: 6 [PH] (ref 5–8)
PLAT MORPH BLD: NORMAL
PLATELET # BLD AUTO: 167 10*3/MM3 (ref 140–450)
PMV BLD AUTO: 10.7 FL (ref 6–12)
POTASSIUM SERPL-SCNC: 3.7 MMOL/L (ref 3.5–5.2)
PROT SERPL-MCNC: 6.1 G/DL (ref 6–8.5)
PROT UR QL STRIP: ABNORMAL
RBC # BLD AUTO: 3.44 10*6/MM3 (ref 3.77–5.28)
RBC MORPH BLD: NORMAL
SODIUM SERPL-SCNC: 133 MMOL/L (ref 136–145)
SP GR UR STRIP: 1.03 (ref 1–1.03)
UROBILINOGEN UR QL STRIP: ABNORMAL
WBC # BLD AUTO: 8.77 10*3/MM3 (ref 3.4–10.8)
WBC MORPH BLD: NORMAL

## 2020-12-07 PROCEDURE — G0008 ADMIN INFLUENZA VIRUS VAC: HCPCS | Performed by: OBSTETRICS & GYNECOLOGY

## 2020-12-07 PROCEDURE — 90686 IIV4 VACC NO PRSV 0.5 ML IM: CPT | Performed by: OBSTETRICS & GYNECOLOGY

## 2020-12-07 PROCEDURE — 85027 COMPLETE CBC AUTOMATED: CPT | Performed by: OBSTETRICS & GYNECOLOGY

## 2020-12-07 PROCEDURE — 80053 COMPREHEN METABOLIC PANEL: CPT | Performed by: OBSTETRICS & GYNECOLOGY

## 2020-12-07 PROCEDURE — 81003 URINALYSIS AUTO W/O SCOPE: CPT | Performed by: OBSTETRICS & GYNECOLOGY

## 2020-12-07 PROCEDURE — 25010000002 INFLUENZA VAC SPLIT QUAD 0.5 ML SUSPENSION PREFILLED SYRINGE: Performed by: OBSTETRICS & GYNECOLOGY

## 2020-12-07 PROCEDURE — 82731 ASSAY OF FETAL FIBRONECTIN: CPT | Performed by: OBSTETRICS & GYNECOLOGY

## 2020-12-07 PROCEDURE — 85007 BL SMEAR W/DIFF WBC COUNT: CPT | Performed by: OBSTETRICS & GYNECOLOGY

## 2020-12-07 PROCEDURE — 87086 URINE CULTURE/COLONY COUNT: CPT | Performed by: OBSTETRICS & GYNECOLOGY

## 2020-12-07 PROCEDURE — 99283 EMERGENCY DEPT VISIT LOW MDM: CPT | Performed by: OBSTETRICS & GYNECOLOGY

## 2020-12-07 RX ADMIN — INFLUENZA VIRUS VACCINE 0.5 ML: 15; 15; 15; 15 SUSPENSION INTRAMUSCULAR at 22:20

## 2020-12-07 NOTE — TELEPHONE ENCOUNTER
Oly,     With mucinex or robitussin, idea is the same. Treat what the specific symptoms are -     1) the mucinex/robitussin is designed to break up mucous   2) Anti-histamine would be to help dry out her nose if runny nose an issue  3) Acetaminophen or tylenol would be for muscle aches, fever if those a concern   4) Sudafed is a drying agent and typically only use if anti-histamine does not work and not having issues with high blood pressure.   5) Dextramorphan (DM) - is cough suppressant     So find a preparation with what she needs to treat and try to avoid active medications for issues she does not have. Please let her know.     Thanks,   Dr. Rosales

## 2020-12-07 NOTE — TELEPHONE ENCOUNTER
Dr Rosales,   Patient has a cold, and wants to know what she can take?  Advised per medlist robitussin, sudafed and mucinex, she wants to know if there is a specific mucinex?

## 2020-12-08 LAB — BACTERIA SPEC AEROBE CULT: NO GROWTH

## 2020-12-08 NOTE — OBED NOTES
"SYLVIA Note [facility] [place] [facility]    Patient Name: Anaya Conrad  YOB: 2000  MRN: [id number]  Admission Date: 2020  8:17 PM  Date of Service: 2020    Chief Complaint: RLQ pain that increases with walking, pelvic cramping, frequent utination      Subjective     Anaya Conrad is a 20 y.o. female  at 25w6d with Estimated Date of Delivery: 3/16/21 who presents with the chief complaint listed above. Pt reports she has had this RLQ pain that increases with activity or position change for a bout a weekbut at 730 PM got really bad. Pt also reports cramping and frequent urination with sense of incomplete emptying. She reports chronic constipation with recent small BM , last \"good\" one was 3 days ago. She denies fever, appetite changes, changes in bowel, no N/V.     She sees Gary Rosales MD for her prenatal care. Her pregnancy has been complicated by:  RNI, insufficient weight gain    She describes fetal movement as normal.  She denies rupture of membranes.  She denies vaginal bleeding. She is not feeling contractions but having cramping.        Objective   There are no active problems to display for this patient.       OB History    Para Term  AB Living   1 0 0 0 0 0   SAB TAB Ectopic Molar Multiple Live Births   0 0 0 0 0 0      # Outcome Date GA Lbr Joby/2nd Weight Sex Delivery Anes PTL Lv   1 Current                 Past Medical History:   Diagnosis Date   • Anemia    • Hypertension    • Migraines    • UTI (urinary tract infection)        No past surgical history on file.    No current facility-administered medications on file prior to encounter.      Current Outpatient Medications on File Prior to Encounter   Medication Sig Dispense Refill   • Banophen 50 MG capsule TK 1 C PO ATN PRF NAUSEA     • MAGnesium-Oxide 400 (241.3 Mg) MG tablet tablet      • Prenatal 28-0.8 MG tablet Take 1 tablet by mouth Daily. Please use formulary or generic, with DHA ideal 90 each 3   • vitamin B-6 " (PYRIDOXINE) 100 MG tablet Take 1 tablet by mouth Daily. 30 tablet 2       No Known Allergies    Family History   Problem Relation Age of Onset   • Breast cancer Neg Hx    • Ovarian cancer Neg Hx    • Uterine cancer Neg Hx    • Colon cancer Neg Hx    • Deep vein thrombosis Neg Hx    • Pulmonary embolism Neg Hx        Social History     Socioeconomic History   • Marital status: Single     Spouse name: Not on file   • Number of children: Not on file   • Years of education: Not on file   • Highest education level: Not on file   Tobacco Use   • Smoking status: Current Every Day Smoker     Packs/day: 0.10     Years: 1.00     Pack years: 0.10     Types: Cigarettes   • Smokeless tobacco: Never Used   Substance and Sexual Activity   • Alcohol use: Not Currently   • Drug use: No   • Sexual activity: Yes     Partners: Male           Review of Systems   Constitutional: Negative for chills and fever.   HENT: Negative.    Eyes: Negative for photophobia and visual disturbance.   Respiratory: Negative for shortness of breath.    Cardiovascular: Negative for chest pain.   Gastrointestinal: Positive for abdominal pain. Negative for nausea.   Psychiatric/Behavioral: The patient is not nervous/anxious.           PHYSICAL EXAM:      VITAL SIGNS:  Vitals:    12/07/20 2020   Weight: 52.2 kg (115 lb)            FHT'S:    140s with 10 BTB variability, no decels                                     PHYSICAL EXAM:      General: well developed; well nourished  no acute distress   Heart: Not performed.   Lungs   breathing is unlabored   Abdomen: Gravid and non tender , even to deep palpation    No CVAT noted  Extremities: trace edema, DTRs 1 plus, no clonus       Cervix: Per RN  Cervical Dilation (cm): 0  Cervical Effacement: 0%  Cervical Consistency: medium  Cervical Position: posterior     Contractions:   slight irritability                    LABS AND TESTING ORDERED:  1. Uterine and fetal monitoring  2. Urinalysis  3. CBC, CMP, FFN    LAB  RESULTS:    No results found for this or any previous visit (from the past 24 hour(s)).    Lab Results   Component Value Date    ABO O 2020    RH Positive 2020       No results found for: STREPGPB              External Prenatal Results     Pregnancy Outside Results - Transcribed From Office Records - See Scanned Records For Details     Test Value Date Time    Hgb 13.0 g/dL 20 0036      13.2 g/dL 20 2354      11.9 g/dL 20 1053    Hct 40.3 % 20 0036      38.8 % 20 2354      36.7 % 20 1053    ABO O  20 1053    Rh Positive  20 1053    Antibody Screen Negative  20 1053    Glucose Fasting GTT       Glucose Tolerance Test 1 hour       Glucose Tolerance Test 3 hour       Gonorrhea (discrete) Negative  20 1135    Chlamydia (discrete) Negative  20 1135    RPR Non Reactive  20 1053    VDRL       Syphilis Antibody       Rubella <0.90 index 20 1053    HBsAg Negative  20 1053    Herpes Simplex Virus PCR       Herpes Simplex VIrus Culture       HIV Non Reactive  20 1053    Hep C RNA Quant PCR       Hep C Antibody <0.1 s/co ratio 20 1053    AFP       Group B Strep       GBS Susceptibility to Clindamycin       GBS Susceptibility to Erythromycin       Fetal Fibronectin       Genetic Testing, Maternal Blood             Drug Screening     Test Value Date Time    Urine Drug Screen       Amphetamine Screen       Barbiturate Screen       Benzodiazepine Screen       Methadone Screen       Phencyclidine Screen       Opiates Screen       THC Screen       Cocaine Screen       Propoxyphene Screen       Buprenorphine Screen       Methamphetamine Screen       Oxycodone Screen       Tricyclic Antidepressants Screen                 WBC,FFN, ua all neg    Impression:   @ 25w6d .  Final Diagnosis: RLQ pain, probable round ligament pain    Plan:  1. Discharge to home.    2. Plan of care has been reviewed with patient along with risks,  benefits of treatment.   All questions have been answered. Call health care provider for any further concerns and keep office appointments.  3. Comfort measures, PTL precautions reviewed      I discussed the patients findings and my recommendations with patient and nursing staff      Yeni White MD  12/7/2020  20:48 EST

## 2020-12-08 NOTE — NURSING NOTE
Discharge instructions reviewed with patient. Reviewed round ligament pain, increasing water intake, rest and keeping her next scheduled appt. Explained when to call MD with questions or concerns and/or returning to L&D for evaluation. Patient verbalizes understanding and denies further questions or concerns. Cindy Palma RN

## 2020-12-08 NOTE — TELEPHONE ENCOUNTER
Patient calling in with same complaints of a head cold. Reiterated that she may take mucinex/robitussin for breaking up mucus, sudafed for a drying agent, dextramorphan for cough suppressant. Patient states that she was going to get Robitussin DM and asked if that is okay. I discussed that if it treats what is going on, those are both appropriate medications. Patient indicated understanding. Asked if patient had any additional questions and she reports that she is about to go to the hospital because of ongoing abdominal cramping. She reports it started today. She reports some vaginal bleeding following intercourse with her partner yesterday. Denies leakage of fluid. Reports that she has not felt her baby move a lot today. Advised if her pain worsens and she doesn't feel fetal movement, she should be evaluated. She indicated understanding and all questions and concerns answered.    Kellie Bentley MD

## 2020-12-10 ENCOUNTER — ROUTINE PRENATAL (OUTPATIENT)
Dept: OBSTETRICS AND GYNECOLOGY | Facility: CLINIC | Age: 20
End: 2020-12-10

## 2020-12-10 VITALS — WEIGHT: 115 LBS | DIASTOLIC BLOOD PRESSURE: 77 MMHG | BODY MASS INDEX: 21.03 KG/M2 | SYSTOLIC BLOOD PRESSURE: 116 MMHG

## 2020-12-10 DIAGNOSIS — O26.12 INSUFFICIENT WEIGHT GAIN DURING PREGNANCY IN SECOND TRIMESTER: ICD-10-CM

## 2020-12-10 DIAGNOSIS — O47.00 PRETERM UTERINE CONTRACTIONS, ANTEPARTUM: ICD-10-CM

## 2020-12-10 DIAGNOSIS — Z34.02 ENCOUNTER FOR SUPERVISION OF NORMAL FIRST PREGNANCY IN SECOND TRIMESTER: Primary | ICD-10-CM

## 2020-12-10 LAB
GLUCOSE UR STRIP-MCNC: NEGATIVE MG/DL
PROT UR STRIP-MCNC: NEGATIVE MG/DL

## 2020-12-10 PROCEDURE — 99213 OFFICE O/P EST LOW 20 MIN: CPT | Performed by: OBSTETRICS & GYNECOLOGY

## 2020-12-10 NOTE — PROGRESS NOTES
"OB follow up     Anaya Conrad is a 20 y.o.  26w2d being seen today for her obstetrical visit.  Patient reports occasional contractions. Fetal movement: normal.    Her prenatal care is complicated by (and status): contractions   Seen on  in OB ED for \"abdirashid pugh contractions\"   Given work note.   Needs note for work for return to work     Review of Systems  Cramping/contractions : intermittent   Vaginal bleeding: none   Fetal movement good     /77   Wt 52.2 kg (115 lb)   LMP 2020   BMI 21.03 kg/m²     FHT: 154 BPM   Uterine Size: 26 cm       Assessment    Diagnoses and all orders for this visit:    1. Encounter for supervision of normal first pregnancy in second trimester (Primary)    2. Insufficient weight gain during pregnancy in second trimester    3.  uterine contractions, antepartum        1) pregnancy at 26w2d   2) Insufficient weight gain, + interval gain   3)  contractions, undelivered   Discussed  labor warnings and abdirashid pugh contractions   Okay to return to work         Plan    Reviewed this stage of pregnancy  Problem list updated   Follow up - keep upcoming appointment.     Gary Rosales MD   12/10/2020  13:58 EST    "

## 2020-12-15 ENCOUNTER — ROUTINE PRENATAL (OUTPATIENT)
Dept: OBSTETRICS AND GYNECOLOGY | Facility: CLINIC | Age: 20
End: 2020-12-15

## 2020-12-15 VITALS — WEIGHT: 116 LBS | BODY MASS INDEX: 21.22 KG/M2 | DIASTOLIC BLOOD PRESSURE: 54 MMHG | SYSTOLIC BLOOD PRESSURE: 110 MMHG

## 2020-12-15 DIAGNOSIS — O26.12 INSUFFICIENT WEIGHT GAIN DURING PREGNANCY IN SECOND TRIMESTER: ICD-10-CM

## 2020-12-15 DIAGNOSIS — Z34.02 ENCOUNTER FOR SUPERVISION OF NORMAL FIRST PREGNANCY IN SECOND TRIMESTER: Primary | ICD-10-CM

## 2020-12-15 LAB
GLUCOSE UR STRIP-MCNC: NEGATIVE MG/DL
PROT UR STRIP-MCNC: NEGATIVE MG/DL

## 2020-12-15 PROCEDURE — 99213 OFFICE O/P EST LOW 20 MIN: CPT | Performed by: OBSTETRICS & GYNECOLOGY

## 2020-12-15 RX ORDER — SWAB
1 SWAB, NON-MEDICATED MISCELLANEOUS DAILY
COMMUNITY
Start: 2020-07-17 | End: 2021-04-09

## 2020-12-15 RX ORDER — AMOXICILLIN 875 MG/1
875 TABLET, COATED ORAL 2 TIMES DAILY
COMMUNITY
Start: 2020-12-11 | End: 2021-01-15

## 2020-12-15 NOTE — PROGRESS NOTES
OB follow up     Anaya Conrad is a 20 y.o.  27w0d being seen today for her obstetrical visit.  Patient reports no complaints. Fetal movement: normal.    Her prenatal care is complicated by (and status): Insufficient weight gain    Review of Systems  Cramping/contractions : none   Vaginal bleeding: none   Fetal movement good     /54   Wt 52.6 kg (116 lb)   LMP 2020   BMI 21.22 kg/m²     FHT: 144  BPM   Uterine Size: 25 cm       Assessment    Diagnoses and all orders for this visit:    1. Encounter for supervision of normal first pregnancy in second trimester (Primary)    2. Insufficient weight gain during pregnancy in second trimester        1) pregnancy at 27w0d   Rh+, BS and CBC today   Peds, prenatal classes and tours  TDaP and flu recommended   Encouraged questions about L&D, anesthesia, breast feeding and birth control   2) Insufficient weight gain   Sill and steady progress         Plan    Reviewed this stage of pregnancy  Problem list updated   Follow up in 2 weeks    Gary Rosales MD   12/15/2020  09:54 EST

## 2020-12-16 ENCOUNTER — TELEPHONE (OUTPATIENT)
Dept: OBSTETRICS AND GYNECOLOGY | Facility: CLINIC | Age: 20
End: 2020-12-16

## 2020-12-16 LAB
BASOPHILS # BLD AUTO: 0.03 10*3/MM3 (ref 0–0.2)
BASOPHILS NFR BLD AUTO: 0.4 % (ref 0–1.5)
EOSINOPHIL # BLD AUTO: 0.12 10*3/MM3 (ref 0–0.4)
EOSINOPHIL NFR BLD AUTO: 1.5 % (ref 0.3–6.2)
ERYTHROCYTE [DISTWIDTH] IN BLOOD BY AUTOMATED COUNT: 11.8 % (ref 12.3–15.4)
GLUCOSE 1H P 50 G GLC PO SERPL-MCNC: 174 MG/DL (ref 65–139)
HCT VFR BLD AUTO: 29 % (ref 34–46.6)
HGB BLD-MCNC: 9.5 G/DL (ref 12–15.9)
IMM GRANULOCYTES # BLD AUTO: 0.1 10*3/MM3 (ref 0–0.05)
IMM GRANULOCYTES NFR BLD AUTO: 1.2 % (ref 0–0.5)
LYMPHOCYTES # BLD AUTO: 2.14 10*3/MM3 (ref 0.7–3.1)
LYMPHOCYTES NFR BLD AUTO: 25.9 % (ref 19.6–45.3)
MCH RBC QN AUTO: 29 PG (ref 26.6–33)
MCHC RBC AUTO-ENTMCNC: 32.8 G/DL (ref 31.5–35.7)
MCV RBC AUTO: 88.4 FL (ref 79–97)
MONOCYTES # BLD AUTO: 0.53 10*3/MM3 (ref 0.1–0.9)
MONOCYTES NFR BLD AUTO: 6.4 % (ref 5–12)
NEUTROPHILS # BLD AUTO: 5.34 10*3/MM3 (ref 1.7–7)
NEUTROPHILS NFR BLD AUTO: 64.6 % (ref 42.7–76)
NRBC BLD AUTO-RTO: 0 /100 WBC (ref 0–0.2)
PLATELET # BLD AUTO: 176 10*3/MM3 (ref 140–450)
RBC # BLD AUTO: 3.28 10*6/MM3 (ref 3.77–5.28)
WBC # BLD AUTO: 8.26 10*3/MM3 (ref 3.4–10.8)

## 2020-12-16 RX ORDER — FERROUS SULFATE 325(65) MG
325 TABLET ORAL
Qty: 30 TABLET | Refills: 6 | Status: SHIPPED | OUTPATIENT
Start: 2020-12-16 | End: 2021-03-10 | Stop reason: HOSPADM

## 2020-12-16 NOTE — TELEPHONE ENCOUNTER
----- Message from Gary Rosales MD sent at 12/16/2020  8:57 AM EST -----  Oly, two issues 1) She is moderately anemic with Hg of 9.5 grams. Needs to be on iron in AM and vitamin later in the day to try and stabilize her Hg. Will will likely recheck down the road. 2) Failed 1 hour with 174.  So needs to do 3 hour, order in Epic. Please help her arrange. Thanks, Dr. Rosales

## 2020-12-29 ENCOUNTER — ROUTINE PRENATAL (OUTPATIENT)
Dept: OBSTETRICS AND GYNECOLOGY | Facility: CLINIC | Age: 20
End: 2020-12-29

## 2020-12-29 VITALS — DIASTOLIC BLOOD PRESSURE: 63 MMHG | BODY MASS INDEX: 21.4 KG/M2 | SYSTOLIC BLOOD PRESSURE: 108 MMHG | WEIGHT: 117 LBS

## 2020-12-29 DIAGNOSIS — Z34.03 ENCOUNTER FOR SUPERVISION OF NORMAL FIRST PREGNANCY IN THIRD TRIMESTER: Primary | ICD-10-CM

## 2020-12-29 DIAGNOSIS — O99.013 ANEMIA OF MOTHER IN PREGNANCY, ANTEPARTUM, THIRD TRIMESTER: ICD-10-CM

## 2020-12-29 DIAGNOSIS — O26.13 INSUFFICIENT WEIGHT GAIN DURING PREGNANCY IN THIRD TRIMESTER: ICD-10-CM

## 2020-12-29 LAB
GLUCOSE UR STRIP-MCNC: ABNORMAL MG/DL
PROT UR STRIP-MCNC: NEGATIVE MG/DL

## 2020-12-29 PROCEDURE — 99214 OFFICE O/P EST MOD 30 MIN: CPT | Performed by: OBSTETRICS & GYNECOLOGY

## 2020-12-29 NOTE — PROGRESS NOTES
OB follow up     Anaya Conrad is a 20 y.o.  29w0d being seen today for her obstetrical visit.  Patient reports no complaints. Fetal movement: normal.    Her prenatal care is complicated by (and status): insufficient weight gain, anemia     Review of Systems  Cramping/contractions : none   Vaginal bleeding: none   Fetal movement good     /63   Wt 53.1 kg (117 lb)   LMP 2020   BMI 21.40 kg/m²     FHT: 134 BPM   Uterine Size: 27 cm       Assessment    Diagnoses and all orders for this visit:    1. Encounter for supervision of normal first pregnancy in third trimester (Primary)    2. Insufficient weight gain during pregnancy in third trimester    3. Anemia of mother in pregnancy, antepartum, third trimester        1) pregnancy at 29w0d  Rh+    Glucosuria from 3 hour likely   2) failed one hour, doing - 3 hr GTT today  Expectations reviewed.   3) Moderate anemia in pregnancy   Hg to 9.5 grams,   Oral iron encouraged, repeat CBC and Ferritin at 32-34 weeks   4) Insufficient weight gain   1# interval gain in 2 weeks   Will continue to encourage gain, check growth 32-34 weeks         Plan    Reviewed this stage of pregnancy  Problem list updated   Follow up in 2 weeks    Gary Rosales MD   2020  11:10 EST

## 2020-12-30 ENCOUNTER — TELEPHONE (OUTPATIENT)
Dept: OBSTETRICS AND GYNECOLOGY | Facility: CLINIC | Age: 20
End: 2020-12-30

## 2020-12-30 PROBLEM — O24.419 GESTATIONAL DIABETES MELLITUS (GDM) IN THIRD TRIMESTER: Status: ACTIVE | Noted: 2020-12-30

## 2020-12-30 LAB
GLUCOSE 1H P 100 G GLC PO SERPL-MCNC: 194 MG/DL (ref 65–179)
GLUCOSE 2H P 100 G GLC PO SERPL-MCNC: 224 MG/DL (ref 65–154)
GLUCOSE 3H P 100 G GLC PO SERPL-MCNC: 168 MG/DL (ref 65–139)
GLUCOSE P FAST SERPL-MCNC: 70 MG/DL (ref 65–94)

## 2020-12-30 RX ORDER — LANCETS 30 GAUGE
1 EACH MISCELLANEOUS 4 TIMES DAILY
Qty: 120 EACH | Refills: 3 | Status: SHIPPED | OUTPATIENT
Start: 2020-12-30 | End: 2021-04-09

## 2020-12-30 RX ORDER — BLOOD-GLUCOSE METER
1 KIT MISCELLANEOUS 4 TIMES DAILY
Qty: 1 EACH | Refills: 0 | Status: SHIPPED | OUTPATIENT
Start: 2020-12-30 | End: 2021-04-09

## 2020-12-30 RX ORDER — GLUCOSAMINE HCL/CHONDROITIN SU 500-400 MG
1 CAPSULE ORAL 4 TIMES DAILY
Qty: 120 EACH | Refills: 3 | Status: SHIPPED | OUTPATIENT
Start: 2020-12-30 | End: 2021-03-10 | Stop reason: HOSPADM

## 2020-12-30 NOTE — TELEPHONE ENCOUNTER
Pt informed of 3 hr GTT results.  Pt asked if the soda she drank before she came in would have caused false results.  She has additional questions.  Please call pt at your earliest convenience.     Pt # [phone]

## 2020-12-30 NOTE — TELEPHONE ENCOUNTER
----- Message from Oly Estrada MA sent at 12/30/2020  2:36 PM EST -----  L/m for pt/mabel  ----- Message -----  From: Gary Rosales MD  Sent: 12/30/2020   8:56 AM EST  To: AMOS Sewell, she has failed her 3 hour. She is now diabetic. I am sending in supplies to start testing. Can she come see Gabriella today?  Thanks, Dr. Rosales

## 2021-01-05 ENCOUNTER — TELEPHONE (OUTPATIENT)
Dept: OBSTETRICS AND GYNECOLOGY | Facility: CLINIC | Age: 21
End: 2021-01-05

## 2021-01-05 NOTE — TELEPHONE ENCOUNTER
Areli,     She can either repeat or we need to see and make sure she understands what numbers we are after and make sure she is testing at the right times for pregnancy.     Please let her know. I prefer to review in office after diagnosis and make sure on same page.     Thanks,   Dr. Rosales

## 2021-01-05 NOTE — TELEPHONE ENCOUNTER
Pt called back and stated that she did not want to r/s because she has been checking her numbers since she got the machine and none of them have been high.  She wants to wait and discuss with  on 1/15/21.jmr    Dr.Scobee- CHAN

## 2021-01-07 ENCOUNTER — TELEPHONE (OUTPATIENT)
Dept: OBSTETRICS AND GYNECOLOGY | Facility: CLINIC | Age: 21
End: 2021-01-07

## 2021-01-07 NOTE — TELEPHONE ENCOUNTER
Left message to call office, see message from yesterday.  Pt was a no show for her diabetic counseling for today.   requested that we attempt to schedule an appointment with him sooner to discuss.[email or web address]

## 2021-01-15 ENCOUNTER — ROUTINE PRENATAL (OUTPATIENT)
Dept: OBSTETRICS AND GYNECOLOGY | Facility: CLINIC | Age: 21
End: 2021-01-15

## 2021-01-15 VITALS — SYSTOLIC BLOOD PRESSURE: 110 MMHG | BODY MASS INDEX: 22.86 KG/M2 | WEIGHT: 125 LBS | DIASTOLIC BLOOD PRESSURE: 67 MMHG

## 2021-01-15 DIAGNOSIS — O99.013 ANEMIA OF MOTHER IN PREGNANCY, ANTEPARTUM, THIRD TRIMESTER: ICD-10-CM

## 2021-01-15 DIAGNOSIS — O26.13 INSUFFICIENT WEIGHT GAIN DURING PREGNANCY IN THIRD TRIMESTER: ICD-10-CM

## 2021-01-15 DIAGNOSIS — O26.843 FUNDAL HEIGHT LOW FOR DATES IN THIRD TRIMESTER: ICD-10-CM

## 2021-01-15 DIAGNOSIS — Z34.03 ENCOUNTER FOR SUPERVISION OF NORMAL FIRST PREGNANCY IN THIRD TRIMESTER: Primary | ICD-10-CM

## 2021-01-15 DIAGNOSIS — O24.410 DIET CONTROLLED GESTATIONAL DIABETES MELLITUS (GDM) IN THIRD TRIMESTER: ICD-10-CM

## 2021-01-15 LAB
GLUCOSE UR STRIP-MCNC: NEGATIVE MG/DL
PROT UR STRIP-MCNC: NEGATIVE MG/DL

## 2021-01-15 PROCEDURE — 99214 OFFICE O/P EST MOD 30 MIN: CPT | Performed by: OBSTETRICS & GYNECOLOGY

## 2021-01-15 RX ORDER — BLOOD-GLUCOSE METER
1 EACH MISCELLANEOUS 4 TIMES DAILY
COMMUNITY
Start: 2020-12-30 | End: 2021-02-16

## 2021-01-15 RX ORDER — CHLORPHENIR/PHENYLEPH/ASPIRIN 2-7.8-325
TABLET, EFFERVESCENT ORAL
COMMUNITY
Start: 2020-12-30 | End: 2021-03-10 | Stop reason: HOSPADM

## 2021-01-15 NOTE — PROGRESS NOTES
OB follow up     Anaya Conrad is a 20 y.o.  31w3d being seen today for her obstetrical visit.  Patient reports no complaints. Fetal movement: normal.    Her prenatal care is complicated by (and status): anemia, gestational diabetes       Review of Systems  Cramping/contractions : none   Vaginal bleeding: none   Fetal movement good     /67   Wt 56.7 kg (125 lb)   LMP 2020   BMI 22.86 kg/m²     FHT: 156 BPM   Uterine Size: 29 cm       Assessment    Diagnoses and all orders for this visit:    1. Encounter for supervision of normal first pregnancy in third trimester (Primary)    2. Anemia of mother in pregnancy, antepartum, third trimester  -     CBC & Differential  -     Ferritin    3. Diet controlled gestational diabetes mellitus (GDM) in third trimester  -     US Ob Follow Up Transabdominal Approach    4. Insufficient weight gain during pregnancy in third trimester    5. Fundal height low for dates in third trimester  -     US Ob Follow Up Transabdominal Approach        1) pregnancy at 31w3d   2) Gestational Diabetes   - Fasting 75   - Fasting 84, Lunch -120   - Fasting 82, Dinner - 150   Discussed timing of her BS   Discussed goal ranges  Most seem to be in range, need better tracking for after meals, she is doing Bedtime, not necessary   Will check growth next visit.   Given tracking instructions.   Bring log next week to review over time and see if going to need medication to assist in keeping BS under control.  3) Moderate anemia - will need to recheck CBC and ferritin next week   4) Insufficient weight gain, better recently   5) Fundal height low  Check growth next week with visit.     Plan    Reviewed this stage of pregnancy  Problem list updated   Follow up in 1 weeks    Gary Rosales MD   1/15/2021  11:55 EST

## 2021-01-20 ENCOUNTER — TELEPHONE (OUTPATIENT)
Dept: OBSTETRICS AND GYNECOLOGY | Facility: CLINIC | Age: 21
End: 2021-01-20

## 2021-01-22 ENCOUNTER — ROUTINE PRENATAL (OUTPATIENT)
Dept: OBSTETRICS AND GYNECOLOGY | Facility: CLINIC | Age: 21
End: 2021-01-22

## 2021-01-22 VITALS — SYSTOLIC BLOOD PRESSURE: 114 MMHG | DIASTOLIC BLOOD PRESSURE: 64 MMHG | WEIGHT: 122.4 LBS | BODY MASS INDEX: 22.39 KG/M2

## 2021-01-22 DIAGNOSIS — O24.410 DIET CONTROLLED GESTATIONAL DIABETES MELLITUS (GDM) IN THIRD TRIMESTER: ICD-10-CM

## 2021-01-22 DIAGNOSIS — Z34.03 ENCOUNTER FOR SUPERVISION OF NORMAL FIRST PREGNANCY IN THIRD TRIMESTER: Primary | ICD-10-CM

## 2021-01-22 DIAGNOSIS — O26.843 FUNDAL HEIGHT LOW FOR DATES IN THIRD TRIMESTER: ICD-10-CM

## 2021-01-22 DIAGNOSIS — O99.013 ANEMIA OF MOTHER IN PREGNANCY, ANTEPARTUM, THIRD TRIMESTER: ICD-10-CM

## 2021-01-22 LAB
GLUCOSE UR STRIP-MCNC: NEGATIVE MG/DL
PROT UR STRIP-MCNC: NEGATIVE MG/DL

## 2021-01-22 PROCEDURE — 99213 OFFICE O/P EST LOW 20 MIN: CPT | Performed by: OBSTETRICS & GYNECOLOGY

## 2021-01-22 NOTE — PROGRESS NOTES
OB follow up     Anaya Conrad is a 20 y.o.  32w3d being seen today for her obstetrical visit.  Patient reports back pain and lower leg swelling . Fetal movement: normal.    Her prenatal care is complicated by (and status): anemia and gestational DM    Review of Systems  Cramping/contractions : mild  Vaginal bleeding: none   Fetal movement good     /64   Wt 55.5 kg (122 lb 6.4 oz)   LMP 2020   BMI 22.39 kg/m²     FHT: 144 BPM   Uterine Size: 29 cm       Assessment    Diagnoses and all orders for this visit:    1. Encounter for supervision of normal first pregnancy in third trimester (Primary)    2. Diet controlled gestational diabetes mellitus (GDM) in third trimester    3. Fundal height low for dates in third trimester    4. Anemia of mother in pregnancy, antepartum, third trimester  -     CBC & Differential; Future  -     Ferritin; Future        1) pregnancy at 32w3d   2) Gest DM, diet controlled  Fasting in range with 1 of 7 borderline elevated   All post prandial almost all in range as well.   Continue to monitor but looks well controlled on diet   3) FH low   Growth AGA, overall 45% and A/C 47% - Normal TOM and cephalic   4) Anemia, moderate and on oral iron.   Repeat with ferritin next visit       Plan    Reviewed this stage of pregnancy  Problem list updated   Follow up in 2 weeks    Gary Rosales MD   2021  09:13 EST

## 2021-01-29 ENCOUNTER — TELEPHONE (OUTPATIENT)
Dept: OBSTETRICS AND GYNECOLOGY | Facility: CLINIC | Age: 21
End: 2021-01-29

## 2021-01-29 NOTE — TELEPHONE ENCOUNTER
Oly,     Can we see if pharmacy can change or we need to do PA since pregnant with gestational diabetes to cover her blood sugar test strips.     Thanks,   Dr. Rosales

## 2021-02-09 ENCOUNTER — ROUTINE PRENATAL (OUTPATIENT)
Dept: OBSTETRICS AND GYNECOLOGY | Facility: CLINIC | Age: 21
End: 2021-02-09

## 2021-02-09 VITALS — BODY MASS INDEX: 23.23 KG/M2 | DIASTOLIC BLOOD PRESSURE: 71 MMHG | WEIGHT: 127 LBS | SYSTOLIC BLOOD PRESSURE: 117 MMHG

## 2021-02-09 DIAGNOSIS — Z34.03 ENCOUNTER FOR SUPERVISION OF NORMAL FIRST PREGNANCY IN THIRD TRIMESTER: Primary | ICD-10-CM

## 2021-02-09 DIAGNOSIS — O24.410 DIET CONTROLLED GESTATIONAL DIABETES MELLITUS (GDM) IN THIRD TRIMESTER: ICD-10-CM

## 2021-02-09 DIAGNOSIS — O99.013 ANEMIA OF MOTHER IN PREGNANCY, ANTEPARTUM, THIRD TRIMESTER: ICD-10-CM

## 2021-02-09 DIAGNOSIS — O26.843 FUNDAL HEIGHT LOW FOR DATES IN THIRD TRIMESTER: ICD-10-CM

## 2021-02-09 LAB
GLUCOSE UR STRIP-MCNC: NEGATIVE MG/DL
PROT UR STRIP-MCNC: NEGATIVE MG/DL

## 2021-02-09 PROCEDURE — 99214 OFFICE O/P EST MOD 30 MIN: CPT | Performed by: OBSTETRICS & GYNECOLOGY

## 2021-02-09 NOTE — PROGRESS NOTES
Ob follow up    Anaya Conrad is a 20 y.o.  35w0d patient being seen today for her obstetrical visit. Patient reports no complaints. Fetal movement: normal.    Her prenatal care is complicated by (and status) : anemia and gestational DM       ROS -   Fetal Movement good   Vaginal bleeding none   Cramping/Contractions intermittent       /71   Wt 57.6 kg (127 lb)   LMP 2020   BMI 23.23 kg/m²     FHT:  156 BPM    Uterine Size: 31 cm   Presentations: cephalic   Pelvic Exam:     Dilation: deferred    Effacement: deferred    Station:  deferred                 Assessment    Diagnoses and all orders for this visit:    1. Encounter for supervision of normal first pregnancy in third trimester (Primary)    2. Diet controlled gestational diabetes mellitus (GDM) in third trimester    3. Fundal height low for dates in third trimester    4. Anemia of mother in pregnancy, antepartum, third trimester        1) Pregnancy at 35w0d  2) Fetal status reassuring   3) GBS status - next week   4) Gestational DM, diet controlled  Fasting all in range. Most others in range except one specific day   Will continue to monitor no intervention at this time   5) FH low   Overall has been AGA on ultrasound  6) Anemia - moderate and on oral iron   Checking Ferritin and repeat CBC today       Plan    Labor warnings   [facility] BID        Gary Rosales MD   2021  11:21 EST

## 2021-02-10 ENCOUNTER — TELEPHONE (OUTPATIENT)
Dept: OBSTETRICS AND GYNECOLOGY | Facility: CLINIC | Age: 21
End: 2021-02-10

## 2021-02-15 ENCOUNTER — TELEPHONE (OUTPATIENT)
Dept: OBSTETRICS AND GYNECOLOGY | Facility: CLINIC | Age: 21
End: 2021-02-15

## 2021-02-15 NOTE — TELEPHONE ENCOUNTER
----- Message from Oly Estrada MA sent at 2/11/2021  2:57 PM EST -----  L/m for pt/mabel  ----- Message -----  From: Oly Estrada MA  Sent: 2/10/2021   2:07 PM EST  To: Oly Estrada MA    L/m for pt/mabel  ----- Message -----  From: Gary Rosales MD  Sent: 2/10/2021  10:07 AM EST  To: AMOS Sewell, her Hg is better at 10.4 grams. Ferritin still low, but given > 10 grams on Hg can continue as we are until delivery- oral iron. Please let her know. Thanks, Dr. Rosales

## 2021-02-16 ENCOUNTER — ROUTINE PRENATAL (OUTPATIENT)
Dept: OBSTETRICS AND GYNECOLOGY | Facility: CLINIC | Age: 21
End: 2021-02-16

## 2021-02-16 VITALS — SYSTOLIC BLOOD PRESSURE: 109 MMHG | BODY MASS INDEX: 23.41 KG/M2 | DIASTOLIC BLOOD PRESSURE: 76 MMHG | WEIGHT: 128 LBS

## 2021-02-16 DIAGNOSIS — Z34.03 ENCOUNTER FOR SUPERVISION OF NORMAL FIRST PREGNANCY IN THIRD TRIMESTER: Primary | ICD-10-CM

## 2021-02-16 DIAGNOSIS — O99.013 ANEMIA OF MOTHER IN PREGNANCY, ANTEPARTUM, THIRD TRIMESTER: ICD-10-CM

## 2021-02-16 DIAGNOSIS — Z36.9 ANTENATAL SCREENING ENCOUNTER: ICD-10-CM

## 2021-02-16 DIAGNOSIS — O99.343 DEPRESSION AFFECTING PREGNANCY IN THIRD TRIMESTER, ANTEPARTUM: ICD-10-CM

## 2021-02-16 DIAGNOSIS — F32.A DEPRESSION AFFECTING PREGNANCY IN THIRD TRIMESTER, ANTEPARTUM: ICD-10-CM

## 2021-02-16 DIAGNOSIS — O24.410 DIET CONTROLLED GESTATIONAL DIABETES MELLITUS (GDM) IN THIRD TRIMESTER: ICD-10-CM

## 2021-02-16 LAB
GLUCOSE UR STRIP-MCNC: NEGATIVE MG/DL
PROT UR STRIP-MCNC: NEGATIVE MG/DL

## 2021-02-16 PROCEDURE — 99214 OFFICE O/P EST MOD 30 MIN: CPT | Performed by: OBSTETRICS & GYNECOLOGY

## 2021-02-16 RX ORDER — BLOOD-GLUCOSE METER
KIT MISCELLANEOUS 4 TIMES DAILY
COMMUNITY
Start: 2021-02-01 | End: 2021-04-09

## 2021-02-16 NOTE — PROGRESS NOTES
Ob follow up    Anaya Conrad is a 20 y.o.  36w0d patient being seen today for her obstetrical visit. Patient reports feeling depressed.. Fetal movement: normal.    Her prenatal care is complicated by (and status) : anemia, Gest DM      ROS -   Fetal Movement good   Vaginal bleeding none   Cramping/Contractions intermittent      /76   Wt 58.1 kg (128 lb)   LMP 2020   BMI 23.41 kg/m²     FHT:  134 BPM    Uterine Size: 34 cm   Presentations: cephalic   Pelvic Exam:     Dilation: 1cm    Effacement: 50%    Station:  -2                 Assessment    Diagnoses and all orders for this visit:    1. Encounter for supervision of normal first pregnancy in third trimester (Primary)    2. Diet controlled gestational diabetes mellitus (GDM) in third trimester    3. Anemia of mother in pregnancy, antepartum, third trimester    4. Depression affecting pregnancy in third trimester, antepartum    5.  screening encounter  -     Strep B Screen - , Vaginal/Rectum        1) Pregnancy at 36w0d  2) Fetal status reassuring   3) GBS status - done today  4) Gest DM, diet controlled  Did not bring log, but reporting no issues   5) Anemia in pregnancy   Repeated Hg and ferritin last week   Good Hg at 10.4 grams, Ferritin low, but Hg stable   6) Depression, discussed   Discussed counseling, exercise and medication   Going to work on herself for now, increased risk of postpartum depression discussed.     Plan    Labor warnings   [facility] BID        Gary Rosales MD   2021  12:47 EST

## 2021-02-18 PROBLEM — O99.820 GBS (GROUP B STREPTOCOCCUS CARRIER), +RV CULTURE, CURRENTLY PREGNANT: Status: ACTIVE | Noted: 2021-02-18

## 2021-02-18 LAB — GP B STREP DNA SPEC QL NAA+PROBE: POSITIVE

## 2021-02-23 ENCOUNTER — ROUTINE PRENATAL (OUTPATIENT)
Dept: OBSTETRICS AND GYNECOLOGY | Facility: CLINIC | Age: 21
End: 2021-02-23

## 2021-02-23 VITALS — WEIGHT: 128 LBS | DIASTOLIC BLOOD PRESSURE: 77 MMHG | BODY MASS INDEX: 23.41 KG/M2 | SYSTOLIC BLOOD PRESSURE: 115 MMHG

## 2021-02-23 DIAGNOSIS — O24.410 DIET CONTROLLED GESTATIONAL DIABETES MELLITUS (GDM) IN THIRD TRIMESTER: ICD-10-CM

## 2021-02-23 DIAGNOSIS — Z34.03 ENCOUNTER FOR SUPERVISION OF NORMAL FIRST PREGNANCY IN THIRD TRIMESTER: Primary | ICD-10-CM

## 2021-02-23 DIAGNOSIS — O99.820 GBS (GROUP B STREPTOCOCCUS CARRIER), +RV CULTURE, CURRENTLY PREGNANT: ICD-10-CM

## 2021-02-23 DIAGNOSIS — O99.013 ANEMIA OF MOTHER IN PREGNANCY, ANTEPARTUM, THIRD TRIMESTER: ICD-10-CM

## 2021-02-23 LAB
GLUCOSE UR STRIP-MCNC: NEGATIVE MG/DL
PROT UR STRIP-MCNC: ABNORMAL MG/DL

## 2021-02-23 PROCEDURE — 99213 OFFICE O/P EST LOW 20 MIN: CPT | Performed by: OBSTETRICS & GYNECOLOGY

## 2021-02-23 NOTE — PROGRESS NOTES
Ob follow up    Anaya Conrad is a 20 y.o.  37w0d patient being seen today for her obstetrical visit. Patient reports swelling. Fetal movement: normal.    Her prenatal care is complicated by (and status) : Gest DM - GBS +       ROS -   Fetal Movement Good   Vaginal bleeding none   Cramping/Contractions none      /77   Wt 58.1 kg (128 lb)   LMP 2020   BMI 23.41 kg/m²     FHT:  154 BPM    Uterine Size: 33 cm   Presentations: cephalic   Pelvic Exam:     Dilation: 1cm    Effacement: 50%    Station:  -2                 Assessment    Diagnoses and all orders for this visit:    1. Encounter for supervision of normal first pregnancy in third trimester (Primary)    2. Diet controlled gestational diabetes mellitus (GDM) in third trimester    3. Anemia of mother in pregnancy, antepartum, third trimester    4. GBS (group B Streptococcus carrier), +RV culture, currently pregnant        1) Pregnancy at 37w0d  2) Fetal status reassuring   3) GBS status - Positive  4) Gest DM, diet controlled  Log reviewed, occassional elevated postprandial otherwise normal   Continue to monitor   5) Anemia, mild and on oral iron - stable         Plan    Labor warnings   [facility] BID        Gary Rosales MD   2021  12:06 EST

## 2021-03-02 ENCOUNTER — ROUTINE PRENATAL (OUTPATIENT)
Dept: OBSTETRICS AND GYNECOLOGY | Facility: CLINIC | Age: 21
End: 2021-03-02

## 2021-03-02 VITALS — WEIGHT: 128 LBS | DIASTOLIC BLOOD PRESSURE: 79 MMHG | SYSTOLIC BLOOD PRESSURE: 119 MMHG | BODY MASS INDEX: 23.41 KG/M2

## 2021-03-02 DIAGNOSIS — Z34.03 ENCOUNTER FOR SUPERVISION OF NORMAL FIRST PREGNANCY IN THIRD TRIMESTER: Primary | ICD-10-CM

## 2021-03-02 DIAGNOSIS — O99.013 ANEMIA OF MOTHER IN PREGNANCY, ANTEPARTUM, THIRD TRIMESTER: ICD-10-CM

## 2021-03-02 DIAGNOSIS — O99.820 GBS (GROUP B STREPTOCOCCUS CARRIER), +RV CULTURE, CURRENTLY PREGNANT: ICD-10-CM

## 2021-03-02 DIAGNOSIS — O24.410 DIET CONTROLLED GESTATIONAL DIABETES MELLITUS (GDM) IN THIRD TRIMESTER: ICD-10-CM

## 2021-03-02 LAB
GLUCOSE UR STRIP-MCNC: NEGATIVE MG/DL
PROT UR STRIP-MCNC: ABNORMAL MG/DL

## 2021-03-02 PROCEDURE — 99213 OFFICE O/P EST LOW 20 MIN: CPT | Performed by: OBSTETRICS & GYNECOLOGY

## 2021-03-02 NOTE — PROGRESS NOTES
Ob follow up    Anaya Conrad is a 20 y.o.  38w0d patient being seen today for her obstetrical visit. Patient reports occasional contractions. Fetal movement: normal.    Her prenatal care is complicated by (and status) : Gest DM, GBS +, and anemia       ROS -   Fetal Movement good   Vaginal bleeding none   Cramping/Contractions intermittent      /79   Wt 58.1 kg (128 lb)   LMP 2020   BMI 23.41 kg/m²     FHT:  134 BPM    Uterine Size: 34 cm   Presentations: cephalic   Pelvic Exam:     Dilation: 1cm    Effacement: 50%    Station:  -2                 Assessment    Diagnoses and all orders for this visit:    1. Encounter for supervision of normal first pregnancy in third trimester (Primary)    2. Diet controlled gestational diabetes mellitus (GDM) in third trimester    3. Anemia of mother in pregnancy, antepartum, third trimester    4. GBS (group B Streptococcus carrier), +RV culture, currently pregnant        1) Pregnancy at 38w0d  2) Fetal status reassuring   3) GBS status - Positive   4) Gest DM, diet controlled  No log today, but only on elevated at 170.   Otherwise well controlled  5) Anemia, mild and stable on oral iron     Consideration for 3/12/21 for induction     Plan    Labor warnings   [facility] BID        Gary Rosales MD   3/2/2021  13:22 EST

## 2021-03-07 ENCOUNTER — HOSPITAL ENCOUNTER (EMERGENCY)
Facility: HOSPITAL | Age: 21
Discharge: HOME | End: 2021-03-07
Attending: OBSTETRICS & GYNECOLOGY | Admitting: OBSTETRICS & GYNECOLOGY

## 2021-03-07 ENCOUNTER — ANESTHESIA EVENT (OUTPATIENT)
Dept: EMERGENCY DEPT | Facility: HOSPITAL | Age: 21
End: 2021-03-07

## 2021-03-07 ENCOUNTER — ANESTHESIA (OUTPATIENT)
Dept: EMERGENCY DEPT | Facility: HOSPITAL | Age: 21
End: 2021-03-07

## 2021-03-07 ENCOUNTER — HOSPITAL ENCOUNTER (INPATIENT)
Facility: HOSPITAL | Age: 21
LOS: 3 days | Discharge: HOME | End: 2021-03-10
Attending: OBSTETRICS & GYNECOLOGY | Admitting: OBSTETRICS & GYNECOLOGY

## 2021-03-07 VITALS
SYSTOLIC BLOOD PRESSURE: 125 MMHG | DIASTOLIC BLOOD PRESSURE: 79 MMHG | HEART RATE: 74 BPM | HEIGHT: 62 IN | WEIGHT: 128 LBS | TEMPERATURE: 99 F | RESPIRATION RATE: 18 BRPM | BODY MASS INDEX: 23.55 KG/M2 | OXYGEN SATURATION: 99 %

## 2021-03-07 PROBLEM — Z34.90 PREGNANCY: Status: ACTIVE | Noted: 2021-03-07

## 2021-03-07 LAB
ABO GROUP BLD: NORMAL
ALBUMIN SERPL-MCNC: 2.9 G/DL (ref 3.5–5.2)
ALBUMIN/GLOB SERPL: 1.1 G/DL
ALP SERPL-CCNC: 238 U/L (ref 39–117)
ALT SERPL W P-5'-P-CCNC: 10 U/L (ref 1–33)
ANION GAP SERPL CALCULATED.3IONS-SCNC: 9.7 MMOL/L (ref 5–15)
AST SERPL-CCNC: 15 U/L (ref 1–32)
BACTERIA UR QL AUTO: ABNORMAL /HPF
BASOPHILS # BLD AUTO: 0.03 10*3/MM3 (ref 0–0.2)
BASOPHILS NFR BLD AUTO: 0.3 % (ref 0–1.5)
BILIRUB SERPL-MCNC: 0.2 MG/DL (ref 0–1.2)
BILIRUB UR QL STRIP: NEGATIVE
BLD GP AB SCN SERPL QL: NEGATIVE
BUN SERPL-MCNC: 7 MG/DL (ref 6–20)
BUN/CREAT SERPL: 12.7 (ref 7–25)
CALCIUM SPEC-SCNC: 8.2 MG/DL (ref 8.6–10.5)
CHLORIDE SERPL-SCNC: 106 MMOL/L (ref 98–107)
CLARITY UR: ABNORMAL
CO2 SERPL-SCNC: 22.3 MMOL/L (ref 22–29)
COLOR UR: YELLOW
CREAT SERPL-MCNC: 0.55 MG/DL (ref 0.57–1)
CREAT UR-MCNC: 195.3 MG/DL
DEPRECATED RDW RBC AUTO: 44.8 FL (ref 37–54)
DEPRECATED RDW RBC AUTO: 46.1 FL (ref 37–54)
EOSINOPHIL # BLD AUTO: 0.02 10*3/MM3 (ref 0–0.4)
EOSINOPHIL NFR BLD AUTO: 0.2 % (ref 0.3–6.2)
ERYTHROCYTE [DISTWIDTH] IN BLOOD BY AUTOMATED COUNT: 14.7 % (ref 12.3–15.4)
ERYTHROCYTE [DISTWIDTH] IN BLOOD BY AUTOMATED COUNT: 14.8 % (ref 12.3–15.4)
GFR SERPL CREATININE-BSD FRML MDRD: 138 ML/MIN/1.73
GLOBULIN UR ELPH-MCNC: 2.6 GM/DL
GLUCOSE SERPL-MCNC: 107 MG/DL (ref 65–99)
GLUCOSE UR STRIP-MCNC: NEGATIVE MG/DL
HCT VFR BLD AUTO: 30.6 % (ref 34–46.6)
HCT VFR BLD AUTO: 32.7 % (ref 34–46.6)
HGB BLD-MCNC: 10.4 G/DL (ref 12–15.9)
HGB BLD-MCNC: 11.4 G/DL (ref 12–15.9)
HGB UR QL STRIP.AUTO: NEGATIVE
HYALINE CASTS UR QL AUTO: ABNORMAL /LPF
IMM GRANULOCYTES # BLD AUTO: 0.08 10*3/MM3 (ref 0–0.05)
IMM GRANULOCYTES NFR BLD AUTO: 0.7 % (ref 0–0.5)
KETONES UR QL STRIP: NEGATIVE
LEUKOCYTE ESTERASE UR QL STRIP.AUTO: ABNORMAL
LYMPHOCYTES # BLD AUTO: 1.55 10*3/MM3 (ref 0.7–3.1)
LYMPHOCYTES NFR BLD AUTO: 13.5 % (ref 19.6–45.3)
MCH RBC QN AUTO: 29 PG (ref 26.6–33)
MCH RBC QN AUTO: 29.6 PG (ref 26.6–33)
MCHC RBC AUTO-ENTMCNC: 34 G/DL (ref 31.5–35.7)
MCHC RBC AUTO-ENTMCNC: 34.9 G/DL (ref 31.5–35.7)
MCV RBC AUTO: 84.9 FL (ref 79–97)
MCV RBC AUTO: 85.2 FL (ref 79–97)
MONOCYTES # BLD AUTO: 0.74 10*3/MM3 (ref 0.1–0.9)
MONOCYTES NFR BLD AUTO: 6.4 % (ref 5–12)
NEUTROPHILS NFR BLD AUTO: 78.9 % (ref 42.7–76)
NEUTROPHILS NFR BLD AUTO: 9.08 10*3/MM3 (ref 1.7–7)
NITRITE UR QL STRIP: NEGATIVE
NRBC BLD AUTO-RTO: 0 /100 WBC (ref 0–0.2)
PH UR STRIP.AUTO: 6.5 [PH] (ref 5–8)
PLATELET # BLD AUTO: 116 10*3/MM3 (ref 140–450)
PLATELET # BLD AUTO: 127 10*3/MM3 (ref 140–450)
PMV BLD AUTO: 12.7 FL (ref 6–12)
PMV BLD AUTO: 12.9 FL (ref 6–12)
POTASSIUM SERPL-SCNC: 3.8 MMOL/L (ref 3.5–5.2)
PROT SERPL-MCNC: 5.5 G/DL (ref 6–8.5)
PROT UR QL STRIP: ABNORMAL
PROT UR-MCNC: 35 MG/DL
PROT/CREAT UR: 179.2 MG/G CREA (ref 0–200)
RBC # BLD AUTO: 3.59 10*6/MM3 (ref 3.77–5.28)
RBC # BLD AUTO: 3.85 10*6/MM3 (ref 3.77–5.28)
RBC # UR: ABNORMAL /HPF
REF LAB TEST METHOD: ABNORMAL
RH BLD: POSITIVE
SARS-COV-2 RNA RESP QL NAA+PROBE: NOT DETECTED
SODIUM SERPL-SCNC: 138 MMOL/L (ref 136–145)
SP GR UR STRIP: 1.02 (ref 1–1.03)
SQUAMOUS #/AREA URNS HPF: ABNORMAL /HPF
T&S EXPIRATION DATE: NORMAL
UROBILINOGEN UR QL STRIP: ABNORMAL
WBC # BLD AUTO: 11.5 10*3/MM3 (ref 3.4–10.8)
WBC # BLD AUTO: 13.27 10*3/MM3 (ref 3.4–10.8)
WBC UR QL AUTO: ABNORMAL /HPF

## 2021-03-07 PROCEDURE — 86850 RBC ANTIBODY SCREEN: CPT | Performed by: OBSTETRICS & GYNECOLOGY

## 2021-03-07 PROCEDURE — 85025 COMPLETE CBC W/AUTO DIFF WBC: CPT | Performed by: OBSTETRICS & GYNECOLOGY

## 2021-03-07 PROCEDURE — 80053 COMPREHEN METABOLIC PANEL: CPT | Performed by: OBSTETRICS & GYNECOLOGY

## 2021-03-07 PROCEDURE — 87086 URINE CULTURE/COLONY COUNT: CPT | Performed by: OBSTETRICS & GYNECOLOGY

## 2021-03-07 PROCEDURE — 86900 BLOOD TYPING SEROLOGIC ABO: CPT | Performed by: OBSTETRICS & GYNECOLOGY

## 2021-03-07 PROCEDURE — 81001 URINALYSIS AUTO W/SCOPE: CPT | Performed by: OBSTETRICS & GYNECOLOGY

## 2021-03-07 PROCEDURE — 84156 ASSAY OF PROTEIN URINE: CPT | Performed by: OBSTETRICS & GYNECOLOGY

## 2021-03-07 PROCEDURE — 59025 FETAL NON-STRESS TEST: CPT

## 2021-03-07 PROCEDURE — 82570 ASSAY OF URINE CREATININE: CPT | Performed by: OBSTETRICS & GYNECOLOGY

## 2021-03-07 PROCEDURE — 85027 COMPLETE CBC AUTOMATED: CPT | Performed by: OBSTETRICS & GYNECOLOGY

## 2021-03-07 PROCEDURE — 25010000002 PENICILLIN G POTASSIUM PER 600000 UNITS: Performed by: OBSTETRICS & GYNECOLOGY

## 2021-03-07 PROCEDURE — U0003 INFECTIOUS AGENT DETECTION BY NUCLEIC ACID (DNA OR RNA); SEVERE ACUTE RESPIRATORY SYNDROME CORONAVIRUS 2 (SARS-COV-2) (CORONAVIRUS DISEASE [COVID-19]), AMPLIFIED PROBE TECHNIQUE, MAKING USE OF HIGH THROUGHPUT TECHNOLOGIES AS DESCRIBED BY CMS-2020-01-R: HCPCS | Performed by: OBSTETRICS & GYNECOLOGY

## 2021-03-07 PROCEDURE — C1755 CATHETER, INTRASPINAL: HCPCS | Performed by: ANESTHESIOLOGY

## 2021-03-07 PROCEDURE — 99284 EMERGENCY DEPT VISIT MOD MDM: CPT | Performed by: OBSTETRICS & GYNECOLOGY

## 2021-03-07 PROCEDURE — 86901 BLOOD TYPING SEROLOGIC RH(D): CPT | Performed by: OBSTETRICS & GYNECOLOGY

## 2021-03-07 PROCEDURE — 99202 OFFICE O/P NEW SF 15 MIN: CPT | Performed by: OBSTETRICS & GYNECOLOGY

## 2021-03-07 RX ORDER — SODIUM CHLORIDE 0.9 % (FLUSH) 0.9 %
10 SYRINGE (ML) INJECTION AS NEEDED
Status: DISCONTINUED | OUTPATIENT
Start: 2021-03-07 | End: 2021-03-08

## 2021-03-07 RX ORDER — FAMOTIDINE 20 MG/1
20 TABLET, FILM COATED ORAL EVERY 12 HOURS PRN
Status: DISCONTINUED | OUTPATIENT
Start: 2021-03-07 | End: 2021-03-08

## 2021-03-07 RX ORDER — MAGNESIUM CARB/ALUMINUM HYDROX 105-160MG
30 TABLET,CHEWABLE ORAL ONCE
Status: DISCONTINUED | OUTPATIENT
Start: 2021-03-07 | End: 2021-03-08

## 2021-03-07 RX ORDER — SODIUM CHLORIDE, SODIUM LACTATE, POTASSIUM CHLORIDE, CALCIUM CHLORIDE 600; 310; 30; 20 MG/100ML; MG/100ML; MG/100ML; MG/100ML
125 INJECTION, SOLUTION INTRAVENOUS CONTINUOUS
Status: DISCONTINUED | OUTPATIENT
Start: 2021-03-07 | End: 2021-03-08

## 2021-03-07 RX ORDER — ONDANSETRON 2 MG/ML
4 INJECTION INTRAMUSCULAR; INTRAVENOUS EVERY 6 HOURS PRN
Status: DISCONTINUED | OUTPATIENT
Start: 2021-03-07 | End: 2021-03-08

## 2021-03-07 RX ORDER — TERBUTALINE SULFATE 1 MG/ML
0.25 INJECTION, SOLUTION SUBCUTANEOUS AS NEEDED
Status: DISCONTINUED | OUTPATIENT
Start: 2021-03-07 | End: 2021-03-08

## 2021-03-07 RX ORDER — SODIUM CHLORIDE 0.9 % (FLUSH) 0.9 %
3 SYRINGE (ML) INJECTION EVERY 12 HOURS SCHEDULED
Status: DISCONTINUED | OUTPATIENT
Start: 2021-03-07 | End: 2021-03-08

## 2021-03-07 RX ORDER — LIDOCAINE HYDROCHLORIDE AND EPINEPHRINE 15; 5 MG/ML; UG/ML
INJECTION, SOLUTION EPIDURAL AS NEEDED
Status: DISCONTINUED | OUTPATIENT
Start: 2021-03-07 | End: 2021-03-08 | Stop reason: SURG

## 2021-03-07 RX ORDER — ACETAMINOPHEN 325 MG/1
650 TABLET ORAL EVERY 4 HOURS PRN
Status: DISCONTINUED | OUTPATIENT
Start: 2021-03-07 | End: 2021-03-08

## 2021-03-07 RX ORDER — LIDOCAINE HYDROCHLORIDE 10 MG/ML
5 INJECTION, SOLUTION EPIDURAL; INFILTRATION; INTRACAUDAL; PERINEURAL AS NEEDED
Status: DISCONTINUED | OUTPATIENT
Start: 2021-03-07 | End: 2021-03-08

## 2021-03-07 RX ORDER — ONDANSETRON 4 MG/1
4 TABLET, FILM COATED ORAL EVERY 6 HOURS PRN
Status: DISCONTINUED | OUTPATIENT
Start: 2021-03-07 | End: 2021-03-08

## 2021-03-07 RX ORDER — EPHEDRINE SULFATE 50 MG/ML
5 INJECTION, SOLUTION INTRAVENOUS AS NEEDED
Status: DISCONTINUED | OUTPATIENT
Start: 2021-03-07 | End: 2021-03-08

## 2021-03-07 RX ORDER — BUTORPHANOL TARTRATE 1 MG/ML
1 INJECTION, SOLUTION INTRAMUSCULAR; INTRAVENOUS
Status: DISCONTINUED | OUTPATIENT
Start: 2021-03-07 | End: 2021-03-08

## 2021-03-07 RX ORDER — FAMOTIDINE 10 MG/ML
20 INJECTION, SOLUTION INTRAVENOUS ONCE AS NEEDED
Status: DISCONTINUED | OUTPATIENT
Start: 2021-03-07 | End: 2021-03-08

## 2021-03-07 RX ORDER — ONDANSETRON 2 MG/ML
4 INJECTION INTRAMUSCULAR; INTRAVENOUS ONCE AS NEEDED
Status: DISCONTINUED | OUTPATIENT
Start: 2021-03-07 | End: 2021-03-08

## 2021-03-07 RX ORDER — FAMOTIDINE 10 MG/ML
20 INJECTION, SOLUTION INTRAVENOUS EVERY 12 HOURS PRN
Status: DISCONTINUED | OUTPATIENT
Start: 2021-03-07 | End: 2021-03-08

## 2021-03-07 RX ORDER — DIPHENHYDRAMINE HYDROCHLORIDE 50 MG/ML
12.5 INJECTION INTRAMUSCULAR; INTRAVENOUS EVERY 8 HOURS PRN
Status: DISCONTINUED | OUTPATIENT
Start: 2021-03-07 | End: 2021-03-08

## 2021-03-07 RX ORDER — LIDOCAINE HYDROCHLORIDE AND EPINEPHRINE 15; 5 MG/ML; UG/ML
INJECTION, SOLUTION EPIDURAL AS NEEDED
Status: DISCONTINUED | OUTPATIENT
Start: 2021-03-07 | End: 2021-03-07

## 2021-03-07 RX ORDER — PENICILLIN G 3000000 [IU]/50ML
3 INJECTION, SOLUTION INTRAVENOUS EVERY 4 HOURS
Status: DISCONTINUED | OUTPATIENT
Start: 2021-03-08 | End: 2021-03-08

## 2021-03-07 RX ADMIN — SODIUM CHLORIDE, POTASSIUM CHLORIDE, SODIUM LACTATE AND CALCIUM CHLORIDE 125 ML/HR: 600; 310; 30; 20 INJECTION, SOLUTION INTRAVENOUS at 23:39

## 2021-03-07 RX ADMIN — PENICILLIN G POTASSIUM 5 MILLION UNITS: 5000000 INJECTION, POWDER, FOR SOLUTION INTRAMUSCULAR; INTRAVENOUS at 23:19

## 2021-03-07 RX ADMIN — SODIUM CHLORIDE, POTASSIUM CHLORIDE, SODIUM LACTATE AND CALCIUM CHLORIDE 1000 ML: 600; 310; 30; 20 INJECTION, SOLUTION INTRAVENOUS at 22:40

## 2021-03-07 RX ADMIN — LIDOCAINE HYDROCHLORIDE AND EPINEPHRINE 3 MG: 15; 5 INJECTION, SOLUTION EPIDURAL at 23:42

## 2021-03-07 RX ADMIN — FENTANYL CITRATE 10 ML/HR: 0.05 INJECTION, SOLUTION INTRAMUSCULAR; INTRAVENOUS at 23:47

## 2021-03-08 LAB — BACTERIA SPEC AEROBE CULT: NO GROWTH

## 2021-03-08 PROCEDURE — C1755 CATHETER, INTRASPINAL: HCPCS

## 2021-03-08 PROCEDURE — 59410 OBSTETRICAL CARE: CPT | Performed by: OBSTETRICS & GYNECOLOGY

## 2021-03-08 PROCEDURE — 25010000002 FENTANYL CITRATE (PF) 2500 MCG/50ML SOLUTION: Performed by: ANESTHESIOLOGY

## 2021-03-08 PROCEDURE — 88307 TISSUE EXAM BY PATHOLOGIST: CPT

## 2021-03-08 PROCEDURE — 25010000002 ROPIVACAINE PER 1 MG: Performed by: ANESTHESIOLOGY

## 2021-03-08 PROCEDURE — P9612 CATHETERIZE FOR URINE SPEC: HCPCS

## 2021-03-08 PROCEDURE — 0HQ9XZZ REPAIR PERINEUM SKIN, EXTERNAL APPROACH: ICD-10-PCS | Performed by: OBSTETRICS & GYNECOLOGY

## 2021-03-08 PROCEDURE — 25010000002 PENICILLIN G POTASSIUM PER 600000 UNITS: Performed by: OBSTETRICS & GYNECOLOGY

## 2021-03-08 RX ORDER — ONDANSETRON 4 MG/1
4 TABLET, FILM COATED ORAL EVERY 8 HOURS PRN
Status: DISCONTINUED | OUTPATIENT
Start: 2021-03-08 | End: 2021-03-10 | Stop reason: HOSPADM

## 2021-03-08 RX ORDER — METHYLERGONOVINE MALEATE 0.2 MG/ML
200 INJECTION INTRAVENOUS ONCE AS NEEDED
Status: DISCONTINUED | OUTPATIENT
Start: 2021-03-08 | End: 2021-03-08 | Stop reason: HOSPADM

## 2021-03-08 RX ORDER — DOCUSATE SODIUM 100 MG/1
100 CAPSULE, LIQUID FILLED ORAL 2 TIMES DAILY
Status: DISCONTINUED | OUTPATIENT
Start: 2021-03-08 | End: 2021-03-10 | Stop reason: HOSPADM

## 2021-03-08 RX ORDER — ERYTHROMYCIN 5 MG/G
OINTMENT OPHTHALMIC
Status: DISPENSED
Start: 2021-03-08 | End: 2021-03-08

## 2021-03-08 RX ORDER — IBUPROFEN 600 MG/1
600 TABLET ORAL EVERY 6 HOURS PRN
Status: DISCONTINUED | OUTPATIENT
Start: 2021-03-08 | End: 2021-03-10 | Stop reason: HOSPADM

## 2021-03-08 RX ORDER — BISACODYL 10 MG
10 SUPPOSITORY, RECTAL RECTAL DAILY PRN
Status: DISCONTINUED | OUTPATIENT
Start: 2021-03-09 | End: 2021-03-10 | Stop reason: HOSPADM

## 2021-03-08 RX ORDER — FERROUS SULFATE 325(65) MG
325 TABLET ORAL 2 TIMES DAILY WITH MEALS
Status: DISCONTINUED | OUTPATIENT
Start: 2021-03-08 | End: 2021-03-10 | Stop reason: HOSPADM

## 2021-03-08 RX ORDER — ACETAMINOPHEN 500 MG
1000 TABLET ORAL ONCE
Status: COMPLETED | OUTPATIENT
Start: 2021-03-08 | End: 2021-03-08

## 2021-03-08 RX ORDER — PRENATAL VIT/IRON FUM/FOLIC AC 27MG-0.8MG
1 TABLET ORAL DAILY
Status: DISCONTINUED | OUTPATIENT
Start: 2021-03-08 | End: 2021-03-10 | Stop reason: HOSPADM

## 2021-03-08 RX ORDER — OXYTOCIN-SODIUM CHLORIDE 0.9% IV SOLN 30 UNIT/500ML 30-0.9/5 UT/ML-%
125 SOLUTION INTRAVENOUS CONTINUOUS PRN
Status: COMPLETED | OUTPATIENT
Start: 2021-03-08 | End: 2021-03-08

## 2021-03-08 RX ORDER — MISOPROSTOL 200 UG/1
600 TABLET ORAL ONCE
Status: DISCONTINUED | OUTPATIENT
Start: 2021-03-08 | End: 2021-03-10 | Stop reason: HOSPADM

## 2021-03-08 RX ORDER — HYDROCORTISONE 25 MG/G
1 CREAM TOPICAL AS NEEDED
Status: DISCONTINUED | OUTPATIENT
Start: 2021-03-08 | End: 2021-03-10 | Stop reason: HOSPADM

## 2021-03-08 RX ORDER — OXYTOCIN-SODIUM CHLORIDE 0.9% IV SOLN 30 UNIT/500ML 30-0.9/5 UT/ML-%
250 SOLUTION INTRAVENOUS CONTINUOUS PRN
Status: DISPENSED | OUTPATIENT
Start: 2021-03-08 | End: 2021-03-08

## 2021-03-08 RX ORDER — SODIUM CHLORIDE 0.9 % (FLUSH) 0.9 %
1-10 SYRINGE (ML) INJECTION AS NEEDED
Status: DISCONTINUED | OUTPATIENT
Start: 2021-03-08 | End: 2021-03-10 | Stop reason: HOSPADM

## 2021-03-08 RX ORDER — MISOPROSTOL 200 UG/1
800 TABLET ORAL AS NEEDED
Status: DISCONTINUED | OUTPATIENT
Start: 2021-03-08 | End: 2021-03-08 | Stop reason: HOSPADM

## 2021-03-08 RX ORDER — PHYTONADIONE 1 MG/.5ML
INJECTION, EMULSION INTRAMUSCULAR; INTRAVENOUS; SUBCUTANEOUS
Status: DISPENSED
Start: 2021-03-08 | End: 2021-03-08

## 2021-03-08 RX ORDER — CARBOPROST TROMETHAMINE 250 UG/ML
250 INJECTION, SOLUTION INTRAMUSCULAR AS NEEDED
Status: DISCONTINUED | OUTPATIENT
Start: 2021-03-08 | End: 2021-03-08

## 2021-03-08 RX ORDER — HYDROCODONE BITARTRATE AND ACETAMINOPHEN 5; 325 MG/1; MG/1
1 TABLET ORAL EVERY 4 HOURS PRN
Status: DISCONTINUED | OUTPATIENT
Start: 2021-03-08 | End: 2021-03-10 | Stop reason: HOSPADM

## 2021-03-08 RX ORDER — DIPHENHYDRAMINE HCL 25 MG
25 CAPSULE ORAL NIGHTLY PRN
Status: DISCONTINUED | OUTPATIENT
Start: 2021-03-08 | End: 2021-03-10 | Stop reason: HOSPADM

## 2021-03-08 RX ORDER — OXYTOCIN-SODIUM CHLORIDE 0.9% IV SOLN 30 UNIT/500ML 30-0.9/5 UT/ML-%
999 SOLUTION INTRAVENOUS ONCE
Status: COMPLETED | OUTPATIENT
Start: 2021-03-08 | End: 2021-03-08

## 2021-03-08 RX ADMIN — FENTANYL CITRATE: 0.05 INJECTION, SOLUTION INTRAMUSCULAR; INTRAVENOUS at 05:37

## 2021-03-08 RX ADMIN — PENICILLIN G 3 MILLION UNITS: 3000000 INJECTION, SOLUTION INTRAVENOUS at 02:49

## 2021-03-08 RX ADMIN — Medication 1 TABLET: at 17:25

## 2021-03-08 RX ADMIN — HYDROCODONE BITARTRATE AND ACETAMINOPHEN 1 TABLET: 5; 325 TABLET ORAL at 17:25

## 2021-03-08 RX ADMIN — PENICILLIN G 3 MILLION UNITS: 3000000 INJECTION, SOLUTION INTRAVENOUS at 06:22

## 2021-03-08 RX ADMIN — IBUPROFEN 600 MG: 600 TABLET ORAL at 17:25

## 2021-03-08 RX ADMIN — SODIUM CHLORIDE, POTASSIUM CHLORIDE, SODIUM LACTATE AND CALCIUM CHLORIDE 125 ML/HR: 600; 310; 30; 20 INJECTION, SOLUTION INTRAVENOUS at 05:19

## 2021-03-08 RX ADMIN — FERROUS SULFATE TAB 325 MG (65 MG ELEMENTAL FE) 325 MG: 325 (65 FE) TAB at 17:25

## 2021-03-08 RX ADMIN — DOCUSATE SODIUM 100 MG: 100 CAPSULE ORAL at 20:06

## 2021-03-08 RX ADMIN — OXYTOCIN 125 ML/HR: 10 INJECTION, SOLUTION INTRAMUSCULAR; INTRAVENOUS at 10:43

## 2021-03-08 RX ADMIN — IBUPROFEN 600 MG: 600 TABLET ORAL at 11:17

## 2021-03-08 RX ADMIN — FAMOTIDINE 20 MG: 10 INJECTION INTRAVENOUS at 01:28

## 2021-03-08 RX ADMIN — OXYTOCIN 999 ML/HR: 10 INJECTION, SOLUTION INTRAMUSCULAR; INTRAVENOUS at 09:26

## 2021-03-08 RX ADMIN — ACETAMINOPHEN 1000 MG: 500 TABLET ORAL at 03:21

## 2021-03-08 NOTE — ANESTHESIA PROCEDURE NOTES
Labor Epidural      Patient reassessed immediately prior to procedure    Patient location during procedure: OB  Start Time: 3/7/2021 11:31 PM  Stop Time: 3/7/2021 11:42 PM  Performed By  Anesthesiologist: Edgar Forde MD  Preanesthetic Checklist  Completed: patient identified, IV checked, site marked, risks and benefits discussed, surgical consent, monitors and equipment checked, pre-op evaluation and timeout performed  Prep:  Pt Position:sitting  Sterile Tech:cap, gloves, mask and sterile barrier  Prep:chlorhexidine gluconate and isopropyl alcohol  Monitoring:blood pressure monitoring, continuous pulse oximetry and EKG  Epidural Block Procedure:  Approach:midline  Guidance:landmark technique  Location:L4-L5  Needle Type:Tuohy  Needle Gauge:17 G  Loss of Resistance Medium: saline  Loss of Resistance: 5cm  Cath Depth at skin:12 cm  Paresthesia: none  Aspiration:negative  Test Dose:negative  Med administered at 3/7/2021 11:42 PM  Number of Attempts: 1  Post Assessment:  Dressing:occlusive dressing applied and secured with tape  Pt Tolerance:patient tolerated the procedure well with no apparent complications  Complications:no

## 2021-03-08 NOTE — ANESTHESIA PREPROCEDURE EVALUATION
Anesthesia Evaluation     Patient summary reviewed and Nursing notes reviewed   no history of anesthetic complications:  NPO Solid Status: > 8 hours  NPO Liquid Status: > 8 hours           Airway   Mallampati: II  TM distance: >3 FB  Neck ROM: full  No difficulty expected  Dental - normal exam     Pulmonary    (-) rhonchi, decreased breath sounds, wheezes, not a smoker  Cardiovascular   Exercise tolerance: good (4-7 METS)    Rhythm: regular  Rate: normal    (+) hypertension,   (-) CAD, angina, VALADEZ, murmur      Neuro/Psych  (-) CVA  GI/Hepatic/Renal/Endo    (+)   diabetes mellitus gestational,   (-) no renal disease    Musculoskeletal     Abdominal     Abdomen: soft.   Substance History      OB/GYN    (+) Pregnant,         Other                        Anesthesia Plan    ASA 2     epidural   (38w5d)  intravenous induction     Anesthetic plan, all risks, benefits, and alternatives have been provided, discussed and informed consent has been obtained with: patient.

## 2021-03-09 ENCOUNTER — TELEPHONE (OUTPATIENT)
Dept: OBSTETRICS AND GYNECOLOGY | Facility: CLINIC | Age: 21
End: 2021-03-09

## 2021-03-09 LAB
BASOPHILS # BLD AUTO: 0.06 10*3/MM3 (ref 0–0.2)
BASOPHILS NFR BLD AUTO: 0.4 % (ref 0–1.5)
DEPRECATED RDW RBC AUTO: 46.3 FL (ref 37–54)
EOSINOPHIL # BLD AUTO: 0.12 10*3/MM3 (ref 0–0.4)
EOSINOPHIL NFR BLD AUTO: 0.7 % (ref 0.3–6.2)
ERYTHROCYTE [DISTWIDTH] IN BLOOD BY AUTOMATED COUNT: 14.9 % (ref 12.3–15.4)
HCT VFR BLD AUTO: 27 % (ref 34–46.6)
HGB BLD-MCNC: 9.1 G/DL (ref 12–15.9)
IMM GRANULOCYTES # BLD AUTO: 0.26 10*3/MM3 (ref 0–0.05)
IMM GRANULOCYTES NFR BLD AUTO: 1.5 % (ref 0–0.5)
LYMPHOCYTES # BLD AUTO: 2.36 10*3/MM3 (ref 0.7–3.1)
LYMPHOCYTES NFR BLD AUTO: 14 % (ref 19.6–45.3)
MCH RBC QN AUTO: 28.7 PG (ref 26.6–33)
MCHC RBC AUTO-ENTMCNC: 33.7 G/DL (ref 31.5–35.7)
MCV RBC AUTO: 85.2 FL (ref 79–97)
MONOCYTES # BLD AUTO: 0.93 10*3/MM3 (ref 0.1–0.9)
MONOCYTES NFR BLD AUTO: 5.5 % (ref 5–12)
NEUTROPHILS NFR BLD AUTO: 13.12 10*3/MM3 (ref 1.7–7)
NEUTROPHILS NFR BLD AUTO: 77.9 % (ref 42.7–76)
NRBC BLD AUTO-RTO: 0 /100 WBC (ref 0–0.2)
PLATELET # BLD AUTO: 134 10*3/MM3 (ref 140–450)
PMV BLD AUTO: 13 FL (ref 6–12)
RBC # BLD AUTO: 3.17 10*6/MM3 (ref 3.77–5.28)
WBC # BLD AUTO: 16.85 10*3/MM3 (ref 3.4–10.8)

## 2021-03-09 PROCEDURE — 85025 COMPLETE CBC W/AUTO DIFF WBC: CPT | Performed by: OBSTETRICS & GYNECOLOGY

## 2021-03-09 RX ADMIN — DOCUSATE SODIUM 100 MG: 100 CAPSULE ORAL at 09:32

## 2021-03-09 RX ADMIN — Medication 1 TABLET: at 14:51

## 2021-03-09 RX ADMIN — FERROUS SULFATE TAB 325 MG (65 MG ELEMENTAL FE) 325 MG: 325 (65 FE) TAB at 18:37

## 2021-03-09 RX ADMIN — HYDROCODONE BITARTRATE AND ACETAMINOPHEN 1 TABLET: 5; 325 TABLET ORAL at 20:19

## 2021-03-09 RX ADMIN — FERROUS SULFATE TAB 325 MG (65 MG ELEMENTAL FE) 325 MG: 325 (65 FE) TAB at 09:32

## 2021-03-09 RX ADMIN — HYDROCODONE BITARTRATE AND ACETAMINOPHEN 1 TABLET: 5; 325 TABLET ORAL at 09:32

## 2021-03-09 RX ADMIN — HYDROCODONE BITARTRATE AND ACETAMINOPHEN 1 TABLET: 5; 325 TABLET ORAL at 00:00

## 2021-03-09 RX ADMIN — IBUPROFEN 600 MG: 600 TABLET ORAL at 00:00

## 2021-03-09 RX ADMIN — DOCUSATE SODIUM 100 MG: 100 CAPSULE ORAL at 20:19

## 2021-03-09 RX ADMIN — IBUPROFEN 600 MG: 600 TABLET ORAL at 16:16

## 2021-03-09 RX ADMIN — IBUPROFEN 600 MG: 600 TABLET ORAL at 09:33

## 2021-03-09 RX ADMIN — MAGNESIUM HYDROXIDE 10 ML: 2400 SUSPENSION ORAL at 18:40

## 2021-03-09 NOTE — TELEPHONE ENCOUNTER
----- Message from Gary Rosales MD sent at 3/9/2021  7:22 AM EST -----  Oly, normal urine culture. Please let her know. Thanks Dr. Rosales

## 2021-03-10 VITALS
HEIGHT: 62 IN | SYSTOLIC BLOOD PRESSURE: 150 MMHG | HEART RATE: 57 BPM | TEMPERATURE: 98.1 F | OXYGEN SATURATION: 99 % | WEIGHT: 127.4 LBS | RESPIRATION RATE: 16 BRPM | BODY MASS INDEX: 23.45 KG/M2 | DIASTOLIC BLOOD PRESSURE: 78 MMHG

## 2021-03-10 PROCEDURE — 90471 IMMUNIZATION ADMIN: CPT | Performed by: OBSTETRICS & GYNECOLOGY

## 2021-03-10 PROCEDURE — 25010000002 MEASLES, MUMPS & RUBELLA VAC RECONSTITUTED SOLUTION: Performed by: OBSTETRICS & GYNECOLOGY

## 2021-03-10 PROCEDURE — 90707 MMR VACCINE SC: CPT | Performed by: OBSTETRICS & GYNECOLOGY

## 2021-03-10 RX ORDER — HYDROCODONE BITARTRATE AND ACETAMINOPHEN 5; 325 MG/1; MG/1
1 TABLET ORAL EVERY 4 HOURS PRN
Qty: 8 TABLET | Refills: 0 | Status: SHIPPED | OUTPATIENT
Start: 2021-03-10 | End: 2021-03-15

## 2021-03-10 RX ADMIN — DOCUSATE SODIUM 100 MG: 100 CAPSULE ORAL at 08:38

## 2021-03-10 RX ADMIN — Medication 1 TABLET: at 08:38

## 2021-03-10 RX ADMIN — MEASLES, MUMPS, AND RUBELLA VIRUS VACCINE LIVE 0.5 ML: 1000; 12500; 1000 INJECTION, POWDER, LYOPHILIZED, FOR SUSPENSION SUBCUTANEOUS at 09:49

## 2021-03-10 RX ADMIN — FERROUS SULFATE TAB 325 MG (65 MG ELEMENTAL FE) 325 MG: 325 (65 FE) TAB at 08:38

## 2021-03-10 RX ADMIN — IBUPROFEN 600 MG: 600 TABLET ORAL at 06:46

## 2021-03-10 RX ADMIN — HYDROCODONE BITARTRATE AND ACETAMINOPHEN 1 TABLET: 5; 325 TABLET ORAL at 06:46

## 2021-04-02 ENCOUNTER — TELEPHONE (OUTPATIENT)
Dept: OBSTETRICS AND GYNECOLOGY | Facility: CLINIC | Age: 21
End: 2021-04-02

## 2021-04-02 ENCOUNTER — POSTPARTUM VISIT (OUTPATIENT)
Dept: OBSTETRICS AND GYNECOLOGY | Facility: CLINIC | Age: 21
End: 2021-04-02

## 2021-04-02 VITALS
WEIGHT: 111 LBS | DIASTOLIC BLOOD PRESSURE: 80 MMHG | SYSTOLIC BLOOD PRESSURE: 123 MMHG | HEIGHT: 62 IN | BODY MASS INDEX: 20.43 KG/M2

## 2021-04-02 PROCEDURE — 99214 OFFICE O/P EST MOD 30 MIN: CPT | Performed by: STUDENT IN AN ORGANIZED HEALTH CARE EDUCATION/TRAINING PROGRAM

## 2021-04-02 RX ORDER — SERTRALINE HYDROCHLORIDE 25 MG/1
25 TABLET, FILM COATED ORAL DAILY
Qty: 30 TABLET | Refills: 1 | Status: SHIPPED | OUTPATIENT
Start: 2021-04-02 | End: 2021-04-26 | Stop reason: SDUPTHER

## 2021-04-02 RX ORDER — SERTRALINE HYDROCHLORIDE 25 MG/1
25 TABLET, FILM COATED ORAL DAILY
Qty: 30 TABLET | Refills: 1 | Status: SHIPPED | OUTPATIENT
Start: 2021-04-02 | End: 2021-04-02

## 2021-04-02 NOTE — TELEPHONE ENCOUNTER
Patient called upset asked to speak to her doctor about postpartum depression. Patient stated that she hadn't felt herself the last few days and felt off. I asked patient if she felt like harming herself or her baby and patient stated yes. Patient was asked to stay on the line and was directed to answer call from Dr. Bentley since her doctor was out today.

## 2021-04-02 NOTE — TELEPHONE ENCOUNTER
"Called patient after she called into the office about postpartum depression and stated that she has had periods where she has wanted to harm herself. She states that she has been going through ups and downs and does not feel like herself. She reports that She has felt like harming herself as recently as last night but does not feel like harming herself currently. She denied a plan on how she wanted to her hurt herself and reports \"I know that I couldn't really harm myself because of my baby.\" The patient was asked if she could come in for an appointment today so resources and medication could be initiated and she stated she could come in to the [place] office. Address was provided to the patient. She was advised to call back if she could not come into the office. She is at home with her boyfriend and baby. She stated she will come in for a visit today. All questions answered.    Kellie Bentley MD   4/2/2021  16:14 EDT    "
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
## 2021-04-09 ENCOUNTER — POSTPARTUM VISIT (OUTPATIENT)
Dept: OBSTETRICS AND GYNECOLOGY | Facility: CLINIC | Age: 21
End: 2021-04-09

## 2021-04-09 VITALS
HEART RATE: 72 BPM | BODY MASS INDEX: 20.24 KG/M2 | DIASTOLIC BLOOD PRESSURE: 76 MMHG | WEIGHT: 110 LBS | HEIGHT: 62 IN | SYSTOLIC BLOOD PRESSURE: 125 MMHG

## 2021-04-09 PROCEDURE — 99212 OFFICE O/P EST SF 10 MIN: CPT | Performed by: OBSTETRICS & GYNECOLOGY

## 2021-04-09 NOTE — PROGRESS NOTES
"Here for Postpartum depression follow up     HPI    20 y.o.  - Delivered 2021, now at 4 weeks postpartum for follow up. Seen in office last week with severe postpartum depression and episode of suicidal ideation - Started on zoloft and asked to follow up.     Feels the zoloft has calmed her down very well. Does feel she is more tired as a result of the medication     Review of Systems   Respiratory: Negative for chest tightness.    Gastrointestinal: Negative for abdominal pain.   Psychiatric/Behavioral: Negative for self-injury and suicidal ideas. The patient is not nervous/anxious.        /76   Pulse 72   Ht 157.5 cm (62\")   Wt 49.9 kg (110 lb)   Breastfeeding No   BMI 20.12 kg/m²     Physical Exam  Constitutional:       General: She is not in acute distress.     Appearance: Normal appearance. She is well-developed and normal weight.   Neck:      Thyroid: No thyromegaly.   Pulmonary:      Effort: No respiratory distress.   Abdominal:      General: Abdomen is flat. There is no distension.      Palpations: Abdomen is soft.      Tenderness: There is no abdominal tenderness.   Musculoskeletal:      Right lower leg: No edema.      Left lower leg: No edema.   Neurological:      Mental Status: She is alert and oriented to person, place, and time.   Skin:     General: Skin is warm and dry.   Psychiatric:         Mood and Affect: Mood normal.         Behavior: Behavior normal.         Thought Content: Thought content normal.         Judgment: Judgment normal.   Vitals reviewed.            Assessment/plan   Diagnoses and all orders for this visit:    1. Postpartum depression (Primary)    Doing well on zoloft   Encouraged to continue for now   Expectations and warning signs reviewed.     Gary Rosales MD   2021  14:06 EDT    "

## 2021-04-16 ENCOUNTER — BULK ORDERING (OUTPATIENT)
Dept: CASE MANAGEMENT | Facility: OTHER | Age: 21
End: 2021-04-16

## 2021-04-16 DIAGNOSIS — Z23 IMMUNIZATION DUE: ICD-10-CM

## 2021-04-26 ENCOUNTER — POSTPARTUM VISIT (OUTPATIENT)
Dept: OBSTETRICS AND GYNECOLOGY | Facility: CLINIC | Age: 21
End: 2021-04-26

## 2021-04-26 VITALS
HEART RATE: 55 BPM | DIASTOLIC BLOOD PRESSURE: 82 MMHG | SYSTOLIC BLOOD PRESSURE: 122 MMHG | BODY MASS INDEX: 20.43 KG/M2 | HEIGHT: 62 IN | WEIGHT: 111 LBS

## 2021-04-26 DIAGNOSIS — Z30.011 OCP (ORAL CONTRACEPTIVE PILLS) INITIATION: ICD-10-CM

## 2021-04-26 DIAGNOSIS — Z11.3 SCREEN FOR STD (SEXUALLY TRANSMITTED DISEASE): ICD-10-CM

## 2021-04-26 PROCEDURE — 0503F POSTPARTUM CARE VISIT: CPT | Performed by: OBSTETRICS & GYNECOLOGY

## 2021-04-26 RX ORDER — SERTRALINE HYDROCHLORIDE 25 MG/1
25 TABLET, FILM COATED ORAL DAILY
Qty: 30 TABLET | Refills: 3 | Status: SHIPPED | OUTPATIENT
Start: 2021-04-26 | End: 2021-12-27

## 2021-04-26 RX ORDER — LEVONORGESTREL AND ETHINYL ESTRADIOL 0.15-0.03
1 KIT ORAL DAILY
Qty: 84 TABLET | Refills: 3 | Status: SHIPPED | OUTPATIENT
Start: 2021-04-26 | End: 2021-09-27

## 2021-04-26 NOTE — PROGRESS NOTES
"Here for Postpartum Check     HPI    20 y.o.  - Delivered 2021, now at 6 weeks postpartum for follow up.   Complications of pregnancy/delivery/postpartum : GDM  Breastfeeding/Bottlefeeding : Bottle  Baby Blues:  Yes, but better with Zoloft  Contraception : request OCP's.   Last pap : 2020 NL HPV neg  Complaints: denies    Review of Systems   Constitutional: Negative for chills and fever.   Gastrointestinal: Negative for abdominal pain.   Genitourinary: Negative for dysuria, menstrual problem, pelvic pain, vaginal bleeding and vaginal discharge.   All other systems reviewed and are negative.      /82   Pulse 55   Ht 157.5 cm (62\")   Wt 50.3 kg (111 lb)   LMP 04/15/2021 (Exact Date)   Breastfeeding No   BMI 20.30 kg/m²     Physical Exam  Constitutional:       General: She is not in acute distress.     Appearance: She is well-developed and normal weight.   Genitourinary:      Pelvic exam was performed with patient supine.      Vulva, vagina, uterus, right adnexa and left adnexa normal.      No vulval lesion or Bartholin's cyst noted.      No inguinal adenopathy present in the right or left side.     No vaginal discharge or bleeding.      No cervical motion tenderness or friability.      Uterus is not enlarged or tender.      No uterine mass detected.     Uterus is anteverted and regular.      No right or left adnexal mass present.      Right adnexa not tender or full.      Left adnexa not tender or full.   HENT:      Head: Normocephalic and atraumatic.      Nose: Nose normal.   Eyes:      Conjunctiva/sclera: Conjunctivae normal.      Pupils: Pupils are equal, round, and reactive to light.   Neck:      Thyroid: No thyromegaly.   Cardiovascular:      Rate and Rhythm: Normal rate and regular rhythm.      Heart sounds: Normal heart sounds. No murmur heard.     Pulmonary:      Effort: Pulmonary effort is normal. No respiratory distress.      Breath sounds: Normal breath sounds.   Chest:      " Breasts:         Right: No inverted nipple, mass or nipple discharge.         Left: No inverted nipple, mass or nipple discharge.   Abdominal:      General: Abdomen is flat. There is no distension.      Palpations: Abdomen is soft.      Tenderness: There is no abdominal tenderness.   Musculoskeletal:         General: No deformity. Normal range of motion.      Cervical back: Normal range of motion and neck supple.      Right lower leg: No edema.      Left lower leg: No edema.   Lymphadenopathy:      Lower Body: No right inguinal adenopathy. No left inguinal adenopathy.   Neurological:      Mental Status: She is alert and oriented to person, place, and time.   Skin:     General: Skin is warm and dry.      Findings: No erythema.   Psychiatric:         Behavior: Behavior normal.         Thought Content: Thought content normal.         Judgment: Judgment normal.   Vitals reviewed. Exam conducted with a chaperone present.            Assessment/plan   Diagnoses and all orders for this visit:    1. Postpartum state (Primary)    2. Screen for STD (sexually transmitted disease)  -     Chlamydia trachomatis, Neisseria gonorrhoeae, Trichomonas vaginalis, PCR - Swab, Vagina    3. Postpartum depression    4. OCP (oral contraceptive pills) initiation    Other orders  -     levonorgestrel-ethinyl estradiol (NORDETTE) 0.15-30 MG-MCG per tablet; Take 1 tablet by mouth Daily.  Dispense: 84 tablet; Refill: 3  -     sertraline (Zoloft) 25 MG tablet; Take 1 tablet by mouth Daily. For 2 weeks then increase to 2 tablets by mouth daily.  Dispense: 30 tablet; Refill: 3      1) postpartum   Released from restrictions   Recommendation : 150 min/week of moderate intensity aerobic activity     2) Postpartum depression   On zoloft with good control   Rx refilled.   Expectations reviewed.     3) OCP start   How to start   Common side effects   Life threatening complications   Efficiency reviewed.     4) STD screen   Annually done     Gary BROOKS  MD Bobby   4/26/2021  14:55 EDT

## 2021-04-29 ENCOUNTER — TELEPHONE (OUTPATIENT)
Dept: OBSTETRICS AND GYNECOLOGY | Facility: CLINIC | Age: 21
End: 2021-04-29

## 2021-04-29 LAB
C TRACH RRNA SPEC QL NAA+PROBE: NEGATIVE
N GONORRHOEA RRNA SPEC QL NAA+PROBE: NEGATIVE
T VAGINALIS DNA SPEC QL NAA+PROBE: NEGATIVE

## 2021-09-27 ENCOUNTER — TELEPHONE (OUTPATIENT)
Dept: OBSTETRICS AND GYNECOLOGY | Facility: CLINIC | Age: 21
End: 2021-09-27

## 2021-09-27 RX ORDER — NORETHINDRONE ACETATE AND ETHINYL ESTRADIOL AND FERROUS FUMARATE 1MG-20(24)
1 KIT ORAL DAILY
Qty: 84 TABLET | Refills: 2 | Status: SHIPPED | OUTPATIENT
Start: 2021-09-27 | End: 2021-11-08 | Stop reason: SDUPTHER

## 2021-09-27 NOTE — TELEPHONE ENCOUNTER
Patient on birth control for 6 months. She has been starting cycle before she takes placebo and it lasts 2 weeks. Would like to try a different pill. Pt Ph [phone]

## 2021-11-08 ENCOUNTER — TELEPHONE (OUTPATIENT)
Dept: OBSTETRICS AND GYNECOLOGY | Facility: CLINIC | Age: 21
End: 2021-11-08

## 2021-11-08 RX ORDER — NORETHINDRONE ACETATE AND ETHINYL ESTRADIOL AND FERROUS FUMARATE 1MG-20(24)
1 KIT ORAL DAILY
Qty: 84 TABLET | Refills: 1 | Status: SHIPPED | OUTPATIENT
Start: 2021-11-08 | End: 2022-05-12 | Stop reason: SDUPTHER

## 2021-11-08 NOTE — TELEPHONE ENCOUNTER
Good afternoon,  Patient is calling to see if provider can transfer prescription to different pharmacy.  Rx- norethindrone-ethinyl estradiol-ferrous fumarate  Patient stated that previous pharmacy is always closed when she tries to  her medicine, due to store reasoning.  Patient's new pharmacy has been updated.      Please advise,  Thank you.      Pharmacy confirmed- [id number] - [place], [place] - [address] - [phone] [facility] [phone] FX   no

## 2021-12-03 ENCOUNTER — APPOINTMENT (OUTPATIENT)
Dept: GENERAL RADIOLOGY | Facility: HOSPITAL | Age: 21
End: 2021-12-03

## 2021-12-03 ENCOUNTER — HOSPITAL ENCOUNTER (EMERGENCY)
Facility: HOSPITAL | Age: 21
Discharge: HOME | End: 2021-12-03
Attending: EMERGENCY MEDICINE | Admitting: EMERGENCY MEDICINE

## 2021-12-03 ENCOUNTER — APPOINTMENT (OUTPATIENT)
Dept: CT IMAGING | Facility: HOSPITAL | Age: 21
End: 2021-12-03

## 2021-12-03 VITALS
SYSTOLIC BLOOD PRESSURE: 144 MMHG | TEMPERATURE: 98.6 F | DIASTOLIC BLOOD PRESSURE: 99 MMHG | HEART RATE: 102 BPM | OXYGEN SATURATION: 99 % | BODY MASS INDEX: 20.24 KG/M2 | RESPIRATION RATE: 16 BRPM | WEIGHT: 110 LBS | HEIGHT: 62 IN
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
## 2021-12-03 DIAGNOSIS — S33.5XXA SPRAIN OF LOW BACK, INITIAL ENCOUNTER: ICD-10-CM

## 2021-12-03 DIAGNOSIS — S46.911A STRAIN OF RIGHT SHOULDER, INITIAL ENCOUNTER: ICD-10-CM

## 2021-12-03 DIAGNOSIS — S60.221A CONTUSION OF RIGHT HAND, INITIAL ENCOUNTER: ICD-10-CM

## 2021-12-03 DIAGNOSIS — S80.02XA CONTUSION OF LEFT KNEE, INITIAL ENCOUNTER: ICD-10-CM

## 2021-12-03 PROCEDURE — 73130 X-RAY EXAM OF HAND: CPT

## 2021-12-03 PROCEDURE — 73030 X-RAY EXAM OF SHOULDER: CPT

## 2021-12-03 PROCEDURE — 70450 CT HEAD/BRAIN W/O DYE: CPT

## 2021-12-03 PROCEDURE — 72110 X-RAY EXAM L-2 SPINE 4/>VWS: CPT

## 2021-12-03 PROCEDURE — 73560 X-RAY EXAM OF KNEE 1 OR 2: CPT

## 2021-12-03 PROCEDURE — 99283 EMERGENCY DEPT VISIT LOW MDM: CPT

## 2021-12-03 PROCEDURE — 72072 X-RAY EXAM THORAC SPINE 3VWS: CPT

## 2021-12-03 PROCEDURE — 72050 X-RAY EXAM NECK SPINE 4/5VWS: CPT

## 2021-12-03 RX ORDER — HYDROCODONE BITARTRATE AND ACETAMINOPHEN 7.5; 325 MG/1; MG/1
1 TABLET ORAL ONCE
Status: COMPLETED | OUTPATIENT
Start: 2021-12-03 | End: 2021-12-03

## 2021-12-03 RX ADMIN — HYDROCODONE BITARTRATE AND ACETAMINOPHEN 1 TABLET: 7.5; 325 TABLET ORAL at 10:23
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
## 2021-12-03 NOTE — DISCHARGE INSTRUCTIONS
Rest through the weekend.  Ice to knees, hand and shoulder through the weekend.  Use Tylenol or Motrin for pain.  Follow-up your doctor if not better by Monday or return if worse.

## 2021-12-23 ENCOUNTER — TELEPHONE (OUTPATIENT)
Dept: OBSTETRICS AND GYNECOLOGY | Facility: CLINIC | Age: 21
End: 2021-12-23

## 2021-12-23 NOTE — TELEPHONE ENCOUNTER
Patient called she states that she has a lump on her vaginal area on the outside where the entrance is as she describes. And she said that it is painful and she noticed it right after intercourse. She said its just one bump its hard and more like a knot. It isn't itching and no other bumps present. She said she was ok to wait till next week but both you and Gabriella are out after Monday she wanted to see if she can be worked in or is she ok to wait longer?

## 2021-12-23 NOTE — TELEPHONE ENCOUNTER
Camila,     Work with Oly to see her for problem visit on Monday. I do not have any cases, so can come in at Noon or early if that would help with the schedule.     Thanks   Dr. Rosales

## 2021-12-27 ENCOUNTER — OFFICE VISIT (OUTPATIENT)
Dept: OBSTETRICS AND GYNECOLOGY | Facility: CLINIC | Age: 21
End: 2021-12-27

## 2021-12-27 VITALS
SYSTOLIC BLOOD PRESSURE: 122 MMHG | HEIGHT: 62 IN | BODY MASS INDEX: 20.43 KG/M2 | WEIGHT: 111 LBS | DIASTOLIC BLOOD PRESSURE: 86 MMHG | HEART RATE: 51 BPM

## 2021-12-27 DIAGNOSIS — L73.2 VULVAL HIDRADENITIS SUPPURATIVA: Primary | ICD-10-CM

## 2021-12-27 PROCEDURE — 99213 OFFICE O/P EST LOW 20 MIN: CPT | Performed by: OBSTETRICS & GYNECOLOGY

## 2021-12-27 RX ORDER — DOXYCYCLINE 100 MG/1
100 CAPSULE ORAL 2 TIMES DAILY
Qty: 20 CAPSULE | Refills: 0 | Status: SHIPPED | OUTPATIENT
Start: 2021-12-27 | End: 2022-01-06

## 2021-12-27 NOTE — PROGRESS NOTES
"Subjective    is a 21 y.o. female with possible Bartholins cyst on the left. Pt thought she cuts herself shaving and now the cut has gotten swollen and infected.     Chief Complaint   Patient presents with   • Bartholin's Cyst        HPI     21 y.o.     Vulvar lesion   Started bothering her around last Thursday   Has improved on own   Her Mom \"popped it\"   And it was better   Nothing like this in the past       Review of Systems   Constitutional: Negative for fever.   Gastrointestinal: Negative for abdominal pain.   Genitourinary: Positive for pelvic pain. Negative for dysuria and vaginal discharge.        Objective   /86   Pulse 51   Ht 157.5 cm (62\")   Wt 50.3 kg (111 lb)   LMP 2021 (Exact Date)   Breastfeeding No   BMI 20.30 kg/m²   Physical Exam  Constitutional:       General: She is not in acute distress.     Appearance: Normal appearance. She is well-developed and normal weight.   Genitourinary:      Right Labia: No lesions, skin changes or Bartholin's cyst.     Left Labia: lesions and skin changes.      Left Labia: No Bartholin's cyst.           No inguinal adenopathy present in the right or left side.  Neck:      Thyroid: No thyromegaly.   Pulmonary:      Effort: No respiratory distress.   Abdominal:      General: Abdomen is flat. There is no distension.      Palpations: Abdomen is soft.      Tenderness: There is no abdominal tenderness.   Musculoskeletal:      Right lower leg: No edema.      Left lower leg: No edema.   Lymphadenopathy:      Lower Body: No right inguinal adenopathy. No left inguinal adenopathy.   Neurological:      Mental Status: She is alert and oriented to person, place, and time.   Skin:     General: Skin is warm and dry.   Psychiatric:         Behavior: Behavior normal.         Thought Content: Thought content normal.         Judgment: Judgment normal.   Vitals reviewed.          Assessment/Plan   Diagnoses and all orders for this visit:    1. Vulval hidradenitis " suppurativa (Primary)    Other orders  -     doxycycline (MONODOX) 100 MG capsule; Take 1 capsule by mouth 2 (Two) Times a Day for 10 days.  Dispense: 20 capsule; Refill: 0    New vulvar lesion   Location and nature make more likely to be hidradenitis type of lesion or other skin abscess than a bartholin abscess.   Treat with doxycyline and anticipate resolution   Warm compress prn   Follow up if does not resolve.     Gary Rosales MD   12/27/2021  13:18 EST

## 2022-01-03 ENCOUNTER — TELEPHONE (OUTPATIENT)
Dept: OBSTETRICS AND GYNECOLOGY | Facility: CLINIC | Age: 22
End: 2022-01-03

## 2022-05-12 ENCOUNTER — TELEPHONE (OUTPATIENT)
Dept: OBSTETRICS AND GYNECOLOGY | Facility: CLINIC | Age: 22
End: 2022-05-12

## 2022-05-12 RX ORDER — NORETHINDRONE ACETATE AND ETHINYL ESTRADIOL AND FERROUS FUMARATE 1MG-20(24)
1 KIT ORAL DAILY
Qty: 28 TABLET | Refills: 0 | Status: SHIPPED | OUTPATIENT
Start: 2022-05-12 | End: 2022-05-23 | Stop reason: SDUPTHER

## 2022-05-12 NOTE — TELEPHONE ENCOUNTER
Good afternoon,     Patient is calling to ask provider for a refill on Rx - norethindrone-ethinyl estradiol-ferrous fumarate (LOESTIN 24 FE) 1-20 MG-MCG(24) per tablet [[id number]]     If possible.     Please advise,   Thank you     Pharmacy confirmed - [id number] - [place], [place] - [address] AT [address] - [phone]  - [phone] FX

## 2022-05-12 NOTE — TELEPHONE ENCOUNTER
Janett,     Seen for postpartum last April. So she is Due for her annual. Only one month sent in, future refills will need to be awaiting annual or after annual.     Thanks,   Dr. Rosales

## 2022-05-12 NOTE — TELEPHONE ENCOUNTER
Patient aware, and has scheduled her annual for July.    Thank you    The patient is a 89y Female complaining of abdominal pain.

## 2022-05-23 ENCOUNTER — TELEPHONE (OUTPATIENT)
Dept: OBSTETRICS AND GYNECOLOGY | Facility: CLINIC | Age: 22
End: 2022-05-23

## 2022-05-23 RX ORDER — NORETHINDRONE ACETATE AND ETHINYL ESTRADIOL AND FERROUS FUMARATE 1MG-20(24)
1 KIT ORAL DAILY
Qty: 28 TABLET | Refills: 0 | Status: SHIPPED | OUTPATIENT
Start: 2022-05-23 | End: 2022-06-17 | Stop reason: SDUPTHER

## 2022-05-23 NOTE — TELEPHONE ENCOUNTER
Nhung,     Pharmacy request to refill OCP  I sent in Rx for one month. Needs to set up for annual.     Thanks,   Dr. Rosales

## 2022-06-17 RX ORDER — NORETHINDRONE ACETATE AND ETHINYL ESTRADIOL AND FERROUS FUMARATE 1MG-20(24)
1 KIT ORAL DAILY
Qty: 28 TABLET | Refills: 0 | Status: SHIPPED | OUTPATIENT
Start: 2022-06-17 | End: 2022-07-15 | Stop reason: SDUPTHER

## 2022-06-17 RX ORDER — NORETHINDRONE ACETATE AND ETHINYL ESTRADIOL AND FERROUS FUMARATE 1MG-20(24)
KIT ORAL
Qty: 84 TABLET | OUTPATIENT
Start: 2022-06-17

## 2022-06-17 NOTE — TELEPHONE ENCOUNTER
Caller: Anaya Conrad    Relationship: Self    Best call back number:     Requested Prescriptions:   Requested Prescriptions     Pending Prescriptions Disp Refills   • norethindrone-ethinyl estradiol-ferrous fumarate (LOESTIN 24 FE) 1-20 MG-MCG(24) per tablet 28 tablet 0     Sig: Take 1 tablet by mouth Daily. Needs annual appointment.        Pharmacy where request should be sent:  [id number] - [address]     Additional details provided by patient: N/A    Does the patient have less than a 3 day supply:  [x] Yes  [] No    PLEASE CALL PT BACK ONCE REVIEWED SHE HAS ONLY 2 DAYS REMAINING.    DESIRE' Amina MICHELE Rep   06/17/22 09:54 EDT

## 2022-07-15 RX ORDER — NORETHINDRONE ACETATE AND ETHINYL ESTRADIOL AND FERROUS FUMARATE 1MG-20(24)
1 KIT ORAL DAILY
Qty: 28 TABLET | Refills: 0 | Status: SHIPPED | OUTPATIENT
Start: 2022-07-15 | End: 2022-07-18

## 2022-07-15 NOTE — TELEPHONE ENCOUNTER
Patient is calling about birth control that was sent into pharmacy today. She is stating that she will need her birth control by Sunday, but was just informed by pharmacy that authorization is needed.     Please advise,   Jewels

## 2022-07-15 NOTE — TELEPHONE ENCOUNTER
Caller: Anaya Conrad    Relationship: Self    Best call back number: [phone]    Requested Prescriptions:   Requested Prescriptions     Pending Prescriptions Disp Refills   • norethindrone-ethinyl estradiol-ferrous fumarate (LOESTIN 24 FE) 1-20 MG-MCG(24) per tablet 28 tablet 0     Sig: Take 1 tablet by mouth Daily. Needs annual appointment.        Pharmacy where request should be sent: ON FILE    Additional details provided by patient: ANNUAL SCHEDULED FOR 08/31/2022    Does the patient have less than a 3 day supply:  [x] Yes  [] No

## 2022-07-18 RX ORDER — NORETHINDRONE ACETATE AND ETHINYL ESTRADIOL AND FERROUS FUMARATE 1MG-20(24)
KIT ORAL
Qty: 56 TABLET | Refills: 0 | Status: SHIPPED | OUTPATIENT
Start: 2022-07-18 | End: 2022-10-19 | Stop reason: SDUPTHER

## 2022-07-29 NOTE — ED NOTES
"Pt states \"I think I have a uti. I went to the bathroom earlier today and it was pink tinged like it had blood in it and I feel like I have to go all the time.\"     Oly Squires RN  05/17/18 0283    "
Pt given urine specimen cup for collection. Pt educated on using both wipes for the clean catch.      Zarina Anna RN  05/17/18 7036    
36.1

## 2022-08-08 RX ORDER — SERTRALINE HYDROCHLORIDE 25 MG/1
TABLET, FILM COATED ORAL
Qty: 30 TABLET | Refills: 3 | Status: SHIPPED | OUTPATIENT
Start: 2022-08-08 | End: 2022-10-19

## 2022-09-09 ENCOUNTER — TELEPHONE (OUTPATIENT)
Dept: OBSTETRICS AND GYNECOLOGY | Facility: CLINIC | Age: 22
End: 2022-09-09

## 2022-09-09 RX ORDER — FLUCONAZOLE 150 MG/1
150 TABLET ORAL ONCE
Qty: 1 TABLET | Refills: 0 | Status: SHIPPED | OUTPATIENT
Start: 2022-09-09 | End: 2022-09-09

## 2022-09-09 NOTE — TELEPHONE ENCOUNTER
Isma,     She is very overdue for annual.   Sent in Rx to treat, but will need to set up annual exam.     Thanks,   Dr. Rosales

## 2022-09-09 NOTE — TELEPHONE ENCOUNTER
Patient says thinks she developed an yeast infection fr m taking antibiotics for about three days. Wondering if you could send a prescription for the yeast infection or does she have to come in?       Please advise,  Thank you

## 2022-10-19 ENCOUNTER — OFFICE VISIT (OUTPATIENT)
Dept: OBSTETRICS AND GYNECOLOGY | Facility: CLINIC | Age: 22
End: 2022-10-19

## 2022-10-19 VITALS
WEIGHT: 106 LBS | HEART RATE: 6 BPM | SYSTOLIC BLOOD PRESSURE: 127 MMHG | HEIGHT: 62 IN | DIASTOLIC BLOOD PRESSURE: 82 MMHG | BODY MASS INDEX: 19.51 KG/M2

## 2022-10-19 DIAGNOSIS — Z01.419 ENCOUNTER FOR GYNECOLOGICAL EXAMINATION WITHOUT ABNORMAL FINDING: Primary | ICD-10-CM

## 2022-10-19 DIAGNOSIS — N92.1 BREAKTHROUGH BLEEDING ON OCPS: ICD-10-CM

## 2022-10-19 DIAGNOSIS — Z11.3 SCREEN FOR STD (SEXUALLY TRANSMITTED DISEASE): ICD-10-CM

## 2022-10-19 DIAGNOSIS — Z30.41 ENCOUNTER FOR SURVEILLANCE OF CONTRACEPTIVE PILLS: ICD-10-CM

## 2022-10-19 PROCEDURE — 99395 PREV VISIT EST AGE 18-39: CPT | Performed by: OBSTETRICS & GYNECOLOGY

## 2022-10-19 RX ORDER — CETIRIZINE HYDROCHLORIDE 10 MG/1
10 TABLET ORAL
COMMUNITY
Start: 2022-05-24

## 2022-10-19 RX ORDER — NORETHINDRONE ACETATE AND ETHINYL ESTRADIOL AND FERROUS FUMARATE 1MG-20(24)
1 KIT ORAL DAILY
Qty: 90 TABLET | Refills: 4 | Status: SHIPPED | OUTPATIENT
Start: 2022-10-19 | End: 2022-11-30

## 2022-10-19 RX ORDER — FLUTICASONE PROPIONATE 50 MCG
1 SPRAY, SUSPENSION (ML) NASAL DAILY
COMMUNITY
Start: 2022-09-01

## 2022-10-19 NOTE — PROGRESS NOTES
GYN Annual Exam     CC- Here for annual exam.     Anaya Conrad is a 22 y.o. female who presents for annual well woman exam. Periods are irregular. She has a period every 2 weeks or every other month.     OB History        1    Para   1    Term   1       0    AB   0    Living   1       SAB   0    IAB   0    Ectopic   0    Molar   0    Multiple   0    Live Births   1                Current contraception: OCP (estrogen/progesterone)  History of abnormal Pap smear: no  Family history of uterine, colon or ovarian cancer: no  History of abnormal mammogram: no  Family history of breast cancer: no  Last Pap : 2020 NL HPV neg     Past Medical History:   Diagnosis Date   • Anemia    • Gestational diabetes     diet control   • Hypertension     CHTN, no meds and hasn't had any issues since pregnancy   • Migraines    • UTI (urinary tract infection)     before pregnancy, not recurring       History reviewed. No pertinent surgical history.      Current Outpatient Medications:   •  cetirizine (zyrTEC) 10 MG tablet, Take 1 tablet by mouth., Disp: , Rfl:   •  norethindrone-ethinyl estradiol-ferrous fumarate (Aurovela 24 FE) 1-20 MG-MCG(24) per tablet, Take 1 tablet by mouth Daily., Disp: 90 tablet, Rfl: 4  •  fluticasone (FLONASE) 50 MCG/ACT nasal spray, 1 spray by Each Nare route Daily. Shake before using., Disp: , Rfl:     No Known Allergies    Social History     Tobacco Use   • Smoking status: Former     Packs/day: 0.10     Years: 1.00     Pack years: 0.10     Types: Cigarettes     Quit date: 2020     Years since quittin.1   • Smokeless tobacco: Never   Substance Use Topics   • Alcohol use: Not Currently   • Drug use: No       Family History   Problem Relation Age of Onset   • Diabetes Mother    • Breast cancer Neg Hx    • Ovarian cancer Neg Hx    • Uterine cancer Neg Hx    • Colon cancer Neg Hx    • Deep vein thrombosis Neg Hx    • Pulmonary embolism Neg Hx        Review of Systems  "  Constitutional: Negative for chills and fever.   Gastrointestinal: Negative for abdominal pain, constipation and diarrhea.   Genitourinary: Negative for menstrual problem, pelvic pain, vaginal bleeding and vaginal discharge.   All other systems reviewed and are negative.      /82   Pulse (!) 6   Ht 157.5 cm (62\")   Wt 48.1 kg (106 lb)   LMP 10/16/2022 (Exact Date)   BMI 19.39 kg/m²     Physical Exam  Constitutional:       General: She is not in acute distress.     Appearance: She is well-developed and normal weight.   Genitourinary:      Vulva normal.      Right Labia: No lesions or Bartholin's cyst.     Left Labia: No lesions or Bartholin's cyst.     No inguinal adenopathy present in the right or left side.     No vaginal discharge or bleeding.        Right Adnexa: not tender, not full and no mass present.     Left Adnexa: not tender, not full and no mass present.     No cervical motion tenderness or friability.      Uterus is not enlarged or tender.      No uterine mass detected.     Uterus is anteverted.   Breasts:     Right: No inverted nipple, mass or nipple discharge.      Left: No inverted nipple, mass or nipple discharge.   HENT:      Head: Normocephalic and atraumatic.      Nose: Nose normal.   Eyes:      Conjunctiva/sclera: Conjunctivae normal.      Pupils: Pupils are equal, round, and reactive to light.   Neck:      Thyroid: No thyromegaly.   Cardiovascular:      Rate and Rhythm: Normal rate and regular rhythm.      Heart sounds: Normal heart sounds. No murmur heard.  Pulmonary:      Effort: Pulmonary effort is normal. No respiratory distress.      Breath sounds: Normal breath sounds.   Abdominal:      General: Abdomen is flat. There is no distension.      Palpations: Abdomen is soft.      Tenderness: There is no abdominal tenderness.   Musculoskeletal:         General: No deformity. Normal range of motion.      Cervical back: Normal range of motion and neck supple.      Right lower leg: No " edema.      Left lower leg: No edema.   Lymphadenopathy:      Lower Body: No right inguinal adenopathy. No left inguinal adenopathy.   Neurological:      Mental Status: She is alert and oriented to person, place, and time.   Skin:     General: Skin is warm and dry.      Findings: No erythema.   Psychiatric:         Behavior: Behavior normal.         Thought Content: Thought content normal.         Judgment: Judgment normal.   Vitals reviewed. Exam conducted with a chaperone present.               Assessment     Diagnoses and all orders for this visit:    1. Encounter for gynecological examination without abnormal finding (Primary)    2. Breakthrough bleeding on OCPs  -     Chlamydia trachomatis, Neisseria gonorrhoeae, Trichomonas vaginalis, PCR - Swab, Cervix  -     Urine Culture - , Urine, Clean Catch    3. Screen for STD (sexually transmitted disease)  -     Chlamydia trachomatis, Neisseria gonorrhoeae, Trichomonas vaginalis, PCR - Swab, Cervix    4. Encounter for surveillance of contraceptive pills    Other orders  -     norethindrone-ethinyl estradiol-ferrous fumarate (Aurovela 24 FE) 1-20 MG-MCG(24) per tablet; Take 1 tablet by mouth Daily.  Dispense: 90 tablet; Refill: 4    1) GYN exam/STD screen  Expectations reviewed.     2) BTB   Check STD and urine culture   If normal and persists would need to change to stronger pill ?  Refill sent for now.      Plan     1) Breast Health - Clinical breast exam yearly, Discussed American cancer society recommendations for breast cancer screening, and Self breast awareness monthly  2) Pap - up to date   3) Smoking status- non-smoke r  4) Encouraged to be wary of information obtained via social media and internet based on source and search.  5) Follow up prn and one year.       Gary Rosales MD   10/19/2022  15:37 EDT

## 2022-10-21 LAB
BACTERIA UR CULT: NORMAL
BACTERIA UR CULT: NORMAL
C TRACH RRNA SPEC QL NAA+PROBE: NEGATIVE
N GONORRHOEA RRNA SPEC QL NAA+PROBE: NEGATIVE
T VAGINALIS RRNA SPEC QL NAA+PROBE: NEGATIVE

## 2022-10-24 ENCOUNTER — TELEPHONE (OUTPATIENT)
Dept: OBSTETRICS AND GYNECOLOGY | Facility: CLINIC | Age: 22
End: 2022-10-24

## 2022-10-24 NOTE — TELEPHONE ENCOUNTER
----- Message from Gary Rosales MD sent at 10/21/2022  1:54 PM EDT -----  Oly, please let her know the STD screen for Chlamydia, Gonorrhea and trichomoniasis is negative. Urine culture negative. Thanks, Dr. Rosales

## 2022-11-30 ENCOUNTER — TELEPHONE (OUTPATIENT)
Dept: OBSTETRICS AND GYNECOLOGY | Facility: CLINIC | Age: 22
End: 2022-11-30

## 2022-11-30 RX ORDER — DROSPIRENONE AND ETHINYL ESTRADIOL 0.03MG-3MG
1 KIT ORAL DAILY
Qty: 84 TABLET | Refills: 3 | Status: SHIPPED | OUTPATIENT
Start: 2022-11-30 | End: 2023-11-30

## 2023-03-18 ENCOUNTER — HOSPITAL ENCOUNTER (EMERGENCY)
Facility: HOSPITAL | Age: 23
Discharge: HOME | End: 2023-03-18
Attending: EMERGENCY MEDICINE | Admitting: EMERGENCY MEDICINE
Payer: COMMERCIAL

## 2023-03-18 VITALS
DIASTOLIC BLOOD PRESSURE: 82 MMHG | BODY MASS INDEX: 17.85 KG/M2 | SYSTOLIC BLOOD PRESSURE: 131 MMHG | HEIGHT: 62 IN | TEMPERATURE: 97.2 F | OXYGEN SATURATION: 100 % | HEART RATE: 67 BPM | WEIGHT: 97 LBS | RESPIRATION RATE: 16 BRPM

## 2023-03-18 DIAGNOSIS — R11.2 NAUSEA AND VOMITING, UNSPECIFIED VOMITING TYPE: Primary | ICD-10-CM

## 2023-03-18 LAB
ALBUMIN SERPL-MCNC: 4.2 G/DL (ref 3.5–5.2)
ALBUMIN/GLOB SERPL: 1.6 G/DL
ALP SERPL-CCNC: 67 U/L (ref 39–117)
ALT SERPL W P-5'-P-CCNC: 20 U/L (ref 1–33)
ANION GAP SERPL CALCULATED.3IONS-SCNC: 10 MMOL/L (ref 5–15)
AST SERPL-CCNC: 23 U/L (ref 1–32)
BACTERIA UR QL AUTO: ABNORMAL /HPF
BASOPHILS # BLD AUTO: 0.01 10*3/MM3 (ref 0–0.2)
BASOPHILS NFR BLD AUTO: 0.2 % (ref 0–1.5)
BILIRUB SERPL-MCNC: 0.4 MG/DL (ref 0–1.2)
BILIRUB UR QL STRIP: NEGATIVE
BUN SERPL-MCNC: 15 MG/DL (ref 6–20)
BUN/CREAT SERPL: 20 (ref 7–25)
CALCIUM SPEC-SCNC: 8.5 MG/DL (ref 8.6–10.5)
CHLORIDE SERPL-SCNC: 103 MMOL/L (ref 98–107)
CLARITY UR: CLEAR
CO2 SERPL-SCNC: 23 MMOL/L (ref 22–29)
COLOR UR: ABNORMAL
CREAT SERPL-MCNC: 0.75 MG/DL (ref 0.57–1)
DEPRECATED RDW RBC AUTO: 37.5 FL (ref 37–54)
EGFRCR SERPLBLD CKD-EPI 2021: 114.2 ML/MIN/1.73
EOSINOPHIL # BLD AUTO: 0.03 10*3/MM3 (ref 0–0.4)
EOSINOPHIL NFR BLD AUTO: 0.6 % (ref 0.3–6.2)
ERYTHROCYTE [DISTWIDTH] IN BLOOD BY AUTOMATED COUNT: 12.3 % (ref 12.3–15.4)
GLOBULIN UR ELPH-MCNC: 2.6 GM/DL
GLUCOSE SERPL-MCNC: 78 MG/DL (ref 65–99)
GLUCOSE UR STRIP-MCNC: NEGATIVE MG/DL
HCG SERPL QL: NEGATIVE
HCT VFR BLD AUTO: 43.4 % (ref 34–46.6)
HGB BLD-MCNC: 14.4 G/DL (ref 12–15.9)
HGB UR QL STRIP.AUTO: NEGATIVE
HYALINE CASTS UR QL AUTO: ABNORMAL /LPF
IMM GRANULOCYTES # BLD AUTO: 0.02 10*3/MM3 (ref 0–0.05)
IMM GRANULOCYTES NFR BLD AUTO: 0.4 % (ref 0–0.5)
KETONES UR QL STRIP: ABNORMAL
LEUKOCYTE ESTERASE UR QL STRIP.AUTO: NEGATIVE
LYMPHOCYTES # BLD AUTO: 1.36 10*3/MM3 (ref 0.7–3.1)
LYMPHOCYTES NFR BLD AUTO: 25.4 % (ref 19.6–45.3)
MCH RBC QN AUTO: 28 PG (ref 26.6–33)
MCHC RBC AUTO-ENTMCNC: 33.2 G/DL (ref 31.5–35.7)
MCV RBC AUTO: 84.3 FL (ref 79–97)
MONOCYTES # BLD AUTO: 0.51 10*3/MM3 (ref 0.1–0.9)
MONOCYTES NFR BLD AUTO: 9.5 % (ref 5–12)
NEUTROPHILS NFR BLD AUTO: 3.43 10*3/MM3 (ref 1.7–7)
NEUTROPHILS NFR BLD AUTO: 63.9 % (ref 42.7–76)
NITRITE UR QL STRIP: NEGATIVE
NRBC BLD AUTO-RTO: 0 /100 WBC (ref 0–0.2)
PH UR STRIP.AUTO: 6 [PH] (ref 5–8)
PLATELET # BLD AUTO: 222 10*3/MM3 (ref 140–450)
PMV BLD AUTO: 11.2 FL (ref 6–12)
POTASSIUM SERPL-SCNC: 4.4 MMOL/L (ref 3.5–5.2)
PROT SERPL-MCNC: 6.8 G/DL (ref 6–8.5)
PROT UR QL STRIP: ABNORMAL
RBC # BLD AUTO: 5.15 10*6/MM3 (ref 3.77–5.28)
RBC # UR STRIP: ABNORMAL /HPF
REF LAB TEST METHOD: ABNORMAL
SODIUM SERPL-SCNC: 136 MMOL/L (ref 136–145)
SP GR UR STRIP: >=1.03 (ref 1–1.03)
SQUAMOUS #/AREA URNS HPF: ABNORMAL /HPF
UROBILINOGEN UR QL STRIP: ABNORMAL
WBC # UR STRIP: ABNORMAL /HPF
WBC NRBC COR # BLD: 5.36 10*3/MM3 (ref 3.4–10.8)

## 2023-03-18 PROCEDURE — 81001 URINALYSIS AUTO W/SCOPE: CPT | Performed by: NURSE PRACTITIONER

## 2023-03-18 PROCEDURE — 85025 COMPLETE CBC W/AUTO DIFF WBC: CPT | Performed by: NURSE PRACTITIONER

## 2023-03-18 PROCEDURE — 84703 CHORIONIC GONADOTROPIN ASSAY: CPT | Performed by: NURSE PRACTITIONER

## 2023-03-18 PROCEDURE — 25010000002 ONDANSETRON PER 1 MG: Performed by: NURSE PRACTITIONER

## 2023-03-18 PROCEDURE — 99283 EMERGENCY DEPT VISIT LOW MDM: CPT

## 2023-03-18 PROCEDURE — 80053 COMPREHEN METABOLIC PANEL: CPT | Performed by: NURSE PRACTITIONER

## 2023-03-18 PROCEDURE — 96374 THER/PROPH/DIAG INJ IV PUSH: CPT

## 2023-03-18 RX ORDER — ONDANSETRON 2 MG/ML
4 INJECTION INTRAMUSCULAR; INTRAVENOUS ONCE
Status: COMPLETED | OUTPATIENT
Start: 2023-03-18 | End: 2023-03-18

## 2023-03-18 RX ORDER — ONDANSETRON 4 MG/1
4 TABLET, ORALLY DISINTEGRATING ORAL EVERY 6 HOURS PRN
Qty: 12 TABLET | Refills: 0 | Status: SHIPPED | OUTPATIENT
Start: 2023-03-18

## 2023-03-18 RX ORDER — SODIUM CHLORIDE 0.9 % (FLUSH) 0.9 %
10 SYRINGE (ML) INJECTION AS NEEDED
Status: DISCONTINUED | OUTPATIENT
Start: 2023-03-18 | End: 2023-03-18 | Stop reason: HOSPADM

## 2023-03-18 RX ADMIN — ONDANSETRON 4 MG: 2 INJECTION INTRAMUSCULAR; INTRAVENOUS at 16:46

## 2023-03-18 RX ADMIN — SODIUM CHLORIDE 1000 ML: 9 INJECTION, SOLUTION INTRAVENOUS at 16:44

## 2023-03-18 NOTE — ED PROVIDER NOTES
EMERGENCY DEPARTMENT ENCOUNTER    Room Number:  08/08  Date of encounter:  3/18/2023  PCP: Lizabeth Saldana APRN  Patient Care Team:  Lizabeth Saldana APRN as PCP - General (Nurse Practitioner)   Independent Historians: Patient        PPE: Patient was placed in face mask in first look. Patient was wearing facemask when I entered the room and throughout our encounter. I wore full protective equipment throughout this patient encounter including a face mask, and gloves. Hand hygiene was performed before donning protective equipment and after removal when leaving the room.      HPI:  Chief Complaint: Vomiting and diarrhea  A complete HPI/ROS/PMH/PSH/SH/FH are unobtainable due to: Nothing    Chronic or social conditions impacting patient care (social determinants of health): None    Context: Anaya Conrad is a 22 y.o. female with a history of HTN, migraines, UTIs who arrives to the ED via private vehicle.  Patient presents with c/o nausea, vomiting and diarrhea that started Tuesday.   Patient also complains of headache and fatigue.  Patient states that on Wednesday she was seen at the urgent care, had a negative flu and COVID swab at that time.  She states her symptoms have continued over the week.  Patient states she is not able to keep anything down.  Patient denies fever, chills, dysuria, dizziness.  Patient states that nothing makes the symptoms better and p.o. intake worsens symptoms.  Patient currently on her menstrual cycle.    Review of prior external notes (non-ED): Medical records reviewed in [facility], patient seen at urgent care 3/15/2023 for vomiting diarrhea.  Patient was prescribed Phenergan and Zyrtec.    Review of prior external test results outside of this encounter: Patient had a negative flu and COVID swab 3/15/2023 she also had a negative pregnancy test.    PAST MEDICAL HISTORY  Active Ambulatory Problems     Diagnosis Date Noted   • Gestational diabetes mellitus (GDM) in third trimester  2020   • GBS (group B Streptococcus carrier), +RV culture, currently pregnant 2021   • Pregnancy 2021     Resolved Ambulatory Problems     Diagnosis Date Noted   • No Resolved Ambulatory Problems     Past Medical History:   Diagnosis Date   • Anemia    • Gestational diabetes    • Hypertension    • Migraines    • UTI (urinary tract infection)        The patient qualifies to receive the vaccine, but they have not yet received it.    PAST SURGICAL HISTORY  No past surgical history on file.      FAMILY HISTORY  Family History   Problem Relation Age of Onset   • Diabetes Mother    • Breast cancer Neg Hx    • Ovarian cancer Neg Hx    • Uterine cancer Neg Hx    • Colon cancer Neg Hx    • Deep vein thrombosis Neg Hx    • Pulmonary embolism Neg Hx          SOCIAL HISTORY  Social History     Socioeconomic History   • Marital status: Single   Tobacco Use   • Smoking status: Former     Packs/day: 0.10     Years: 1.00     Pack years: 0.10     Types: Cigarettes     Quit date: 2020     Years since quittin.5   • Smokeless tobacco: Never   Substance and Sexual Activity   • Alcohol use: Not Currently   • Drug use: No   • Sexual activity: Yes     Partners: Male     Birth control/protection: Pill         ALLERGIES  Patient has no known allergies.        REVIEW OF SYSTEMS  Review of Systems     All systems reviewed and negative except for those discussed in HPI.       PHYSICAL EXAM    I have reviewed the triage vital signs and nursing notes.    ED Triage Vitals   Temp Heart Rate Resp BP SpO2   23 1514 23 1513 23 1513 23 1515 23 1513   97.2 °F (36.2 °C) 116 16 (!) 118/101 94 %       Physical Exam  GENERAL: Well appearing, nontoxic appearing, not distressed  HENT: normocephalic, atraumatic  EYES: no scleral icterus, PERRL  CV: regular rhythm, regular rate, no murmur  RESPIRATORY: normal effort, CTAB  ABDOMEN: soft, normal bowel sounds, nontender  MUSCULOSKELETAL: no deformity  NEURO:  alert, moves all extremities, follows commands, mental status normal/baseline  SKIN: warm, dry, no rash   Psych: Appropriate mood and affect  Nursing notes and vital signs reviewed          LAB RESULTS  Recent Results (from the past 24 hour(s))   Comprehensive Metabolic Panel    Collection Time: 03/18/23  4:43 PM    Specimen: Blood   Result Value Ref Range    Glucose 78 65 - 99 mg/dL    BUN 15 6 - 20 mg/dL    Creatinine 0.75 0.57 - 1.00 mg/dL    Sodium 136 136 - 145 mmol/L    Potassium 4.4 3.5 - 5.2 mmol/L    Chloride 103 98 - 107 mmol/L    CO2 23.0 22.0 - 29.0 mmol/L    Calcium 8.5 (L) 8.6 - 10.5 mg/dL    Total Protein 6.8 6.0 - 8.5 g/dL    Albumin 4.2 3.5 - 5.2 g/dL    ALT (SGPT) 20 1 - 33 U/L    AST (SGOT) 23 1 - 32 U/L    Alkaline Phosphatase 67 39 - 117 U/L    Total Bilirubin 0.4 0.0 - 1.2 mg/dL    Globulin 2.6 gm/dL    A/G Ratio 1.6 g/dL    BUN/Creatinine Ratio 20.0 7.0 - 25.0    Anion Gap 10.0 5.0 - 15.0 mmol/L    eGFR 114.2 >60.0 mL/min/1.73   hCG, Serum, Qualitative    Collection Time: 03/18/23  4:43 PM    Specimen: Blood   Result Value Ref Range    HCG Qualitative Negative Negative   Urinalysis With Microscopic If Indicated (No Culture) - Urine, Clean Catch    Collection Time: 03/18/23  4:43 PM    Specimen: Urine, Clean Catch   Result Value Ref Range    Color, UA Dark Yellow (A) Yellow, Straw    Appearance, UA Clear Clear    pH, UA 6.0 5.0 - 8.0    Specific Gravity, UA >=1.030 1.005 - 1.030    Glucose, UA Negative Negative    Ketones, UA Trace (A) Negative    Bilirubin, UA Negative Negative    Blood, UA Negative Negative    Protein, UA 30 mg/dL (1+) (A) Negative    Leuk Esterase, UA Negative Negative    Nitrite, UA Negative Negative    Urobilinogen, UA 0.2 E.U./dL 0.2 - 1.0 E.U./dL   CBC Auto Differential    Collection Time: 03/18/23  4:43 PM    Specimen: Blood   Result Value Ref Range    WBC 5.36 3.40 - 10.80 10*3/mm3    RBC 5.15 3.77 - 5.28 10*6/mm3    Hemoglobin 14.4 12.0 - 15.9 g/dL    Hematocrit 43.4  34.0 - 46.6 %    MCV 84.3 79.0 - 97.0 fL    MCH 28.0 26.6 - 33.0 pg    MCHC 33.2 31.5 - 35.7 g/dL    RDW 12.3 12.3 - 15.4 %    RDW-SD 37.5 37.0 - 54.0 fl    MPV 11.2 6.0 - 12.0 fL    Platelets 222 140 - 450 10*3/mm3    Neutrophil % 63.9 42.7 - 76.0 %    Lymphocyte % 25.4 19.6 - 45.3 %    Monocyte % 9.5 5.0 - 12.0 %    Eosinophil % 0.6 0.3 - 6.2 %    Basophil % 0.2 0.0 - 1.5 %    Immature Grans % 0.4 0.0 - 0.5 %    Neutrophils, Absolute 3.43 1.70 - 7.00 10*3/mm3    Lymphocytes, Absolute 1.36 0.70 - 3.10 10*3/mm3    Monocytes, Absolute 0.51 0.10 - 0.90 10*3/mm3    Eosinophils, Absolute 0.03 0.00 - 0.40 10*3/mm3    Basophils, Absolute 0.01 0.00 - 0.20 10*3/mm3    Immature Grans, Absolute 0.02 0.00 - 0.05 10*3/mm3    nRBC 0.0 0.0 - 0.2 /100 WBC   Urinalysis, Microscopic Only - Urine, Clean Catch    Collection Time: 03/18/23  4:43 PM    Specimen: Urine, Clean Catch   Result Value Ref Range    RBC, UA 0-2 None Seen, 0-2 /HPF    WBC, UA 31-50 (A) None Seen, 0-2 /HPF    Bacteria, UA None Seen None Seen /HPF    Squamous Epithelial Cells, UA 0-2 None Seen, 0-2 /HPF    Hyaline Casts, UA 21-30 None Seen /LPF    Methodology Automated Microscopy        Ordered the above labs and independently reviewed the results.        RADIOLOGY  No Radiology Exams Resulted Within Past 24 Hours    I ordered the above noted radiological studies. Reviewed by me and discussed with radiologist.  See dictation for official radiology interpretation.      PROCEDURES    Procedures    DIFFERENTIAL DIAGNOSIS:  Differential diagnoses include but are not limited to the following: Gastroenteritis, dehydration, UTI, electrolyte abnormality    PROGRESS, DATA ANALYSIS, CONSULTS, AND MEDICAL DECISION MAKING    All labs have been independently reviewed by me.  All radiology studies have been reviewed by me and discussed with radiologist dictating the report.   EKG's independently viewed and interpreted by me.  Discussion below represents my analysis of pertinent  findings related to patient's condition, differential diagnosis, treatment plan and final disposition.        ED Course as of 03/18/23 2027   Sat Mar 18, 2023   1642 Patient is a 22-year-old who presents today with nausea, vomiting and diarrhea since Tuesday.  She denies any known sick contacts.  Plan for labs including urinalysis, IV fluids and Zofran. [MS]   1730 Reviewed pt's history and workup with Dr. Armstrong.  After a bedside evaluation, he agrees with the plan of care.    ' [MS]   1732 Patient updated on unremarkable lab work done here today including chemistry, white count, hemoglobin.  I feel that her symptoms are viral in nature, she has a benign abdominal exam therefore CT of the abdomen pelvis not warranted at this time.  She was provided strict return to ER precautions and close follow-up with her PMD. [MS]      ED Course User Index  [MS] Yamila Monte, APRN         DISPOSITION  ED Disposition     ED Disposition   Discharge    Condition   Stable    Comment   --           Discussed plan for discharge, as there is no emergent indication for admission. Pt/family is agreeable and understands need for follow up and repeat testing.  Pt is aware that discharge does not mean that nothing is wrong but it indicates no emergency is present that requires admission and they must continue care with follow-up as given below or physician of their choice.   Patient/Family voiced understanding of above instructions.  Patient discharged in stable condition.    DIAGNOSIS  Final diagnoses:   Nausea and vomiting, unspecified vomiting type       FOLLOW UP   Lizabeth Saldana, APRN  [address] [id number] [phone]    Call in 2 days        RX     Medication List      New Prescriptions    ondansetron ODT 4 MG disintegrating tablet  Commonly known as: ZOFRAN-ODT  Place 1 tablet on the tongue Every 6 (Six) Hours As Needed for Vomiting or Nausea.           Where to Get Your Medications       These medications were sent to [id number] - [place], [place] - [address] [facility] [address] - [phone]  - [phone] FX  [address], [facility] [address]    Phone: [phone]   · ondansetron ODT 4 MG disintegrating tablet           AS OF 20:27 EDT VITALS:    BP - 131/82  HR - 67  TEMP - 97.2 °F (36.2 °C) (Tympanic)  O2 SATS - 100%      MEDICATIONS GIVEN IN ER    Medications   sodium chloride 0.9 % bolus 1,000 mL (0 mL Intravenous Stopped 3/18/23 1747)   ondansetron (ZOFRAN) injection 4 mg (4 mg Intravenous Given 3/18/23 1646)                Note Disclaimer: At [facility], we believe that sharing information builds trust and better relationships. You are receiving this note because you recently visited [facility]. It is possible you will see health information before a provider has talked with you about it. This kind of information can be easy to misunderstand. To help you fully understand what it means for your health, we urge you to discuss this note with your provider.       Yamila Monte, APRN  03/18/23 2027

## 2023-03-18 NOTE — ED PROVIDER NOTES
MD ATTESTATION NOTE    The [facility] and I have discussed this patient's history, physical exam, and treatment plan.  I have reviewed the documentation and personally had a face to face interaction with the patient. I affirm the documentation and agree with the treatment and plan.  The attached note describes my personal findings.      I provided a substantive portion of the care of the patient.  I personally performed the physical exam in its entirety, and below are my findings.  For this patient encounter, the patient wore surgical mask, I wore full protective PPE including N95 and eye protection.      Brief HPI: This patient is a 22-year-old female presenting to the emergency room today with nausea/vomiting/diarrhea that has been present now for the past 4 to 5 days.  She currently denies fever/chills, chest pain, abdominal pain, or back pain.      PHYSICAL EXAM  ED Triage Vitals   Temp Heart Rate Resp BP SpO2   03/18/23 1514 03/18/23 1513 03/18/23 1513 03/18/23 1515 03/18/23 1513   97.2 °F (36.2 °C) 116 16 (!) 118/101 94 %      Temp src Heart Rate Source Patient Position BP Location FiO2 (%)   03/18/23 1514 -- -- -- --   Tympanic             GENERAL: Resting comfortably and in no acute distress, nontoxic in appearance  HENT: nares patent  EYES: no scleral icterus  CV: regular rhythm, normal rate, no M/R/G  RESPIRATORY: normal effort, lungs clear bilaterally  ABDOMEN: soft, nontender, no rebound or guarding  MUSCULOSKELETAL: no deformity, no edema  NEURO: alert, moves all extremities, follows commands  PSYCH:  calm, cooperative  SKIN: warm, dry    Vital signs and nursing notes reviewed.        Plan: We will treat the patient with IV antiemetics as well as IV fluids as we obtain labs in the ED today.  We will monitor and reassess following.       Justin Cabrera MD  03/18/23 1548

## 2023-03-18 NOTE — ED TRIAGE NOTES
From home with c/o n/v/d, abd pain x 5 days.  Seen at [facility] 3 days ago for same c/o, dx with viral illness.  Had negative flu and covid tests.    Pt wearing mask on arrival.

## 2023-07-25 ENCOUNTER — INITIAL PRENATAL (OUTPATIENT)
Dept: OBSTETRICS AND GYNECOLOGY | Facility: CLINIC | Age: 23
End: 2023-07-25
Payer: COMMERCIAL

## 2023-07-25 VITALS — WEIGHT: 105 LBS | SYSTOLIC BLOOD PRESSURE: 123 MMHG | DIASTOLIC BLOOD PRESSURE: 87 MMHG | BODY MASS INDEX: 19.2 KG/M2

## 2023-07-25 DIAGNOSIS — O36.80X0 PREGNANCY WITH UNCERTAIN FETAL VIABILITY, SINGLE OR UNSPECIFIED FETUS: ICD-10-CM

## 2023-07-25 DIAGNOSIS — Z12.4 CERVICAL CANCER SCREENING: ICD-10-CM

## 2023-07-25 DIAGNOSIS — O09.291 HISTORY OF GESTATIONAL DIABETES IN PRIOR PREGNANCY, CURRENTLY PREGNANT IN FIRST TRIMESTER: ICD-10-CM

## 2023-07-25 DIAGNOSIS — Z34.81 ENCOUNTER FOR SUPERVISION OF OTHER NORMAL PREGNANCY IN FIRST TRIMESTER: Primary | ICD-10-CM

## 2023-07-25 DIAGNOSIS — Z86.32 HISTORY OF GESTATIONAL DIABETES IN PRIOR PREGNANCY, CURRENTLY PREGNANT IN FIRST TRIMESTER: ICD-10-CM

## 2023-07-25 DIAGNOSIS — Z78.9 VARICELLA VACCINATION STATUS UNKNOWN: ICD-10-CM

## 2023-07-25 DIAGNOSIS — Z11.3 SCREEN FOR STD (SEXUALLY TRANSMITTED DISEASE): ICD-10-CM

## 2023-07-25 DIAGNOSIS — O10.011 PRE-EXISTING ESSENTIAL HYPERTENSION DURING PREGNANCY IN FIRST TRIMESTER: ICD-10-CM

## 2023-07-25 LAB
GLUCOSE UR STRIP-MCNC: NEGATIVE MG/DL
PROT UR STRIP-MCNC: NEGATIVE MG/DL

## 2023-07-25 PROCEDURE — 0501F PRENATAL FLOW SHEET: CPT | Performed by: OBSTETRICS & GYNECOLOGY

## 2023-07-25 PROCEDURE — 81002 URINALYSIS NONAUTO W/O SCOPE: CPT | Performed by: OBSTETRICS & GYNECOLOGY

## 2023-07-25 RX ORDER — SWAB
1 SWAB, NON-MEDICATED MISCELLANEOUS DAILY
Qty: 90 EACH | Refills: 3 | Status: SHIPPED | OUTPATIENT
Start: 2023-07-25

## 2023-07-25 NOTE — PROGRESS NOTES
Initial ob visit     CC- Here for care of pregnancy     Anaya Conrad is being seen today for her first obstetrical visit.  She is a 23 y.o.  7w1d gestation.     # 1 - Date: 21, Sex: Female, Weight: 3013 g (6 lb 10.3 oz), GA: 38w6d, Delivery: Vaginal, Spontaneous, Apgar1: 8, Apgar5: 9, Living: Living, Birth Comments: scale 4     # 2 - Date: None, Sex: None, Weight: None, GA: None, Delivery: None, Apgar1: None, Apgar5: None, Living: None, Birth Comments: None      Current obstetric complaints : none  Duration/severity of complaints:   Initial positive test date : 2023   Location : @ home    Prior obstetric issues, potential pregnancy concerns: H/o GDM, HTN- was taking Lisinopril, but stopped, Anemia  Family history of genetic issues (includes FOB): none  Prior infections concerning in pregnancy (Rash, fever in last 2 weeks): none  Varicella Hx -non immune  Prior testing for Cystic Fibrosis Carrier or Sickle Cell Trait- unknown status  Prepregnancy BMI - Body mass index is 19.2 kg/m².  Hx of HSV for patient or partner : No      Past Medical History:   Diagnosis Date    Anemia     Gestational diabetes     diet control    Gestational hypertension     Hypertension     CHTN, no meds and hasn't had any issues since pregnancy    Migraines     UTI (urinary tract infection)     before pregnancy, not recurring       History reviewed. No pertinent surgical history.      Current Outpatient Medications:     Prenatal 28-0.8 MG tablet, Take 1 tablet by mouth Daily. Please use formulary or generic, with DHA ideal, Disp: 90 each, Rfl: 3    No Known Allergies    Social History     Socioeconomic History    Marital status: Single   Tobacco Use    Smoking status: Former     Packs/day: 0.10     Years: 1.00     Pack years: 0.10     Types: Cigarettes     Quit date: 2020     Years since quittin.8    Smokeless tobacco: Never   Vaping Use    Vaping Use: Never used   Substance and Sexual Activity    Alcohol use:  Not Currently    Drug use: No    Sexual activity: Yes     Partners: Male       Family History   Problem Relation Age of Onset    Diabetes Mother     Breast cancer Neg Hx     Ovarian cancer Neg Hx     Uterine cancer Neg Hx     Colon cancer Neg Hx     Deep vein thrombosis Neg Hx     Pulmonary embolism Neg Hx        Review of systems     Review of Systems   Constitutional:  Negative for fever.   Eyes:  Negative for visual disturbance.   Gastrointestinal:  Negative for abdominal pain, nausea and vomiting.   Genitourinary:  Negative for breast pain, pelvic pain, vaginal bleeding and vaginal discharge.   Neurological:  Negative for headache.   All other systems reviewed and are negative.        Objective    /87   Wt 47.6 kg (105 lb)   LMP 06/05/2023 (Approximate)   BMI 19.20 kg/m²     Prenatal Assessment  Fundal Height (cm): 6 cm  Movement: Absent  Dilation/Effacement/Station  Dilation: Closed  Effacement (%): 0  Station: -2  Edema  LLE Edema: None  RLE Edema: None  Facial Edema: None    Physical Exam  Constitutional:       General: She is not in acute distress.     Appearance: Normal appearance. She is normal weight.   Genitourinary:      Right Labia: No lesions or Bartholin's cyst.     Left Labia: No lesions or Bartholin's cyst.     No inguinal adenopathy present in the right or left side.     No vaginal discharge or bleeding.        Right Adnexa: not tender, not full and no mass present.     Left Adnexa: not tender, not full and no mass present.     No cervical motion tenderness or lesion.      Uterus is enlarged (7-8 weeks).      Uterus is not tender.      No uterine mass detected.     Uterus is anteverted.   Breasts:     Right: No inverted nipple, mass or nipple discharge.      Left: No inverted nipple, mass or nipple discharge.   HENT:      Head: Normocephalic and atraumatic.      Mouth/Throat:      Mouth: Mucous membranes are moist.   Eyes:      Conjunctiva/sclera: Conjunctivae normal.   Cardiovascular:       Rate and Rhythm: Normal rate and regular rhythm.      Pulses: Normal pulses.      Heart sounds: No murmur heard.  Pulmonary:      Effort: Pulmonary effort is normal. No respiratory distress.      Breath sounds: Normal breath sounds.   Abdominal:      General: There is no distension.      Palpations: Abdomen is soft.      Tenderness: There is no abdominal tenderness.   Musculoskeletal:         General: No tenderness.      Cervical back: Normal range of motion and neck supple. No tenderness.      Right lower leg: No edema.      Left lower leg: No edema.   Lymphadenopathy:      Cervical: No cervical adenopathy.      Lower Body: No right inguinal adenopathy. No left inguinal adenopathy.   Neurological:      General: No focal deficit present.      Mental Status: She is alert.      Deep Tendon Reflexes: Reflexes normal.   Skin:     General: Skin is warm and dry.      Capillary Refill: Capillary refill takes less than 2 seconds.   Psychiatric:         Thought Content: Thought content normal.   Vitals reviewed. Exam conducted with a chaperone present.        Brief Urine Lab Results  (Last result in the past 365 days)        Color   Clarity   Blood   Leuk Est   Nitrite   Protein   CREAT   Urine HCG        07/25/23 1325           Negative                    Assessment & Plan     Diagnoses and all orders for this visit:    1. Encounter for supervision of other normal pregnancy in first trimester (Primary)  -     POC Urinalysis Dipstick  -     OB Panel With HIV  -     Urine Culture - , Urine, Clean Catch    2. Pregnancy with uncertain fetal viability, single or unspecified fetus  -     US Ob Transvaginal    3. Pre-existing essential hypertension during pregnancy in first trimester  -     Comprehensive Metabolic Panel  -     Protein / Creatinine Ratio, Urine - Urine, Clean Catch    4. History of gestational diabetes in prior pregnancy, currently pregnant in first trimester  -     TSH  -     T4, Free  -     Hemoglobin A1c  -      Comprehensive Metabolic Panel    5. Screen for STD (sexually transmitted disease)  -     Chlamydia trachomatis, Neisseria gonorrhoeae, Trichomonas vaginalis, PCR - Swab, Vagina    6. Cervical cancer screening  -     IGP, Rfx Aptima HPV ASCU    7. Varicella vaccination status unknown  -     Varicella Zoster Antibody, IgG    Other orders  -     Prenatal 28-0.8 MG tablet; Take 1 tablet by mouth Daily. Please use formulary or generic, with DHA ideal  Dispense: 90 each; Refill: 3        1) Pregnancy at 7w1d  Routine care reviewed.   2) Ultrasound today 6 weeks with YS and GS  No fetal pole or heart beat  Repeat in 2 weeks for viability (interval change)   3) Chronic hypertension   Off lisinopril   BP okay   Check baseline labs, no meds for now.   Consider ASA at 12 weeks and medication down the line   4) hx of Gest DM   Checking HgA1c and thyroid function   See if needs testing earlier than 26-28 weeks   5) hx of varicella non-immune  Check status now          Activity recommendation : 150 minutes/week of moderate intensity aerobic activity unless we limit for bleeding, hypertension or other pregnancy complication   Patient is on Prenatal vitamins  Problem list reviewed and updated.  Reviewed routine prenatal care with the office to include but not limited to   General pregnancy recommendations reviewed including but not limited to not changing cat litter, food restrictions, avoidance of alcohol, tobacco and drugs and saunas/hot tubs.   All questions answered.     Gary Rosales MD   7/25/2023  13:53 EDT

## 2023-07-26 LAB
ABO GROUP BLD: NORMAL
ALBUMIN SERPL-MCNC: 4.4 G/DL (ref 4–5)
ALBUMIN/GLOB SERPL: 2.4 {RATIO} (ref 1.2–2.2)
ALP SERPL-CCNC: 65 IU/L (ref 44–121)
ALT SERPL-CCNC: 10 IU/L (ref 0–32)
AST SERPL-CCNC: 13 IU/L (ref 0–40)
BASOPHILS # BLD AUTO: 0 X10E3/UL (ref 0–0.2)
BASOPHILS NFR BLD AUTO: 0 %
BILIRUB SERPL-MCNC: 0.3 MG/DL (ref 0–1.2)
BLD GP AB SCN SERPL QL: NEGATIVE
BUN SERPL-MCNC: 7 MG/DL (ref 6–20)
BUN/CREAT SERPL: 12 (ref 9–23)
CALCIUM SERPL-MCNC: 8.6 MG/DL (ref 8.7–10.2)
CHLORIDE SERPL-SCNC: 106 MMOL/L (ref 96–106)
CO2 SERPL-SCNC: 20 MMOL/L (ref 20–29)
CREAT SERPL-MCNC: 0.59 MG/DL (ref 0.57–1)
CREAT UR-MCNC: 23.8 MG/DL
EGFRCR SERPLBLD CKD-EPI 2021: 128 ML/MIN/1.73
EOSINOPHIL # BLD AUTO: 0.1 X10E3/UL (ref 0–0.4)
EOSINOPHIL NFR BLD AUTO: 2 %
ERYTHROCYTE [DISTWIDTH] IN BLOOD BY AUTOMATED COUNT: 12.9 % (ref 11.7–15.4)
GLOBULIN SER CALC-MCNC: 1.8 G/DL (ref 1.5–4.5)
GLUCOSE SERPL-MCNC: 90 MG/DL (ref 70–99)
HBA1C MFR BLD: 5.1 % (ref 4.8–5.6)
HBV SURFACE AG SERPL QL IA: NEGATIVE
HCT VFR BLD AUTO: 37 % (ref 34–46.6)
HCV IGG SERPL QL IA: NON REACTIVE
HGB BLD-MCNC: 12.3 G/DL (ref 11.1–15.9)
HIV 1+2 AB+HIV1 P24 AG SERPL QL IA: NON REACTIVE
IMM GRANULOCYTES # BLD AUTO: 0 X10E3/UL (ref 0–0.1)
IMM GRANULOCYTES NFR BLD AUTO: 0 %
LYMPHOCYTES # BLD AUTO: 2.2 X10E3/UL (ref 0.7–3.1)
LYMPHOCYTES NFR BLD AUTO: 37 %
MCH RBC QN AUTO: 28.7 PG (ref 26.6–33)
MCHC RBC AUTO-ENTMCNC: 33.2 G/DL (ref 31.5–35.7)
MCV RBC AUTO: 86 FL (ref 79–97)
MONOCYTES # BLD AUTO: 0.4 X10E3/UL (ref 0.1–0.9)
MONOCYTES NFR BLD AUTO: 6 %
NEUTROPHILS # BLD AUTO: 3.3 X10E3/UL (ref 1.4–7)
NEUTROPHILS NFR BLD AUTO: 55 %
PLATELET # BLD AUTO: 206 X10E3/UL (ref 150–450)
POTASSIUM SERPL-SCNC: 4 MMOL/L (ref 3.5–5.2)
PROT SERPL-MCNC: 6.2 G/DL (ref 6–8.5)
PROT UR-MCNC: <4 MG/DL
PROT/CREAT UR: NORMAL MG/G CREA
RBC # BLD AUTO: 4.29 X10E6/UL (ref 3.77–5.28)
RH BLD: POSITIVE
RPR SER QL: NON REACTIVE
RUBV IGG SERPL IA-ACNC: 5.25 INDEX
SODIUM SERPL-SCNC: 140 MMOL/L (ref 134–144)
T4 FREE SERPL-MCNC: 1.06 NG/DL (ref 0.82–1.77)
TSH SERPL DL<=0.005 MIU/L-ACNC: 1.09 UIU/ML (ref 0.45–4.5)
WBC # BLD AUTO: 5.9 X10E3/UL (ref 3.4–10.8)

## 2023-07-27 ENCOUNTER — TELEPHONE (OUTPATIENT)
Dept: OBSTETRICS AND GYNECOLOGY | Facility: CLINIC | Age: 23
End: 2023-07-27
Payer: COMMERCIAL

## 2023-07-27 DIAGNOSIS — O98.819 CHLAMYDIA INFECTION DURING PREGNANCY: Primary | ICD-10-CM

## 2023-07-27 DIAGNOSIS — A74.9 CHLAMYDIA INFECTION DURING PREGNANCY: Primary | ICD-10-CM

## 2023-07-27 LAB
BACTERIA UR CULT: NORMAL
BACTERIA UR CULT: NORMAL
C TRACH RRNA SPEC QL NAA+PROBE: POSITIVE
N GONORRHOEA RRNA SPEC QL NAA+PROBE: NEGATIVE
T VAGINALIS RRNA SPEC QL NAA+PROBE: NEGATIVE

## 2023-07-27 RX ORDER — AZITHROMYCIN 500 MG/1
TABLET, FILM COATED ORAL
Qty: 2 TABLET | Refills: 0 | Status: SHIPPED | OUTPATIENT
Start: 2023-07-27

## 2023-07-27 NOTE — TELEPHONE ENCOUNTER
Please call the patient and inform her that her testing came back positive for Chlamydia. She will be prescribed 1g Azithromycin PO x 1 dose.  Please  patient that sexual partner(s) should be tested and treated and refrain from intercourse for at least 7 days after both she and her partner(s) have been treated.  Positive tests are important to treat, because these infections can lead to pelvic inflammatory disease (PID) and/or tubal scarring.  It is important to let us know if she is unable to take the medication or have an episode of vomiting after taking the medication.  Also, let us know if she has abdominal pain, fever, or other concerning signs or symptoms.

## 2023-07-28 LAB
CONV .: NORMAL
CYTOLOGIST CVX/VAG CYTO: NORMAL
CYTOLOGY CVX/VAG DOC CYTO: NORMAL
CYTOLOGY CVX/VAG DOC THIN PREP: NORMAL
DX ICD CODE: NORMAL
HIV 1 & 2 AB SER-IMP: NORMAL
OTHER STN SPEC: NORMAL
STAT OF ADQ CVX/VAG CYTO-IMP: NORMAL

## 2023-08-07 ENCOUNTER — TELEPHONE (OUTPATIENT)
Dept: OBSTETRICS AND GYNECOLOGY | Facility: CLINIC | Age: 23
End: 2023-08-07
Payer: COMMERCIAL

## 2023-08-07 NOTE — TELEPHONE ENCOUNTER
----- Message from Gary Rosales MD sent at 8/7/2023 12:27 PM EDT -----  Oly, per notes. She is aware of testing and has been treated for chlamydia.  Will do test of cure at follow up ob visit. Thanks, Dr. Rosales

## 2023-08-08 ENCOUNTER — ROUTINE PRENATAL (OUTPATIENT)
Dept: OBSTETRICS AND GYNECOLOGY | Facility: CLINIC | Age: 23
End: 2023-08-08
Payer: COMMERCIAL

## 2023-08-08 VITALS — SYSTOLIC BLOOD PRESSURE: 131 MMHG | WEIGHT: 106 LBS | DIASTOLIC BLOOD PRESSURE: 84 MMHG | BODY MASS INDEX: 19.39 KG/M2

## 2023-08-08 DIAGNOSIS — O09.291 HISTORY OF GESTATIONAL DIABETES IN PRIOR PREGNANCY, CURRENTLY PREGNANT IN FIRST TRIMESTER: ICD-10-CM

## 2023-08-08 DIAGNOSIS — Z34.81 ENCOUNTER FOR SUPERVISION OF OTHER NORMAL PREGNANCY IN FIRST TRIMESTER: Primary | ICD-10-CM

## 2023-08-08 DIAGNOSIS — Z86.32 HISTORY OF GESTATIONAL DIABETES IN PRIOR PREGNANCY, CURRENTLY PREGNANT IN FIRST TRIMESTER: ICD-10-CM

## 2023-08-08 DIAGNOSIS — O98.311 CHLAMYDIA TRACHOMATIS INFECTION IN MOTHER DURING FIRST TRIMESTER OF PREGNANCY: ICD-10-CM

## 2023-08-08 DIAGNOSIS — O10.011 PRE-EXISTING ESSENTIAL HYPERTENSION DURING PREGNANCY IN FIRST TRIMESTER: ICD-10-CM

## 2023-08-08 DIAGNOSIS — A56.8 CHLAMYDIA TRACHOMATIS INFECTION IN MOTHER DURING FIRST TRIMESTER OF PREGNANCY: ICD-10-CM

## 2023-08-08 LAB
GLUCOSE UR STRIP-MCNC: NEGATIVE MG/DL
PROT UR STRIP-MCNC: NEGATIVE MG/DL

## 2023-08-08 NOTE — PROGRESS NOTES
OB follow up     Anaya Conrad is a 23 y.o.  7w3d being seen today for her obstetrical visit.  Patient reports no complaints. Fetal movement:  absent .    Her prenatal care is complicated by (and status): chlamydia in pregnancy, hx of chronic HTN, Hx of Gest DM     Review of Systems  Cramping/contractions : none   Vaginal bleeding: none   Fetal movement good     /84   Wt 48.1 kg (106 lb)   LMP 2023 (Approximate)   BMI 19.39 kg/mý     Prenatal Assessment  Fetal Heart Rate: +  Fundal Height (cm): 7 cm  Movement: Absent  Edema  LLE Edema: None  RLE Edema: None  Facial Edema: None        Assessment    Diagnoses and all orders for this visit:    1. Encounter for supervision of other normal pregnancy in first trimester (Primary)  -     POC Urinalysis Dipstick    2. Pre-existing essential hypertension during pregnancy in first trimester    3. History of gestational diabetes in prior pregnancy, currently pregnant in first trimester    4. Chlamydia trachomatis infection in mother during first trimester of pregnancy        1) pregnancy at 7w3d   Pap normal   Urine culture negative  Prenatal panel good   Interval ultrasound with viable IUP   2) + chlamydia at initial visit. - treated - MAX next visit for Chlamydia  3) hx of chronic HTN   Stable BP off meds   Start ASA at 12+ weeks   Baseline testing reassuring   4) Hx of GDM  Baseline testing reassuring   Screen 26-28 weeks         Plan    Reviewed this stage of pregnancy  Problem list updated   Follow up in 4 weeks    Gary Rosales MD   2023  16:17 EDT

## 2023-09-05 ENCOUNTER — ROUTINE PRENATAL (OUTPATIENT)
Dept: OBSTETRICS AND GYNECOLOGY | Facility: CLINIC | Age: 23
End: 2023-09-05
Payer: COMMERCIAL

## 2023-09-05 VITALS — WEIGHT: 106 LBS | DIASTOLIC BLOOD PRESSURE: 67 MMHG | BODY MASS INDEX: 19.39 KG/M2 | SYSTOLIC BLOOD PRESSURE: 121 MMHG

## 2023-09-05 DIAGNOSIS — O98.311 CHLAMYDIA TRACHOMATIS INFECTION IN MOTHER DURING FIRST TRIMESTER OF PREGNANCY: ICD-10-CM

## 2023-09-05 DIAGNOSIS — Z34.81 ENCOUNTER FOR SUPERVISION OF OTHER NORMAL PREGNANCY IN FIRST TRIMESTER: Primary | ICD-10-CM

## 2023-09-05 DIAGNOSIS — O10.011 PRE-EXISTING ESSENTIAL HYPERTENSION DURING PREGNANCY IN FIRST TRIMESTER: ICD-10-CM

## 2023-09-05 DIAGNOSIS — A56.8 CHLAMYDIA TRACHOMATIS INFECTION IN MOTHER DURING FIRST TRIMESTER OF PREGNANCY: ICD-10-CM

## 2023-09-05 DIAGNOSIS — Z86.32 HISTORY OF GESTATIONAL DIABETES IN PRIOR PREGNANCY, CURRENTLY PREGNANT IN FIRST TRIMESTER: ICD-10-CM

## 2023-09-05 DIAGNOSIS — Z13.79 ENCOUNTER FOR GENETIC SCREENING FOR DOWN SYNDROME: ICD-10-CM

## 2023-09-05 DIAGNOSIS — O09.291 HISTORY OF GESTATIONAL DIABETES IN PRIOR PREGNANCY, CURRENTLY PREGNANT IN FIRST TRIMESTER: ICD-10-CM

## 2023-09-05 LAB
GLUCOSE UR STRIP-MCNC: NEGATIVE MG/DL
PROT UR STRIP-MCNC: NEGATIVE MG/DL

## 2023-09-05 NOTE — PROGRESS NOTES
OB follow up     Anaya Conrad is a 23 y.o.  11w3d being seen today for her obstetrical visit.  Patient reports no complaints. Fetal movement: normal.    Her prenatal care is complicated by (and status): 1) HTN, 2) + chlamydia, and 3) Hx of GDM    Review of Systems  Cramping/contractions : none   Vaginal bleeding: none   Fetal movement too early     /67   Wt 48.1 kg (106 lb)   LMP 2023 (Approximate)   BMI 19.39 kg/m²     Prenatal Assessment  Fetal Heart Rate: 166  Fundal Height (cm): 12 cm  Movement: Absent  Edema  LLE Edema: None  RLE Edema: None  Facial Edema: None        Assessment    Diagnoses and all orders for this visit:    1. Encounter for supervision of other normal pregnancy in first trimester (Primary)  -     POC Urinalysis Dipstick    2. Pre-existing essential hypertension during pregnancy in first trimester    3. History of gestational diabetes in prior pregnancy, currently pregnant in first trimester    4. Chlamydia trachomatis infection in mother during first trimester of pregnancy  -     Chlamydia trachomatis, Neisseria gonorrhoeae, Trichomonas vaginalis, PCR - Swab, Cervix    5. Encounter for genetic screening for Down Syndrome  -     [id number] PLUS Core+SCA - Blood,        1) pregnancy at 11w3d   Discussed and ordered NIPT   2) + chlamydia at intake  MAX today for Chlamydia   3) Hx of Chronic HTN   Off meds, Baseline labs good   Starting ASA 81 mg   4) Hx of GDM  Baseline good, testing 26-28 weeks      Plan    Reviewed this stage of pregnancy  Problem list updated   Follow up in 2 weeks    Gary Rosales MD   2023  16:03 EDT

## 2023-09-07 ENCOUNTER — TELEPHONE (OUTPATIENT)
Dept: OBSTETRICS AND GYNECOLOGY | Facility: CLINIC | Age: 23
End: 2023-09-07
Payer: COMMERCIAL

## 2023-09-07 LAB
C TRACH RRNA SPEC QL NAA+PROBE: NEGATIVE
N GONORRHOEA RRNA SPEC QL NAA+PROBE: NEGATIVE
T VAGINALIS RRNA SPEC QL NAA+PROBE: NEGATIVE

## 2023-09-07 NOTE — TELEPHONE ENCOUNTER
----- Message from Gary Rosaels MD sent at 9/7/2023 10:36 AM EDT -----  Oly, please let her know the vaginal culture STD screen for Chlamydia, Gonorrhea and trichomoniasis is negative. Thanks, Dr. Rosales

## 2023-09-11 ENCOUNTER — TELEPHONE (OUTPATIENT)
Dept: OBSTETRICS AND GYNECOLOGY | Facility: CLINIC | Age: 23
End: 2023-09-11
Payer: COMMERCIAL

## 2023-09-11 NOTE — TELEPHONE ENCOUNTER
----- Message from Gary Rosales MD sent at 9/11/2023  9:57 AM EDT -----  Oly, We did NIPT for aneuploidy screen, most commonly down syndrome. This screening test was negative for concerns with chromosomal balance in chromosomes 13,18, 21, X & Y. If she wants to know gender, that is available on the test. Thanks Dr. Rosales

## 2023-10-03 ENCOUNTER — ROUTINE PRENATAL (OUTPATIENT)
Dept: OBSTETRICS AND GYNECOLOGY | Facility: CLINIC | Age: 23
End: 2023-10-03
Payer: COMMERCIAL

## 2023-10-03 VITALS — BODY MASS INDEX: 19.75 KG/M2 | SYSTOLIC BLOOD PRESSURE: 117 MMHG | WEIGHT: 108 LBS | DIASTOLIC BLOOD PRESSURE: 70 MMHG

## 2023-10-03 DIAGNOSIS — Z34.82 ENCOUNTER FOR SUPERVISION OF OTHER NORMAL PREGNANCY IN SECOND TRIMESTER: Primary | ICD-10-CM

## 2023-10-03 DIAGNOSIS — O09.292 HISTORY OF GESTATIONAL DIABETES IN PRIOR PREGNANCY, CURRENTLY PREGNANT IN SECOND TRIMESTER: ICD-10-CM

## 2023-10-03 DIAGNOSIS — Z86.32 HISTORY OF GESTATIONAL DIABETES IN PRIOR PREGNANCY, CURRENTLY PREGNANT IN SECOND TRIMESTER: ICD-10-CM

## 2023-10-03 DIAGNOSIS — A56.8 CHLAMYDIA TRACHOMATIS INFECTION IN MOTHER DURING FIRST TRIMESTER OF PREGNANCY: ICD-10-CM

## 2023-10-03 DIAGNOSIS — O98.311 CHLAMYDIA TRACHOMATIS INFECTION IN MOTHER DURING FIRST TRIMESTER OF PREGNANCY: ICD-10-CM

## 2023-10-03 DIAGNOSIS — Z36.89 ENCOUNTER FOR FETAL ANATOMIC SURVEY: ICD-10-CM

## 2023-10-03 DIAGNOSIS — O10.012 PRE-EXISTING ESSENTIAL HYPERTENSION DURING PREGNANCY IN SECOND TRIMESTER: ICD-10-CM

## 2023-10-03 LAB
GLUCOSE UR STRIP-MCNC: NEGATIVE MG/DL
PROT UR STRIP-MCNC: NEGATIVE MG/DL

## 2023-10-03 RX ORDER — ASPIRIN 81 MG/1
81 TABLET, CHEWABLE ORAL DAILY
Qty: 30 TABLET | Refills: 7 | Status: SHIPPED | OUTPATIENT
Start: 2023-10-03

## 2023-10-03 NOTE — PROGRESS NOTES
OB follow up     Anaya Conrad is a 23 y.o.  15w3d being seen today for her obstetrical visit.  Patient reports no complaints. Fetal movement: normal.    Her prenatal care is complicated by (and status): chronic HTN, hx of GDM, + chlamydia     Review of Systems  Cramping/contractions : none   Vaginal bleeding: none   Fetal movement present     /70   Wt 49 kg (108 lb)   LMP 2023 (Approximate)   BMI 19.75 kg/m²     Prenatal Assessment  Fetal Heart Rate: 156  Fundal Height (cm): 15 cm  Movement: Present  Edema  LLE Edema: None  RLE Edema: None  Facial Edema: None        Assessment    Diagnoses and all orders for this visit:    1. Encounter for supervision of other normal pregnancy in second trimester (Primary)  -     POC Urinalysis Dipstick    2. Pre-existing essential hypertension during pregnancy in second trimester    3. History of gestational diabetes in prior pregnancy, currently pregnant in second trimester    4. Chlamydia trachomatis infection in mother during first trimester of pregnancy    5. Encounter for fetal anatomic survey  -     US Ob 14 + Weeks Single or First Gestation; Future    Other orders  -     aspirin 81 MG chewable tablet; Chew 1 tablet Daily.  Dispense: 30 tablet; Refill: 7        1) pregnancy at 15w3d   Quickening reviewed   Discussed NIPT, screening was negative  Anatomy scan for next visit at 20-21 wks   Flu shot recommended  2) Chronic HTN  No meds  Starting ASA (sent again)   Baseline testing normal - Normal BP today   3) Hx of gestational DM  Retest at 26-28 weeks   4) + chlamydia in early pregnancy   MAX negative last visit.       Plan    Reviewed this stage of pregnancy  Problem list updated   Follow up in 5 weeks    Gary Rosales MD   10/3/2023  15:53 EDT

## 2023-11-07 ENCOUNTER — ROUTINE PRENATAL (OUTPATIENT)
Dept: OBSTETRICS AND GYNECOLOGY | Facility: CLINIC | Age: 23
End: 2023-11-07
Payer: COMMERCIAL

## 2023-11-07 VITALS — WEIGHT: 111 LBS | DIASTOLIC BLOOD PRESSURE: 64 MMHG | SYSTOLIC BLOOD PRESSURE: 102 MMHG | BODY MASS INDEX: 20.3 KG/M2

## 2023-11-07 DIAGNOSIS — O10.012 PRE-EXISTING ESSENTIAL HYPERTENSION DURING PREGNANCY IN SECOND TRIMESTER: ICD-10-CM

## 2023-11-07 DIAGNOSIS — O44.02 PLACENTA PREVIA WITHOUT HEMORRHAGE IN SECOND TRIMESTER: ICD-10-CM

## 2023-11-07 DIAGNOSIS — O98.311 CHLAMYDIA TRACHOMATIS INFECTION IN MOTHER DURING FIRST TRIMESTER OF PREGNANCY: ICD-10-CM

## 2023-11-07 DIAGNOSIS — Z86.32 HISTORY OF GESTATIONAL DIABETES IN PRIOR PREGNANCY, CURRENTLY PREGNANT IN SECOND TRIMESTER: ICD-10-CM

## 2023-11-07 DIAGNOSIS — O09.292 HISTORY OF GESTATIONAL DIABETES IN PRIOR PREGNANCY, CURRENTLY PREGNANT IN SECOND TRIMESTER: ICD-10-CM

## 2023-11-07 DIAGNOSIS — Z34.82 ENCOUNTER FOR SUPERVISION OF OTHER NORMAL PREGNANCY IN SECOND TRIMESTER: Primary | ICD-10-CM

## 2023-11-07 DIAGNOSIS — A56.8 CHLAMYDIA TRACHOMATIS INFECTION IN MOTHER DURING FIRST TRIMESTER OF PREGNANCY: ICD-10-CM

## 2023-11-07 LAB
GLUCOSE UR STRIP-MCNC: NEGATIVE MG/DL
PROT UR STRIP-MCNC: NEGATIVE MG/DL

## 2023-11-07 NOTE — PROGRESS NOTES
OB follow up     Anaya Conrad is a 23 y.o.  20w3d being seen today for her obstetrical visit.  Patient reports fatigue. Fetal movement: normal.    Her prenatal care is complicated by (and status): chronic HTN, hx of GDM, + chlamydia     Review of Systems  Cramping/contractions : none   Vaginal bleeding: none   Fetal movement good     /64   Wt 50.3 kg (111 lb)   LMP 2023 (Approximate)   BMI 20.30 kg/m²     Prenatal Assessment  Fetal Heart Rate: 134  Fundal Height (cm): 20 cm  Movement: Present  Edema  LLE Edema: None  RLE Edema: None  Facial Edema: None        Assessment    Diagnoses and all orders for this visit:    1. Encounter for supervision of other normal pregnancy in second trimester (Primary)  -     POC Urinalysis Dipstick    2. Pre-existing essential hypertension during pregnancy in second trimester    3. History of gestational diabetes in prior pregnancy, currently pregnant in second trimester    4. Chlamydia trachomatis infection in mother during first trimester of pregnancy    5. Placenta previa without hemorrhage in second trimester        1) pregnancy at 20w3d   Anatomy scan and cervical length reassuring   2) Marginal posterior placenta previa   No bleeding, Rh+   Pelvic rest and no strenuous activity   To OB ED with any bleeding > spotting   Call for any bleeding.   3) Chronic HTN  Normal BP today, no meds  Normal baseline testing   On ASA   4) hx of gestational DM  Retest at 26-28 weeks  5) + Chlamydia in early pregnancy   Treated. But retest third trimester       Plan    Reviewed this stage of pregnancy  Problem list updated   Follow up in 4 weeks    Gary Rosales MD   2023  15:01 EST

## 2023-12-05 ENCOUNTER — ROUTINE PRENATAL (OUTPATIENT)
Dept: OBSTETRICS AND GYNECOLOGY | Facility: CLINIC | Age: 23
End: 2023-12-05
Payer: COMMERCIAL

## 2023-12-05 VITALS — SYSTOLIC BLOOD PRESSURE: 127 MMHG | BODY MASS INDEX: 22.13 KG/M2 | WEIGHT: 121 LBS | DIASTOLIC BLOOD PRESSURE: 71 MMHG

## 2023-12-05 DIAGNOSIS — A56.8 CHLAMYDIA TRACHOMATIS INFECTION IN MOTHER DURING FIRST TRIMESTER OF PREGNANCY: ICD-10-CM

## 2023-12-05 DIAGNOSIS — O09.292 HISTORY OF GESTATIONAL DIABETES IN PRIOR PREGNANCY, CURRENTLY PREGNANT IN SECOND TRIMESTER: ICD-10-CM

## 2023-12-05 DIAGNOSIS — Z34.82 ENCOUNTER FOR SUPERVISION OF OTHER NORMAL PREGNANCY IN SECOND TRIMESTER: Primary | ICD-10-CM

## 2023-12-05 DIAGNOSIS — Z86.32 HISTORY OF GESTATIONAL DIABETES IN PRIOR PREGNANCY, CURRENTLY PREGNANT IN SECOND TRIMESTER: ICD-10-CM

## 2023-12-05 DIAGNOSIS — O10.012 PRE-EXISTING ESSENTIAL HYPERTENSION DURING PREGNANCY IN SECOND TRIMESTER: ICD-10-CM

## 2023-12-05 DIAGNOSIS — Z36.9 ANTENATAL SCREENING ENCOUNTER: ICD-10-CM

## 2023-12-05 DIAGNOSIS — O44.02 PLACENTA PREVIA WITHOUT HEMORRHAGE IN SECOND TRIMESTER: ICD-10-CM

## 2023-12-05 DIAGNOSIS — O98.311 CHLAMYDIA TRACHOMATIS INFECTION IN MOTHER DURING FIRST TRIMESTER OF PREGNANCY: ICD-10-CM

## 2023-12-05 LAB
GLUCOSE UR STRIP-MCNC: NEGATIVE MG/DL
PROT UR STRIP-MCNC: NEGATIVE MG/DL

## 2023-12-05 NOTE — PROGRESS NOTES
OB follow up     Anaya Conrad is a 23 y.o.  24w3d being seen today for her obstetrical visit.  Patient reports  restless at night, pain in her legs at night . Fetal movement: normal.    Her prenatal care is complicated by (and status): Chronic HTN, Hx of GDM, Hx of chlamydia, Marginal previa     Review of Systems  Cramping/contractions : none   Vaginal bleeding: none   Fetal movement good     /71   Wt 54.9 kg (121 lb)   LMP 2023 (Approximate)   BMI 22.13 kg/m²     Prenatal Assessment  Fetal Heart Rate: 154  Fundal Height (cm): 23 cm  Movement: Present  Edema  LLE Edema: None  RLE Edema: None  Facial Edema: None        Assessment    Diagnoses and all orders for this visit:    1. Encounter for supervision of other normal pregnancy in second trimester (Primary)  -     POC Urinalysis Dipstick    2. Pre-existing essential hypertension during pregnancy in second trimester    3. History of gestational diabetes in prior pregnancy, currently pregnant in second trimester    4. Chlamydia trachomatis infection in mother during first trimester of pregnancy    5. Placenta previa without hemorrhage in second trimester  -     US Ob Follow Up Transabdominal Approach; Future    6.  screening encounter  -     Gestational Screen 1 Hr ([facility]); Future  -     CBC & Differential; Future        1) pregnancy at 24w3d   Rh+,  GTT/CBC ordered   2) HTN, chronic   No meds, BP tood   Normal baseline testing  On ASA   3) Hx of GDM  Screening upcoming   4) Hx of chlamydia in early pregnancy   Treated - consider rescreen closer to delivery   5) Marginal previa   No bleeding   Rh+   Pelvic rest and no strenuous activity   Rescan next visit to reassess     Plan    Reviewed this stage of pregnancy  Problem list updated   Follow up in 2 weeks    Gary Rosales MD   2023  16:28 EST

## 2023-12-19 ENCOUNTER — ROUTINE PRENATAL (OUTPATIENT)
Dept: OBSTETRICS AND GYNECOLOGY | Facility: CLINIC | Age: 23
End: 2023-12-19
Payer: COMMERCIAL

## 2023-12-19 VITALS — DIASTOLIC BLOOD PRESSURE: 70 MMHG | WEIGHT: 123 LBS | SYSTOLIC BLOOD PRESSURE: 115 MMHG | BODY MASS INDEX: 22.5 KG/M2

## 2023-12-19 DIAGNOSIS — O10.012 PRE-EXISTING ESSENTIAL HYPERTENSION DURING PREGNANCY IN SECOND TRIMESTER: ICD-10-CM

## 2023-12-19 DIAGNOSIS — O09.292 HISTORY OF GESTATIONAL DIABETES IN PRIOR PREGNANCY, CURRENTLY PREGNANT IN SECOND TRIMESTER: ICD-10-CM

## 2023-12-19 DIAGNOSIS — Z86.32 HISTORY OF GESTATIONAL DIABETES IN PRIOR PREGNANCY, CURRENTLY PREGNANT IN SECOND TRIMESTER: ICD-10-CM

## 2023-12-19 DIAGNOSIS — A56.8 CHLAMYDIA TRACHOMATIS INFECTION IN MOTHER DURING FIRST TRIMESTER OF PREGNANCY: ICD-10-CM

## 2023-12-19 DIAGNOSIS — O98.311 CHLAMYDIA TRACHOMATIS INFECTION IN MOTHER DURING FIRST TRIMESTER OF PREGNANCY: ICD-10-CM

## 2023-12-19 DIAGNOSIS — Z34.82 ENCOUNTER FOR SUPERVISION OF OTHER NORMAL PREGNANCY IN SECOND TRIMESTER: Primary | ICD-10-CM

## 2023-12-19 LAB
GLUCOSE UR STRIP-MCNC: NEGATIVE MG/DL
PROT UR STRIP-MCNC: NEGATIVE MG/DL

## 2023-12-19 NOTE — PROGRESS NOTES
OB follow up     Anaya Conrad is a 23 y.o.  26w3d being seen today for her obstetrical visit.  Patient reports no complaints. Fetal movement: normal.    Her prenatal care is complicated by (and status): hx chronic HTN, Hx of GDM and hx of chlamydia     Review of Systems  Cramping/contractions : none   Vaginal bleeding: none   Fetal movement good     /70   Wt 55.8 kg (123 lb)   LMP 2023 (Approximate)   BMI 22.50 kg/m²     Prenatal Assessment  Fetal Heart Rate: 154  Fundal Height (cm): 24 cm  Movement: Present  Presentation: Vertex  Edema  LLE Edema: None  RLE Edema: None  Facial Edema: None        Assessment    Diagnoses and all orders for this visit:    1. Encounter for supervision of other normal pregnancy in second trimester (Primary)  -     POC Urinalysis Dipstick    2. Pre-existing essential hypertension during pregnancy in second trimester    3. History of gestational diabetes in prior pregnancy, currently pregnant in second trimester    4. Chlamydia trachomatis infection in mother during first trimester of pregnancy        1) pregnancy at 26w3d   Rh+, doing anemia/GDM screen today with CBC/1 hour   Reviewed next trimester in detail including but not limited to finding pediatrician, looking into prenatal classes and hospital tours.   Encouraged TDaP booster and flu shot in pregnancy and why  RSV also reviewed along with timing   Encouraged to consider questions regarding L&D, anesthesia, breast feeding and birth control    2) marginal previa - RESOLVED   Ultrasound today AGA - Cephalic, no previa - normal MVP   3) Hx Chronic HTN  On ASA, Normal BP today, no meds  Baseline testing on file   4) hx of GDM  Screening today   5) hx of chlamydia in earlier pregnancy   Treated   Considering rescreen closer to delivery       Plan    Reviewed this stage of pregnancy  Problem list updated   Follow up in 2 weeks    Gary Rosales MD   2023  12:18 EST

## 2023-12-20 ENCOUNTER — TELEPHONE (OUTPATIENT)
Dept: OBSTETRICS AND GYNECOLOGY | Facility: CLINIC | Age: 23
End: 2023-12-20
Payer: COMMERCIAL

## 2023-12-20 NOTE — TELEPHONE ENCOUNTER
----- Message from Gary Rosales MD sent at 12/20/2023 10:45 AM EST -----  Oly, failed one hour. So needs to do 3 hour. Order in epic, please let her know and help arrange. Thanks, Dr. Rosales

## 2024-01-02 ENCOUNTER — ROUTINE PRENATAL (OUTPATIENT)
Dept: OBSTETRICS AND GYNECOLOGY | Facility: CLINIC | Age: 24
End: 2024-01-02
Payer: COMMERCIAL

## 2024-01-02 VITALS — DIASTOLIC BLOOD PRESSURE: 63 MMHG | BODY MASS INDEX: 22.13 KG/M2 | WEIGHT: 121 LBS | SYSTOLIC BLOOD PRESSURE: 116 MMHG

## 2024-01-02 DIAGNOSIS — Z36.9 ANTENATAL SCREENING ENCOUNTER: ICD-10-CM

## 2024-01-02 DIAGNOSIS — Z34.83 ENCOUNTER FOR SUPERVISION OF OTHER NORMAL PREGNANCY IN THIRD TRIMESTER: Primary | ICD-10-CM

## 2024-01-02 DIAGNOSIS — A56.8 CHLAMYDIA TRACHOMATIS INFECTION IN MOTHER DURING THIRD TRIMESTER OF PREGNANCY: ICD-10-CM

## 2024-01-02 DIAGNOSIS — O10.013 PRE-EXISTING ESSENTIAL HYPERTENSION DURING PREGNANCY IN THIRD TRIMESTER: ICD-10-CM

## 2024-01-02 DIAGNOSIS — O98.313 CHLAMYDIA TRACHOMATIS INFECTION IN MOTHER DURING THIRD TRIMESTER OF PREGNANCY: ICD-10-CM

## 2024-01-02 LAB
GLUCOSE UR STRIP-MCNC: NEGATIVE MG/DL
PROT UR STRIP-MCNC: NEGATIVE MG/DL

## 2024-01-02 NOTE — PROGRESS NOTES
OB follow up     Anaya Conrad is a 23 y.o.  28w3d being seen today for her obstetrical visit.  Patient reports passed out recently.  . Fetal movement: normal.    Her prenatal care is complicated by (and status): hx of HTN, Hx of chlamydia     Review of Systems  Cramping/contractions : none   Vaginal bleeding: none   Fetal movement good     /63   Wt 54.9 kg (121 lb)   LMP 2023 (Approximate)   BMI 22.13 kg/m²     Prenatal Assessment  Fetal Heart Rate: 156  Fundal Height (cm): 26 cm  Movement: Present  Edema  LLE Edema: Trace  RLE Edema: Trace  Facial Edema: None        Assessment    Diagnoses and all orders for this visit:    1. Encounter for supervision of other normal pregnancy in third trimester (Primary)  -     POC Urinalysis Dipstick    2. Pre-existing essential hypertension during pregnancy in third trimester    3. Chlamydia trachomatis infection in mother during third trimester of pregnancy    4.  screening encounter  -     RPR        1) pregnancy at 28w3d   Failed one hour, doing 3 hr GTT- hx of GDM- expectations reviewed.   Rh+  Still needs CBC and RPR (new request from State)   2) Hx of chronic HTN- no elevation this pregnancy, currently no medication   On ASA  BP remains good.   Normal baseline testing   Continue to follow trend  3) Hx of chlamydia   Treated with negative test of cure.         Plan    Reviewed this stage of pregnancy  Problem list updated   Follow up in 2 weeks    Gary Rosales MD   2024  10:14 EST   
normal...

## 2024-01-03 ENCOUNTER — TELEPHONE (OUTPATIENT)
Dept: OBSTETRICS AND GYNECOLOGY | Facility: CLINIC | Age: 24
End: 2024-01-03
Payer: COMMERCIAL

## 2024-01-03 LAB
GLUCOSE 1H P 100 G GLC PO SERPL-MCNC: 154 MG/DL (ref 65–179)
GLUCOSE 2H P 100 G GLC PO SERPL-MCNC: 191 MG/DL (ref 65–154)
GLUCOSE 3H P 100 G GLC PO SERPL-MCNC: 113 MG/DL (ref 65–139)
GLUCOSE P FAST SERPL-MCNC: 77 MG/DL (ref 65–94)

## 2024-01-03 NOTE — TELEPHONE ENCOUNTER
----- Message from Gary Rosales MD sent at 1/3/2024  9:10 AM EST -----  Oly, she did pass her 3 hour with only one elevated. Please let her know this is good but avoid sweats like cookies and candy ideally. Thanks, Dr. Rosales

## 2024-01-23 ENCOUNTER — ROUTINE PRENATAL (OUTPATIENT)
Dept: OBSTETRICS AND GYNECOLOGY | Facility: CLINIC | Age: 24
End: 2024-01-23
Payer: COMMERCIAL

## 2024-01-23 VITALS — SYSTOLIC BLOOD PRESSURE: 114 MMHG | WEIGHT: 127 LBS | DIASTOLIC BLOOD PRESSURE: 71 MMHG | BODY MASS INDEX: 23.23 KG/M2

## 2024-01-23 DIAGNOSIS — Z34.83 ENCOUNTER FOR SUPERVISION OF OTHER NORMAL PREGNANCY IN THIRD TRIMESTER: Primary | ICD-10-CM

## 2024-01-23 DIAGNOSIS — A56.8 CHLAMYDIA TRACHOMATIS INFECTION IN MOTHER DURING THIRD TRIMESTER OF PREGNANCY: ICD-10-CM

## 2024-01-23 DIAGNOSIS — O10.013 PRE-EXISTING ESSENTIAL HYPERTENSION DURING PREGNANCY IN THIRD TRIMESTER: ICD-10-CM

## 2024-01-23 DIAGNOSIS — O26.843 FUNDAL HEIGHT LOW FOR DATES IN THIRD TRIMESTER: ICD-10-CM

## 2024-01-23 DIAGNOSIS — O98.313 CHLAMYDIA TRACHOMATIS INFECTION IN MOTHER DURING THIRD TRIMESTER OF PREGNANCY: ICD-10-CM

## 2024-01-23 LAB
GLUCOSE UR STRIP-MCNC: NEGATIVE MG/DL
PROT UR STRIP-MCNC: ABNORMAL MG/DL

## 2024-01-23 NOTE — PROGRESS NOTES
OB follow up     Anaya Conrad is a 23 y.o.  31w3d being seen today for her obstetrical visit.  Patient reports  restless . Fetal movement: normal.    Her prenatal care is complicated by (and status): hx of HTN, hx of chlamydia     Review of Systems  Cramping/contractions : none   Vaginal bleeding: none   Fetal movement good     /71   Wt 57.6 kg (127 lb)   LMP 2023 (Approximate)   BMI 23.23 kg/m²     Prenatal Assessment  Fetal Heart Rate: 143  Fundal Height (cm): 28 cm  Movement: Present  Edema  LLE Edema: Trace  RLE Edema: Trace  Facial Edema: None        Assessment    Diagnoses and all orders for this visit:    1. Encounter for supervision of other normal pregnancy in third trimester (Primary)  -     POC Urinalysis Dipstick    2. Pre-existing essential hypertension during pregnancy in third trimester    3. Chlamydia trachomatis infection in mother during third trimester of pregnancy    4. Fundal height low for dates in third trimester  -     US Ob Follow Up Transabdominal Approach        1) pregnancy at 31w3d   2) Hx of chronic HTN- no elevation this pregnancy, currently no medication   On ASA  BP remains good.   Normal baseline testing   Continue to follow trend  3) Hx of chlamydia   Treated with negative test of cure.   4) FH low   Check growth next visit.        Plan    Reviewed this stage of pregnancy  Problem list updated   Follow up in 2 weeks with growth     Gary Rosales MD   2024  16:08 EST

## 2024-01-25 ENCOUNTER — TELEPHONE (OUTPATIENT)
Dept: OBSTETRICS AND GYNECOLOGY | Facility: CLINIC | Age: 24
End: 2024-01-25
Payer: COMMERCIAL

## 2024-01-25 DIAGNOSIS — O99.350 RESTLESS LEG SYNDROME IN PREGNANCY: Primary | ICD-10-CM

## 2024-01-25 DIAGNOSIS — G25.81 RESTLESS LEG SYNDROME IN PREGNANCY: Primary | ICD-10-CM

## 2024-01-25 RX ORDER — HYDROXYZINE PAMOATE 25 MG/1
25 CAPSULE ORAL 4 TIMES DAILY PRN
Qty: 120 CAPSULE | Refills: 4 | Status: SHIPPED | OUTPATIENT
Start: 2024-01-25

## 2024-01-25 NOTE — TELEPHONE ENCOUNTER
Theresa,     I reviewed restless leg in pregnancy in up to date. They suggest Clonazpam (benzodiazapine- and I would not recommend) or Carbidopa-levadopa - that is not a medication I am comfortable with in pregnancy either     So I am asking neurology to see her and offer suggestions. Also sent vistaril to try and see if that will help    Please let her know and help with the referral.     Thanks,   Dr. Rosales     Pt states she has very bed restless leg. Say it is painful, legs fill tingly, cannot get any relief when standing/sitting.   Pt did say shower helps, also will take tylenol for pain.   She would like to know if provider has anymore suggestions for relief.     Please advise,   Thank you

## 2024-02-01 ENCOUNTER — TELEPHONE (OUTPATIENT)
Dept: OBSTETRICS AND GYNECOLOGY | Facility: CLINIC | Age: 24
End: 2024-02-01
Payer: COMMERCIAL

## 2024-02-01 NOTE — TELEPHONE ENCOUNTER
Eugenia     That sounds like a proof of pregnancy letter. They should be able to get that without issue. Not sure it has to include fetal sex, but you can always add for her if she desires.     Thanks,   Dr. Rosales    Pt called asking if I could write her a letter stating that she is pregnant, her due date and the sex of the baby for section 8 housing. Is this okay?    Please advise, thank you

## 2024-02-06 ENCOUNTER — ROUTINE PRENATAL (OUTPATIENT)
Dept: OBSTETRICS AND GYNECOLOGY | Facility: CLINIC | Age: 24
End: 2024-02-06
Payer: COMMERCIAL

## 2024-02-06 VITALS — WEIGHT: 130 LBS | BODY MASS INDEX: 23.78 KG/M2 | DIASTOLIC BLOOD PRESSURE: 63 MMHG | SYSTOLIC BLOOD PRESSURE: 117 MMHG

## 2024-02-06 DIAGNOSIS — O26.843 FUNDAL HEIGHT LOW FOR DATES IN THIRD TRIMESTER: ICD-10-CM

## 2024-02-06 DIAGNOSIS — G25.81 RESTLESS LEG SYNDROME IN PREGNANCY: ICD-10-CM

## 2024-02-06 DIAGNOSIS — O99.350 RESTLESS LEG SYNDROME IN PREGNANCY: ICD-10-CM

## 2024-02-06 DIAGNOSIS — Z34.83 ENCOUNTER FOR SUPERVISION OF OTHER NORMAL PREGNANCY IN THIRD TRIMESTER: Primary | ICD-10-CM

## 2024-02-06 DIAGNOSIS — O10.013 PRE-EXISTING ESSENTIAL HYPERTENSION DURING PREGNANCY IN THIRD TRIMESTER: ICD-10-CM

## 2024-02-06 DIAGNOSIS — O98.313 CHLAMYDIA TRACHOMATIS INFECTION IN MOTHER DURING THIRD TRIMESTER OF PREGNANCY: ICD-10-CM

## 2024-02-06 DIAGNOSIS — A56.8 CHLAMYDIA TRACHOMATIS INFECTION IN MOTHER DURING THIRD TRIMESTER OF PREGNANCY: ICD-10-CM

## 2024-02-06 LAB
GLUCOSE UR STRIP-MCNC: NEGATIVE MG/DL
PROT UR STRIP-MCNC: NEGATIVE MG/DL

## 2024-02-06 RX ORDER — AMOXICILLIN 500 MG/1
500 TABLET, FILM COATED ORAL
COMMUNITY
Start: 2024-02-02 | End: 2024-02-12

## 2024-02-06 NOTE — PROGRESS NOTES
OB follow up     Anaya Conrad is a 23 y.o.  33w3d being seen today for her obstetrical visit.  Patient reports no complaints. Fetal movement: normal.    Her prenatal care is complicated by (and status): hx of HTN, Hx of chlamydia     Review of Systems  Cramping/contractions : none   Vaginal bleeding: none   Fetal movement good     /63   Wt 59 kg (130 lb)   LMP 2023 (Approximate)   BMI 23.78 kg/m²     Prenatal Assessment  Fetal Heart Rate: 154  Fundal Height (cm): 29 cm  Movement: Present  Presentation: Vertex  Edema  LLE Edema: Trace  RLE Edema: Trace  Facial Edema: None        Assessment    Diagnoses and all orders for this visit:    1. Encounter for supervision of other normal pregnancy in third trimester (Primary)  -     POC Urinalysis Dipstick    2. Pre-existing essential hypertension during pregnancy in third trimester    3. Chlamydia trachomatis infection in mother during third trimester of pregnancy    4. Fundal height low for dates in third trimester    5. Restless leg syndrome in pregnancy        1) pregnancy at 33w3d   RSV recommended now   2) Hx of chronic HTN- no elevation this pregnancy, currently no medication   On ASA  BP remains good.   Normal baseline testing   Continue to follow trend  3) Hx of chlamydia   Treated with negative test of cure.   4) FH low   AGA 46%ile, A/C 43%ile, normal TOM and cephalic   5) Restless leg   Using vistaril as needed and is improved       Plan    Reviewed this stage of pregnancy  Problem list updated   Follow up in 2 weeks    Gary Rosales MD   2024  16:12 EST

## 2024-02-13 ENCOUNTER — TELEPHONE (OUTPATIENT)
Dept: OBSTETRICS AND GYNECOLOGY | Facility: CLINIC | Age: 24
End: 2024-02-13
Payer: COMMERCIAL

## 2024-02-14 ENCOUNTER — HOSPITAL ENCOUNTER (EMERGENCY)
Facility: HOSPITAL | Age: 24
Discharge: HOME | End: 2024-02-14
Attending: EMERGENCY MEDICINE | Admitting: EMERGENCY MEDICINE
Payer: COMMERCIAL

## 2024-02-14 ENCOUNTER — TELEPHONE (OUTPATIENT)
Dept: OBSTETRICS AND GYNECOLOGY | Facility: CLINIC | Age: 24
End: 2024-02-14
Payer: COMMERCIAL

## 2024-02-14 VITALS
DIASTOLIC BLOOD PRESSURE: 84 MMHG | SYSTOLIC BLOOD PRESSURE: 121 MMHG | TEMPERATURE: 97.7 F | RESPIRATION RATE: 18 BRPM | OXYGEN SATURATION: 100 % | HEART RATE: 77 BPM

## 2024-02-14 DIAGNOSIS — Z3A.34 34 WEEKS GESTATION OF PREGNANCY: ICD-10-CM

## 2024-02-14 DIAGNOSIS — U07.1 COVID-19: Primary | ICD-10-CM

## 2024-02-14 LAB
ALBUMIN SERPL-MCNC: 3.3 G/DL (ref 3.5–5.2)
ALBUMIN/GLOB SERPL: 1.7 G/DL
ALP SERPL-CCNC: 147 U/L (ref 39–117)
ALT SERPL W P-5'-P-CCNC: 13 U/L (ref 1–33)
ANION GAP SERPL CALCULATED.3IONS-SCNC: 10 MMOL/L (ref 5–15)
AST SERPL-CCNC: 14 U/L (ref 1–32)
BASOPHILS # BLD AUTO: 0.02 10*3/MM3 (ref 0–0.2)
BASOPHILS NFR BLD AUTO: 0.3 % (ref 0–1.5)
BILIRUB SERPL-MCNC: <0.2 MG/DL (ref 0–1.2)
BUN SERPL-MCNC: 6 MG/DL (ref 6–20)
BUN/CREAT SERPL: 14.6 (ref 7–25)
CALCIUM SPEC-SCNC: 8.8 MG/DL (ref 8.6–10.5)
CHLORIDE SERPL-SCNC: 106 MMOL/L (ref 98–107)
CO2 SERPL-SCNC: 21 MMOL/L (ref 22–29)
CREAT SERPL-MCNC: 0.41 MG/DL (ref 0.57–1)
DEPRECATED RDW RBC AUTO: 38.2 FL (ref 37–54)
EGFRCR SERPLBLD CKD-EPI 2021: 140.2 ML/MIN/1.73
EOSINOPHIL # BLD AUTO: 0.23 10*3/MM3 (ref 0–0.4)
EOSINOPHIL NFR BLD AUTO: 2.9 % (ref 0.3–6.2)
ERYTHROCYTE [DISTWIDTH] IN BLOOD BY AUTOMATED COUNT: 13.1 % (ref 12.3–15.4)
GLOBULIN UR ELPH-MCNC: 2 GM/DL
GLUCOSE SERPL-MCNC: 101 MG/DL (ref 65–99)
HCT VFR BLD AUTO: 30.7 % (ref 34–46.6)
HGB BLD-MCNC: 10.1 G/DL (ref 12–15.9)
IMM GRANULOCYTES # BLD AUTO: 0.07 10*3/MM3 (ref 0–0.05)
IMM GRANULOCYTES NFR BLD AUTO: 0.9 % (ref 0–0.5)
LYMPHOCYTES # BLD AUTO: 1.86 10*3/MM3 (ref 0.7–3.1)
LYMPHOCYTES NFR BLD AUTO: 23.4 % (ref 19.6–45.3)
MCH RBC QN AUTO: 27.2 PG (ref 26.6–33)
MCHC RBC AUTO-ENTMCNC: 32.9 G/DL (ref 31.5–35.7)
MCV RBC AUTO: 82.7 FL (ref 79–97)
MONOCYTES # BLD AUTO: 0.53 10*3/MM3 (ref 0.1–0.9)
MONOCYTES NFR BLD AUTO: 6.7 % (ref 5–12)
NEUTROPHILS NFR BLD AUTO: 5.23 10*3/MM3 (ref 1.7–7)
NEUTROPHILS NFR BLD AUTO: 65.8 % (ref 42.7–76)
NRBC BLD AUTO-RTO: 0 /100 WBC (ref 0–0.2)
PLATELET # BLD AUTO: 161 10*3/MM3 (ref 140–450)
PMV BLD AUTO: 11.8 FL (ref 6–12)
POTASSIUM SERPL-SCNC: 4 MMOL/L (ref 3.5–5.2)
PROT SERPL-MCNC: 5.3 G/DL (ref 6–8.5)
RBC # BLD AUTO: 3.71 10*6/MM3 (ref 3.77–5.28)
SODIUM SERPL-SCNC: 137 MMOL/L (ref 136–145)
WBC NRBC COR # BLD AUTO: 7.94 10*3/MM3 (ref 3.4–10.8)

## 2024-02-14 PROCEDURE — 93005 ELECTROCARDIOGRAM TRACING: CPT

## 2024-02-14 PROCEDURE — 94760 N-INVAS EAR/PLS OXIMETRY 1: CPT

## 2024-02-14 PROCEDURE — 94640 AIRWAY INHALATION TREATMENT: CPT

## 2024-02-14 PROCEDURE — 85025 COMPLETE CBC W/AUTO DIFF WBC: CPT | Performed by: EMERGENCY MEDICINE

## 2024-02-14 PROCEDURE — 99283 EMERGENCY DEPT VISIT LOW MDM: CPT

## 2024-02-14 PROCEDURE — 80053 COMPREHEN METABOLIC PANEL: CPT | Performed by: EMERGENCY MEDICINE

## 2024-02-14 PROCEDURE — 94799 UNLISTED PULMONARY SVC/PX: CPT

## 2024-02-14 PROCEDURE — 93010 ELECTROCARDIOGRAM REPORT: CPT | Performed by: INTERNAL MEDICINE

## 2024-02-14 RX ORDER — ALBUTEROL SULFATE 2.5 MG/3ML
2.5 SOLUTION RESPIRATORY (INHALATION) ONCE
Status: COMPLETED | OUTPATIENT
Start: 2024-02-14 | End: 2024-02-14

## 2024-02-14 RX ORDER — ALBUTEROL SULFATE 90 UG/1
2 AEROSOL, METERED RESPIRATORY (INHALATION) EVERY 6 HOURS PRN
Qty: 8 G | Refills: 0 | Status: SHIPPED | OUTPATIENT
Start: 2024-02-14

## 2024-02-14 RX ORDER — SODIUM CHLORIDE 0.9 % (FLUSH) 0.9 %
10 SYRINGE (ML) INJECTION AS NEEDED
Status: DISCONTINUED | OUTPATIENT
Start: 2024-02-14 | End: 2024-02-14 | Stop reason: HOSPADM

## 2024-02-14 RX ORDER — GUAIFENESIN 600 MG/1
600 TABLET, EXTENDED RELEASE ORAL 2 TIMES DAILY
Qty: 20 TABLET | Refills: 0 | Status: SHIPPED | OUTPATIENT
Start: 2024-02-14 | End: 2024-02-20

## 2024-02-14 RX ADMIN — ALBUTEROL SULFATE 2.5 MG: 2.5 SOLUTION RESPIRATORY (INHALATION) at 17:56

## 2024-02-14 NOTE — ED NOTES
Pt to ED with covid + on Monday, pt reports sius congestion. Pt is 34 weeks pregnant. Reports + intermittent nausea.

## 2024-02-14 NOTE — ED TRIAGE NOTES
Pt to ed from home via PV    Pt dx covid on Monday. Today pt c/o increased SOB and congestion. Pt is 34 weeks pregnant. Triage RN called OB on call and was told to see pt down here.

## 2024-02-14 NOTE — ED PROVIDER NOTES
EMERGENCY DEPARTMENT ENCOUNTER  Room Number:  17/17  PCP: Lizabeth Saldana APRN  Independent Historians: Patient      HPI:  Chief Complaint: had concerns including Shortness of Breath.  Cough and congestion    A complete HPI/ROS/PMH/PSH/SH/FH are unobtainable due to: None    Chronic or social conditions impacting patient care (Social Determinants of Health): None      Context: Anaya Conrad is a 23 y.o. female with a medical history of anemia, gestational hypertension and gestational diabetes, G2, P1 at 34 weeks pregnant who presents to the ED c/o acute cough and congestion with shortness of breath.  Patient began feeling ill last Friday with URI symptoms.  She took a COVID test on Monday which was positive.  For the past couple days she has had worsening cough and congestion and shortness of breath.  This is causing some aches and pains throughout the chest and abdomen in various places.  She has not had any vomiting or diarrhea.  She denies any dysuria or hematuria.  She denies any vaginal bleeding or spotting.  She has had no fevers.  Her 2-year-old daughter is at home with her and she is in good health.      Review of prior external notes (non-ED) -and- Review of prior external test results outside of this encounter: I reviewed the OB/GYN progress note from February 6, 2024 when she had continued care for prenatal management.      PAST MEDICAL HISTORY  Active Ambulatory Problems     Diagnosis Date Noted    Gestational diabetes mellitus (GDM) in third trimester 12/30/2020    GBS (group B Streptococcus carrier), +RV culture, currently pregnant 02/18/2021    Pregnancy 03/07/2021    Restless leg syndrome in pregnancy 01/25/2024     Resolved Ambulatory Problems     Diagnosis Date Noted    No Resolved Ambulatory Problems     Past Medical History:   Diagnosis Date    Anemia     Gestational diabetes     Gestational hypertension     Hypertension     Migraines     UTI (urinary tract infection)          PAST  SURGICAL HISTORY  No past surgical history on file.      FAMILY HISTORY  Family History   Problem Relation Age of Onset    Diabetes Mother     Breast cancer Neg Hx     Ovarian cancer Neg Hx     Uterine cancer Neg Hx     Colon cancer Neg Hx     Deep vein thrombosis Neg Hx     Pulmonary embolism Neg Hx          SOCIAL HISTORY  Social History     Socioeconomic History    Marital status: Single   Tobacco Use    Smoking status: Former     Packs/day: 0.10     Years: 1.00     Additional pack years: 0.00     Total pack years: 0.10     Types: Cigarettes     Quit date: 9/7/2020     Years since quitting: 3.4    Smokeless tobacco: Never   Vaping Use    Vaping Use: Never used   Substance and Sexual Activity    Alcohol use: Not Currently    Drug use: No    Sexual activity: Yes     Partners: Male         ALLERGIES  Patient has no known allergies.      REVIEW OF SYSTEMS  Review of Systems  Included in HPI  All systems reviewed and negative except for those discussed in HPI.      PHYSICAL EXAM    I have reviewed the triage vital signs and nursing notes.    ED Triage Vitals   Temp Heart Rate Resp BP SpO2   02/14/24 1721 02/14/24 1721 02/14/24 1726 02/14/24 1755 02/14/24 1721   97.7 °F (36.5 °C) 73 20 121/84 99 %      Temp src Heart Rate Source Patient Position BP Location FiO2 (%)   -- 02/14/24 1756 02/14/24 1756 -- --    Monitor Lying         Physical Exam  GENERAL: alert, appears fatigued but nontoxic, no acute distress  SKIN: Warm, dry, no diaphoresis  HENT: Normocephalic, atraumatic  EYES: no scleral icterus, EOMI  CV: regular rhythm, regular rate  RESPIRATORY: normal effort, lungs clear bilaterally without any wheezes or rales or rhonchi.  Occasional nonproductive cough noted.  ABDOMEN: soft, gravid uterus appropriate for dates.  No peritoneal features elicited anywhere.  Bowel sounds normal.  Fetal movement palpated.  MUSCULOSKELETAL: no deformity, no edema to the lower extremities at all.  No asymmetry.  NEURO: alert, moves  all extremities, follows commands, no deficits      NIH:                                                         PPE: The patient wore: mask and I wore:gloves, eye protection, and N95 mask  throughout the entire patient encounter.    LAB RESULTS  Recent Results (from the past 24 hour(s))   ECG 12 Lead Dyspnea    Collection Time: 02/14/24  5:26 PM   Result Value Ref Range    QT Interval 381 ms    QTC Interval 384 ms   Comprehensive Metabolic Panel    Collection Time: 02/14/24  5:55 PM    Specimen: Blood   Result Value Ref Range    Glucose 101 (H) 65 - 99 mg/dL    BUN 6 6 - 20 mg/dL    Creatinine 0.41 (L) 0.57 - 1.00 mg/dL    Sodium 137 136 - 145 mmol/L    Potassium 4.0 3.5 - 5.2 mmol/L    Chloride 106 98 - 107 mmol/L    CO2 21.0 (L) 22.0 - 29.0 mmol/L    Calcium 8.8 8.6 - 10.5 mg/dL    Total Protein 5.3 (L) 6.0 - 8.5 g/dL    Albumin 3.3 (L) 3.5 - 5.2 g/dL    ALT (SGPT) 13 1 - 33 U/L    AST (SGOT) 14 1 - 32 U/L    Alkaline Phosphatase 147 (H) 39 - 117 U/L    Total Bilirubin <0.2 0.0 - 1.2 mg/dL    Globulin 2.0 gm/dL    A/G Ratio 1.7 g/dL    BUN/Creatinine Ratio 14.6 7.0 - 25.0    Anion Gap 10.0 5.0 - 15.0 mmol/L    eGFR 140.2 >60.0 mL/min/1.73   CBC Auto Differential    Collection Time: 02/14/24  5:55 PM    Specimen: Blood   Result Value Ref Range    WBC 7.94 3.40 - 10.80 10*3/mm3    RBC 3.71 (L) 3.77 - 5.28 10*6/mm3    Hemoglobin 10.1 (L) 12.0 - 15.9 g/dL    Hematocrit 30.7 (L) 34.0 - 46.6 %    MCV 82.7 79.0 - 97.0 fL    MCH 27.2 26.6 - 33.0 pg    MCHC 32.9 31.5 - 35.7 g/dL    RDW 13.1 12.3 - 15.4 %    RDW-SD 38.2 37.0 - 54.0 fl    MPV 11.8 6.0 - 12.0 fL    Platelets 161 140 - 450 10*3/mm3    Neutrophil % 65.8 42.7 - 76.0 %    Lymphocyte % 23.4 19.6 - 45.3 %    Monocyte % 6.7 5.0 - 12.0 %    Eosinophil % 2.9 0.3 - 6.2 %    Basophil % 0.3 0.0 - 1.5 %    Immature Grans % 0.9 (H) 0.0 - 0.5 %    Neutrophils, Absolute 5.23 1.70 - 7.00 10*3/mm3    Lymphocytes, Absolute 1.86 0.70 - 3.10 10*3/mm3    Monocytes, Absolute 0.53  0.10 - 0.90 10*3/mm3    Eosinophils, Absolute 0.23 0.00 - 0.40 10*3/mm3    Basophils, Absolute 0.02 0.00 - 0.20 10*3/mm3    Immature Grans, Absolute 0.07 (H) 0.00 - 0.05 10*3/mm3    nRBC 0.0 0.0 - 0.2 /100 WBC         RADIOLOGY  No Radiology Exams Resulted Within Past 24 Hours      MEDICATIONS GIVEN IN ER  Medications   sodium chloride 0.9 % flush 10 mL (has no administration in time range)   albuterol (PROVENTIL) nebulizer solution 0.083% 2.5 mg/3mL (2.5 mg Nebulization Given 2/14/24 3171)       ORDERS PLACED DURING THIS VISIT:  Orders Placed This Encounter   Procedures    Comprehensive Metabolic Panel    CBC Auto Differential    OB/GYN (on-call MD unless specified)    ECG 12 Lead Dyspnea    Insert Peripheral IV    CBC & Differential         OUTPATIENT MEDICATION MANAGEMENT:  Current Facility-Administered Medications Ordered in Epic   Medication Dose Route Frequency Provider Last Rate Last Admin    sodium chloride 0.9 % flush 10 mL  10 mL Intravenous PRN Jarocho Angeles MD         Current Outpatient Medications Ordered in Epic   Medication Sig Dispense Refill    albuterol sulfate  (90 Base) MCG/ACT inhaler Inhale 2 puffs Every 6 (Six) Hours As Needed for Wheezing. 8 g 0    aspirin 81 MG chewable tablet Chew 1 tablet Daily. 30 tablet 7    guaiFENesin (MUCINEX) 600 MG 12 hr tablet Take 1 tablet by mouth 2 (Two) Times a Day for 10 days. 20 tablet 0    hydrOXYzine pamoate (VISTARIL) 25 MG capsule Take 1 capsule by mouth 4 (Four) Times a Day As Needed (restless leg). 120 capsule 4    Prenatal 28-0.8 MG tablet Take 1 tablet by mouth Daily. Please use formulary or generic, with DHA ideal 90 each 3         PROCEDURES  Procedures            PROGRESS, DATA ANALYSIS, CONSULTS, AND MEDICAL DECISION MAKING  All labs have been independently interpreted by me.  All radiology studies have been reviewed by me. All EKG's have been independently viewed and interpreted by me.  Discussion below represents my analysis of  "pertinent findings related to patient's condition, differential diagnosis, treatment plan and final disposition.    Differential diagnosis includes but is not limited to COVID-19, pneumonia, bronchitis, pneumothorax, pulmonary embolism, preeclampsia.    Clinical Scores:                  ED Course as of 02/14/24 1914 Wed Feb 14, 2024 1814 EKG         EKG time/Interp time: 1726/1730  Rhythm/Rate: Sinus rhythm, 61 bpm  P waves and NC: Present, 145 ms  QRS, axis: 90 ms, normal axis  ST and T waves: No ST segment elevations are present.  Independently interpreted by me contemporaneously with treatment   [LISSA]   1815 Hemoglobin(!): 10.1 [LISSA]   1815 Hematocrit(!): 30.7 [LISSA]   1819 SpO2: 100 %  Overall, patient is nontoxic-appearing.  She is afebrile.  Work of breathing is normal and saturations are normal on room air.  Ordered 1 albuterol nebulized treatment for symptomatic management of her URI symptoms. [LISSA]   1909 I discussed with Dr. Rosales from OB/GYN about this patient.  I reviewed the patient's clinical findings with him over the phone.  I also reviewed the lab test with him over the phone. [LISSA]   1911 Patient's work of breathing remains normal and her saturations remain normal on room air.  I think she is safe to discharge home for continued symptomatic management.  There is no clinical indication for further workup or diagnostics at this time.  I will prescribe an albuterol MDI and also Mucinex for her to continue taking at home for her respiratory symptoms.  Will carefully review with her the usual \"return to ER\" instructions prior to discharge as well.  She is planning to follow-up with her OB doctor next week in the office as scheduled. [LISSA]      ED Course User Index  [LISSA] Jarocho Angeles MD           AS OF 19:14 EST VITALS:    BP - 121/84  HR - 77  TEMP - 97.7 °F (36.5 °C)  O2 SATS - 100%    COMPLEXITY OF CARE  Admission was considered but after careful review of the patient's presentation, physical " examination, diagnostic results, and response to treatment the patient may be safely discharged with outpatient follow-up.      DIAGNOSIS  Final diagnoses:   COVID-19   34 weeks gestation of pregnancy         DISPOSITION  ED Disposition       ED Disposition   Discharge    Condition   Stable    Comment   --                Please note that portions of this document were completed with a voice recognition program.    Note Disclaimer: At [facility], we believe that sharing information builds trust and better relationships. You are receiving this note because you recently visited [facility]. It is possible you will see health information before a provider has talked with you about it. This kind of information can be easy to misunderstand. To help you fully understand what it means for your health, we urge you to discuss this note with your provider.         Jarocho Angeles MD  02/14/24 5201

## 2024-02-15 LAB
QT INTERVAL: 381 MS
QTC INTERVAL: 384 MS

## 2024-02-15 NOTE — DISCHARGE INSTRUCTIONS
May take Tylenol every 8 hours as needed for fevers or pain.  Drink plenty of fluids as tolerated.  Please return to the emergency room for any worsening pain, weakness, fevers, cough, difficulties breathing or any other concerns.

## 2024-02-20 ENCOUNTER — ROUTINE PRENATAL (OUTPATIENT)
Dept: OBSTETRICS AND GYNECOLOGY | Facility: CLINIC | Age: 24
End: 2024-02-20
Payer: COMMERCIAL

## 2024-02-20 VITALS — SYSTOLIC BLOOD PRESSURE: 128 MMHG | WEIGHT: 131 LBS | DIASTOLIC BLOOD PRESSURE: 73 MMHG | BODY MASS INDEX: 23.96 KG/M2

## 2024-02-20 DIAGNOSIS — Z34.83 ENCOUNTER FOR SUPERVISION OF OTHER NORMAL PREGNANCY IN THIRD TRIMESTER: Primary | ICD-10-CM

## 2024-02-20 DIAGNOSIS — A56.8 CHLAMYDIA TRACHOMATIS INFECTION IN MOTHER DURING THIRD TRIMESTER OF PREGNANCY: ICD-10-CM

## 2024-02-20 DIAGNOSIS — G25.81 RESTLESS LEG SYNDROME IN PREGNANCY: ICD-10-CM

## 2024-02-20 DIAGNOSIS — O99.350 RESTLESS LEG SYNDROME IN PREGNANCY: ICD-10-CM

## 2024-02-20 DIAGNOSIS — O10.013 PRE-EXISTING ESSENTIAL HYPERTENSION DURING PREGNANCY IN THIRD TRIMESTER: ICD-10-CM

## 2024-02-20 DIAGNOSIS — O98.313 CHLAMYDIA TRACHOMATIS INFECTION IN MOTHER DURING THIRD TRIMESTER OF PREGNANCY: ICD-10-CM

## 2024-02-20 DIAGNOSIS — O98.513 COVID-19 AFFECTING PREGNANCY IN THIRD TRIMESTER: ICD-10-CM

## 2024-02-20 DIAGNOSIS — U07.1 COVID-19 AFFECTING PREGNANCY IN THIRD TRIMESTER: ICD-10-CM

## 2024-02-20 LAB
GLUCOSE UR STRIP-MCNC: NEGATIVE MG/DL
PROT UR STRIP-MCNC: NEGATIVE MG/DL

## 2024-02-20 NOTE — PROGRESS NOTES
OB follow up     Anaya Conrad is a 23 y.o.  35w3d being seen today for her obstetrical visit.  Patient reports  recent Covid . Fetal movement: normal.    Her prenatal care is complicated by (and status): Hx of HTN, Hx of chlamydia     Review of Systems  Cramping/contractions : none   Vaginal bleeding: none   Fetal movement good     /73   Wt 59.4 kg (131 lb)   LMP 2023 (Approximate)   BMI 23.96 kg/m²     Prenatal Assessment  Fetal Heart Rate: 134  Fundal Height (cm): 31 cm  Movement: Present  Edema  LLE Edema: Trace  RLE Edema: Trace  Facial Edema: None        Assessment    Diagnoses and all orders for this visit:    1. Encounter for supervision of other normal pregnancy in third trimester (Primary)  -     POC Urinalysis Dipstick    2. Pre-existing essential hypertension during pregnancy in third trimester    3. Chlamydia trachomatis infection in mother during third trimester of pregnancy    4. Restless leg syndrome in pregnancy    5. COVID-19 affecting pregnancy in third trimester        1) pregnancy at 35w3d   Discussed [facility] BID   2) Hx of chronic HTN- no elevation this pregnancy, currently no medication   On ASA  BP remains good.   Normal baseline testing   Continue to follow trend  3) Hx of chlamydia   Treated with negative test of cure.  Consider rescreen with GBS next week    4) COVID   Recently - resolved    Doing well, [facility] BID encouraged   5) Restless leg   Using vistaril as needed and is improved       Plan    Reviewed this stage of pregnancy  Problem list updated   Follow up in 1 weeks    Gary Rosales MD   2024  15:31 EST

## 2024-02-27 ENCOUNTER — ROUTINE PRENATAL (OUTPATIENT)
Dept: OBSTETRICS AND GYNECOLOGY | Facility: CLINIC | Age: 24
End: 2024-02-27
Payer: COMMERCIAL

## 2024-02-27 VITALS — WEIGHT: 134 LBS | DIASTOLIC BLOOD PRESSURE: 74 MMHG | BODY MASS INDEX: 24.51 KG/M2 | SYSTOLIC BLOOD PRESSURE: 119 MMHG

## 2024-02-27 DIAGNOSIS — O98.313 CHLAMYDIA TRACHOMATIS INFECTION IN MOTHER DURING THIRD TRIMESTER OF PREGNANCY: ICD-10-CM

## 2024-02-27 DIAGNOSIS — O10.013 PRE-EXISTING ESSENTIAL HYPERTENSION DURING PREGNANCY IN THIRD TRIMESTER: ICD-10-CM

## 2024-02-27 DIAGNOSIS — A56.8 CHLAMYDIA TRACHOMATIS INFECTION IN MOTHER DURING THIRD TRIMESTER OF PREGNANCY: ICD-10-CM

## 2024-02-27 DIAGNOSIS — O99.350 RESTLESS LEG SYNDROME IN PREGNANCY: ICD-10-CM

## 2024-02-27 DIAGNOSIS — Z36.9 ANTENATAL SCREENING ENCOUNTER: ICD-10-CM

## 2024-02-27 DIAGNOSIS — U07.1 COVID-19 AFFECTING PREGNANCY IN THIRD TRIMESTER: ICD-10-CM

## 2024-02-27 DIAGNOSIS — O98.513 COVID-19 AFFECTING PREGNANCY IN THIRD TRIMESTER: ICD-10-CM

## 2024-02-27 DIAGNOSIS — G25.81 RESTLESS LEG SYNDROME IN PREGNANCY: ICD-10-CM

## 2024-02-27 DIAGNOSIS — Z34.83 ENCOUNTER FOR SUPERVISION OF OTHER NORMAL PREGNANCY IN THIRD TRIMESTER: Primary | ICD-10-CM

## 2024-02-27 LAB
GLUCOSE UR STRIP-MCNC: NEGATIVE MG/DL
PROT UR STRIP-MCNC: ABNORMAL MG/DL

## 2024-02-27 NOTE — PROGRESS NOTES
Ob follow up    Anaya Conrad is a 23 y.o.  36w3d patient being seen today for her obstetrical visit. Patient reports no complaints. Fetal movement: normal.    Her prenatal care is complicated by (and status) : Hx of HTN, Hx of chlamydia        ROS -   Fetal Movement good   Vaginal bleeding none   Cramping/Contractions intermittent      /74   Wt 60.8 kg (134 lb)   LMP 2023 (Approximate)   BMI 24.51 kg/m²     Prenatal Assessment  Fetal Heart Rate: 144  Fundal Height (cm): 33 cm  Movement: Present  Presentation: Vertex  Dilation/Effacement/Station  Dilation: 1  Effacement (%): 0  Station: -2  Edema  LLE Edema: Trace  RLE Edema: Trace  Facial Edema: None      Assessment    Diagnoses and all orders for this visit:    1. Encounter for supervision of other normal pregnancy in third trimester (Primary)  -     POC Urinalysis Dipstick    2. Pre-existing essential hypertension during pregnancy in third trimester    3. Chlamydia trachomatis infection in mother during third trimester of pregnancy  -     Chlamydia trachomatis, Neisseria gonorrhoeae, Trichomonas vaginalis, PCR - Swab, Cervix    4. Restless leg syndrome in pregnancy    5. COVID-19 affecting pregnancy in third trimester    6.  screening encounter  -     Strep B Screen - , Vaginal/Rectum        1) Pregnancy at 36w3d  2) Fetal status reassuring   3) GBS status - done today   2) Hx of chronic HTN- no elevation this pregnancy, currently no medication   On ASA  BP remains good.   Normal baseline testing   Continue to follow trend  3) Hx of chlamydia   Treated with negative test of cure.  STD screen today    4) COVID   Recently - resolved    Doing well, [facility] encouraged   5) Restless leg   Using vistaril as needed and is improved     Plan    Labor warnings   [facility]        Gary Rosales MD   2024  16:05 EST

## 2024-02-29 LAB
C TRACH RRNA SPEC QL NAA+PROBE: NEGATIVE
GP B STREP DNA SPEC QL NAA+PROBE: NEGATIVE
N GONORRHOEA RRNA SPEC QL NAA+PROBE: NEGATIVE
T VAGINALIS RRNA SPEC QL NAA+PROBE: NEGATIVE

## 2024-03-01 ENCOUNTER — TELEPHONE (OUTPATIENT)
Dept: OBSTETRICS AND GYNECOLOGY | Facility: CLINIC | Age: 24
End: 2024-03-01
Payer: COMMERCIAL

## 2024-03-01 NOTE — TELEPHONE ENCOUNTER
----- Message from Gary Rosales MD sent at 3/1/2024  9:42 AM EST -----  Oly, please let her know the vaginal culture STD screen for Chlamydia, Gonorrhea and trichomoniasis is negative. GBS also negative.  Thanks, Dr. Rosales

## 2024-03-05 ENCOUNTER — ROUTINE PRENATAL (OUTPATIENT)
Dept: OBSTETRICS AND GYNECOLOGY | Facility: CLINIC | Age: 24
End: 2024-03-05
Payer: COMMERCIAL

## 2024-03-05 VITALS — SYSTOLIC BLOOD PRESSURE: 138 MMHG | DIASTOLIC BLOOD PRESSURE: 82 MMHG | BODY MASS INDEX: 25.24 KG/M2 | WEIGHT: 138 LBS

## 2024-03-05 DIAGNOSIS — A56.8 CHLAMYDIA TRACHOMATIS INFECTION IN MOTHER DURING THIRD TRIMESTER OF PREGNANCY: ICD-10-CM

## 2024-03-05 DIAGNOSIS — O10.013 PRE-EXISTING ESSENTIAL HYPERTENSION DURING PREGNANCY IN THIRD TRIMESTER: ICD-10-CM

## 2024-03-05 DIAGNOSIS — O99.350 RESTLESS LEG SYNDROME IN PREGNANCY: ICD-10-CM

## 2024-03-05 DIAGNOSIS — Z34.83 ENCOUNTER FOR SUPERVISION OF OTHER NORMAL PREGNANCY IN THIRD TRIMESTER: Primary | ICD-10-CM

## 2024-03-05 DIAGNOSIS — O98.313 CHLAMYDIA TRACHOMATIS INFECTION IN MOTHER DURING THIRD TRIMESTER OF PREGNANCY: ICD-10-CM

## 2024-03-05 DIAGNOSIS — G25.81 RESTLESS LEG SYNDROME IN PREGNANCY: ICD-10-CM

## 2024-03-05 LAB
GLUCOSE UR STRIP-MCNC: NEGATIVE MG/DL
PROT UR STRIP-MCNC: NEGATIVE MG/DL

## 2024-03-05 NOTE — PROGRESS NOTES
Ob follow up    Anaya Conrad is a 23 y.o.  37w3d patient being seen today for her obstetrical visit. Patient reports backache and pain at the top pf abdomen . Fetal movement: normal.    Her prenatal care is complicated by (and status) : Hx of HTN, Hx of chlamydia        ROS -   Fetal Movement good   Vaginal bleeding none   Cramping/Contractions none      /82   Wt 62.6 kg (138 lb)   LMP 2023 (Approximate)   BMI 25.24 kg/m²     Prenatal Assessment  Fetal Heart Rate: 154  Fundal Height (cm): 33 cm  Movement: Present  Presentation: Vertex  Dilation/Effacement/Station  Dilation: 2  Effacement (%): 30  Station: -2  Edema  LLE Edema: Trace  RLE Edema: Trace  Facial Edema: None      Assessment    Diagnoses and all orders for this visit:    1. Encounter for supervision of other normal pregnancy in third trimester (Primary)  -     POC Urinalysis Dipstick    2. Pre-existing essential hypertension during pregnancy in third trimester    3. Chlamydia trachomatis infection in mother during third trimester of pregnancy    4. Restless leg syndrome in pregnancy        1) Pregnancy at 37w3d  2) Fetal status reassuring   3) GBS status - negative   4) Hx of chronic HTN- no elevation this pregnancy, currently no medication   On ASA  BP remains good.   Normal baseline testing   Continue to follow trend  3) Hx of chlamydia   Treated with negative test of cure.  Negative last week as well   4) COVID   Recently - resolved    Doing well, [facility] BID encouraged   5) Restless leg   Using vistaril as needed and is improved     Plan    Labor warnings   [facility] BID        Gary Rosales MD   3/5/2024  15:59 EST

## 2024-03-12 ENCOUNTER — ROUTINE PRENATAL (OUTPATIENT)
Dept: OBSTETRICS AND GYNECOLOGY | Facility: CLINIC | Age: 24
End: 2024-03-12
Payer: COMMERCIAL

## 2024-03-12 VITALS — BODY MASS INDEX: 25.42 KG/M2 | SYSTOLIC BLOOD PRESSURE: 128 MMHG | DIASTOLIC BLOOD PRESSURE: 81 MMHG | WEIGHT: 139 LBS

## 2024-03-12 DIAGNOSIS — Z34.83 ENCOUNTER FOR SUPERVISION OF OTHER NORMAL PREGNANCY IN THIRD TRIMESTER: Primary | ICD-10-CM

## 2024-03-12 DIAGNOSIS — G25.81 RESTLESS LEG SYNDROME IN PREGNANCY: ICD-10-CM

## 2024-03-12 DIAGNOSIS — O98.313 CHLAMYDIA TRACHOMATIS INFECTION IN MOTHER DURING THIRD TRIMESTER OF PREGNANCY: ICD-10-CM

## 2024-03-12 DIAGNOSIS — O99.350 RESTLESS LEG SYNDROME IN PREGNANCY: ICD-10-CM

## 2024-03-12 DIAGNOSIS — O98.513 COVID-19 AFFECTING PREGNANCY IN THIRD TRIMESTER: ICD-10-CM

## 2024-03-12 DIAGNOSIS — U07.1 COVID-19 AFFECTING PREGNANCY IN THIRD TRIMESTER: ICD-10-CM

## 2024-03-12 DIAGNOSIS — A56.8 CHLAMYDIA TRACHOMATIS INFECTION IN MOTHER DURING THIRD TRIMESTER OF PREGNANCY: ICD-10-CM

## 2024-03-12 DIAGNOSIS — O10.013 PRE-EXISTING ESSENTIAL HYPERTENSION DURING PREGNANCY IN THIRD TRIMESTER: ICD-10-CM

## 2024-03-12 LAB
GLUCOSE UR STRIP-MCNC: NEGATIVE MG/DL
PROT UR STRIP-MCNC: ABNORMAL MG/DL

## 2024-03-12 PROCEDURE — 99213 OFFICE O/P EST LOW 20 MIN: CPT | Performed by: OBSTETRICS & GYNECOLOGY

## 2024-03-12 NOTE — PROGRESS NOTES
Ob follow up    Anaya Conrad is a 23 y.o.  38w3d patient being seen today for her obstetrical visit. Patient reports  swelling . Fetal movement: normal.    Her prenatal care is complicated by (and status) : Hx of HTN, Hx of chlamydia       ROS -   Fetal Movement good   Vaginal bleeding none   Cramping/Contractions none      /81   Wt 63 kg (139 lb)   LMP 2023 (Approximate)   BMI 25.42 kg/m²     Prenatal Assessment  Fetal Heart Rate: 134  Fundal Height (cm): 34 cm  Movement: Present  Presentation: Vertex  Dilation/Effacement/Station  Dilation: 2  Effacement (%): 30  Station: -2  Edema  LLE Edema: Trace  RLE Edema: Trace  Facial Edema: None      Assessment    Diagnoses and all orders for this visit:    1. Encounter for supervision of other normal pregnancy in third trimester (Primary)  -     POC Urinalysis Dipstick    2. Pre-existing essential hypertension during pregnancy in third trimester    3. Chlamydia trachomatis infection in mother during third trimester of pregnancy    4. COVID-19 affecting pregnancy in third trimester    5. Restless leg syndrome in pregnancy        1) Pregnancy at 38w3d  2) Fetal status reassuring   3) GBS status - negative   4) Hx of chronic HTN- no elevation this pregnancy, currently no medication   On ASA  BP remains good.   Normal baseline testing   Continue to follow trend  3) Hx of chlamydia   Treated with negative test of cure.  Negative most recently as well   4) COVID   Recently - resolved    Doing well, [facility] BID encouraged   5) Restless leg   Using vistaril as needed and is improved     Scheduled induction next week depending on situation     Plan    Labor warnings   [facility] BID        Gary Rosales MD   3/12/2024  15:45 EDT

## 2024-03-13 PROBLEM — G43.109 MIGRAINE WITH AURA AND WITHOUT STATUS MIGRAINOSUS, NOT INTRACTABLE: Status: ACTIVE | Noted: 2019-12-10

## 2024-03-19 ENCOUNTER — ROUTINE PRENATAL (OUTPATIENT)
Dept: OBSTETRICS AND GYNECOLOGY | Facility: CLINIC | Age: 24
End: 2024-03-19
Payer: COMMERCIAL

## 2024-03-19 VITALS — DIASTOLIC BLOOD PRESSURE: 75 MMHG | BODY MASS INDEX: 25.79 KG/M2 | SYSTOLIC BLOOD PRESSURE: 124 MMHG | WEIGHT: 141 LBS

## 2024-03-19 DIAGNOSIS — O99.350 RESTLESS LEG SYNDROME IN PREGNANCY: ICD-10-CM

## 2024-03-19 DIAGNOSIS — G25.81 RESTLESS LEG SYNDROME IN PREGNANCY: ICD-10-CM

## 2024-03-19 DIAGNOSIS — Z34.83 ENCOUNTER FOR SUPERVISION OF OTHER NORMAL PREGNANCY IN THIRD TRIMESTER: Primary | ICD-10-CM

## 2024-03-19 LAB
GLUCOSE UR STRIP-MCNC: NEGATIVE MG/DL
PROT UR STRIP-MCNC: NEGATIVE MG/DL

## 2024-03-19 RX ORDER — SODIUM CHLORIDE 0.9 % (FLUSH) 0.9 %
10 SYRINGE (ML) INJECTION EVERY 12 HOURS SCHEDULED
Status: CANCELLED | OUTPATIENT
Start: 2024-03-19

## 2024-03-19 RX ORDER — SODIUM CHLORIDE 9 MG/ML
40 INJECTION, SOLUTION INTRAVENOUS AS NEEDED
Status: CANCELLED | OUTPATIENT
Start: 2024-03-19

## 2024-03-19 RX ORDER — OXYTOCIN/0.9 % SODIUM CHLORIDE 30/500 ML
999 PLASTIC BAG, INJECTION (ML) INTRAVENOUS ONCE
Status: CANCELLED | OUTPATIENT
Start: 2024-03-19

## 2024-03-19 RX ORDER — SODIUM CHLORIDE, SODIUM LACTATE, POTASSIUM CHLORIDE, CALCIUM CHLORIDE 600; 310; 30; 20 MG/100ML; MG/100ML; MG/100ML; MG/100ML
125 INJECTION, SOLUTION INTRAVENOUS CONTINUOUS
Status: CANCELLED | OUTPATIENT
Start: 2024-03-19

## 2024-03-19 RX ORDER — METHYLERGONOVINE MALEATE 0.2 MG/ML
200 INJECTION INTRAVENOUS ONCE AS NEEDED
Status: CANCELLED | OUTPATIENT
Start: 2024-03-19

## 2024-03-19 RX ORDER — CARBOPROST TROMETHAMINE 250 UG/ML
250 INJECTION, SOLUTION INTRAMUSCULAR
Status: CANCELLED | OUTPATIENT
Start: 2024-03-19

## 2024-03-19 RX ORDER — OXYTOCIN/0.9 % SODIUM CHLORIDE 30/500 ML
250 PLASTIC BAG, INJECTION (ML) INTRAVENOUS CONTINUOUS
Status: CANCELLED | OUTPATIENT
Start: 2024-03-19 | End: 2024-03-19

## 2024-03-19 RX ORDER — FAMOTIDINE 10 MG
20 TABLET ORAL 2 TIMES DAILY PRN
Status: CANCELLED | OUTPATIENT
Start: 2024-03-19

## 2024-03-19 RX ORDER — SODIUM CHLORIDE 0.9 % (FLUSH) 0.9 %
10 SYRINGE (ML) INJECTION AS NEEDED
Status: CANCELLED | OUTPATIENT
Start: 2024-03-19

## 2024-03-19 RX ORDER — TERBUTALINE SULFATE 1 MG/ML
0.25 INJECTION, SOLUTION SUBCUTANEOUS AS NEEDED
Status: CANCELLED | OUTPATIENT
Start: 2024-03-19

## 2024-03-19 RX ORDER — OXYTOCIN/0.9 % SODIUM CHLORIDE 30/500 ML
2-20 PLASTIC BAG, INJECTION (ML) INTRAVENOUS
Status: CANCELLED | OUTPATIENT
Start: 2024-03-19

## 2024-03-19 RX ORDER — ACETAMINOPHEN 325 MG/1
650 TABLET ORAL EVERY 4 HOURS PRN
Status: CANCELLED | OUTPATIENT
Start: 2024-03-19

## 2024-03-19 RX ORDER — MISOPROSTOL 100 UG/1
800 TABLET ORAL ONCE AS NEEDED
Status: CANCELLED | OUTPATIENT
Start: 2024-03-19

## 2024-03-19 RX ORDER — ONDANSETRON 2 MG/ML
4 INJECTION INTRAMUSCULAR; INTRAVENOUS EVERY 6 HOURS PRN
Status: CANCELLED | OUTPATIENT
Start: 2024-03-19

## 2024-03-19 RX ORDER — MAGNESIUM CARB/ALUMINUM HYDROX 105-160MG
30 TABLET,CHEWABLE ORAL ONCE AS NEEDED
Status: CANCELLED | OUTPATIENT
Start: 2024-03-19

## 2024-03-19 RX ORDER — LIDOCAINE HYDROCHLORIDE 10 MG/ML
0.5 INJECTION, SOLUTION INFILTRATION; PERINEURAL ONCE AS NEEDED
Status: CANCELLED | OUTPATIENT
Start: 2024-03-19

## 2024-03-19 RX ORDER — ONDANSETRON 4 MG/1
4 TABLET, ORALLY DISINTEGRATING ORAL EVERY 6 HOURS PRN
Status: CANCELLED | OUTPATIENT
Start: 2024-03-19

## 2024-03-19 RX ORDER — FAMOTIDINE 10 MG/ML
20 INJECTION, SOLUTION INTRAVENOUS 2 TIMES DAILY PRN
Status: CANCELLED | OUTPATIENT
Start: 2024-03-19

## 2024-03-19 NOTE — PATIENT INSTRUCTIONS
You have scheduled the birth of your baby and are ready to welcome a new member of your family. To prepare you for that day, review these helpful instructions.     INDUCTION   In preparation for this we have you call L&D approximately 2 hours prior to scheduled time to arrive @ [phone]  Please be aware that when you are calling it is to see if they are able to have you arrive at the expected time.   Sometimes they may have to delay the start time due to ongoing issues on L&D (room availability, staffing).   If that happens, rest assured we are working to get you in as soon as safe and reasonable.   During any wait times, it is okay to eat and drink, but we would prefer you avoid heavy or large meals if possible.     BEFORE YOUR  SECTION:  You should have nothing to eat or drink 8 hours prior to the scheduled time of your surgery, typically midnight the evening before surgery  You should arrive 2 hours prior to the start time of your procedure with anyone planning to go back with you for the procedure.   We suggest taking two 500 mg tylenol the evening before surgery to help with pain management.   We also suggest drinking a 20 oz sports drink 2-2.5 hours before surgery (typically on the way to the hospital)  Please do not apply any lotions within 48 hours prior to surgery/ delivery.  Please do not shave within 48 hours prior to surgery/ delivery.   Please bathe/shower with an antibacterial soap the night before your surgery.

## 2024-03-19 NOTE — PROGRESS NOTES
[facility] Neurology Note    Mj Block Patient Status:  Inpatient    1943 MRN [id number]   [facility] 8NE-A Attending Antonieta Madera MD   Hosp Day # 1 PCP Aj Khan MD     REASON FOR CONSULTATION:    Recurrent sync Ob follow up    Anaya Conrad is a 23 y.o.  39w3d patient being seen today for her obstetrical visit. Patient reports  pelvic pain . Fetal movement: normal.    Her prenatal care is complicated by (and status) : Hx of HTN, Hx of chlamydia        ROS -   Fetal Movement good   Vaginal bleeding none   Cramping/Contractions intermittent      /75   Wt 64 kg (141 lb)   LMP 2023 (Approximate)   BMI 25.79 kg/m²     Prenatal Assessment  Fetal Heart Rate: 146  Fundal Height (cm): 34 cm  Movement: Present  Presentation: Vertex  Dilation/Effacement/Station  Dilation: 2  Effacement (%): 30  Station: -2  Edema  LLE Edema: Trace  RLE Edema: Trace  Facial Edema: None      Assessment    Diagnoses and all orders for this visit:    1. Encounter for supervision of other normal pregnancy in third trimester (Primary)  -     POC Urinalysis Dipstick  -     Labor Induction  -     sodium chloride 0.9 % flush 10 mL  -     sodium chloride 0.9 % flush 10 mL  -     sodium chloride 0.9 % infusion 40 mL  -     lidocaine (XYLOCAINE) 1 % injection 0.5 mL  -     lactated ringers bolus 1,000 mL  -     lactated ringers infusion  -     acetaminophen (TYLENOL) tablet 650 mg  -     ondansetron ODT (ZOFRAN-ODT) disintegrating tablet 4 mg  -     ondansetron (ZOFRAN) injection 4 mg  -     oxytocin (PITOCIN) 30 units in 0.9% sodium chloride 500 mL (premix)  -     terbutaline (BRETHINE) injection 0.25 mg  -     mineral oil liquid 30 mL  -     famotidine (PEPCID) injection 20 mg  -     famotidine (PEPCID) tablet 20 mg  -     oxytocin (PITOCIN) 30 units in 0.9% sodium chloride 500 mL (premix)  -     oxytocin (PITOCIN) 30 units in 0.9% sodium chloride 500 mL (premix)  -     methylergonovine (METHERGINE) injection 200 mcg  -     carboprost (HEMABATE) injection 250 mcg  -     miSOPROStol (CYTOTEC) tablet 800 mcg  -     Tranexamic Acid 1,000 mg in sodium chloride 0.9 % 100 mL    2. Restless leg syndrome in pregnancy    Other orders  -      Admit To Obstetrics Inpatient; Standing  -     Code Status and Medical Interventions:; Standing  -     Obtain Informed Consent; Standing  -     Place Sequential Compression Device; Standing  -     Maintain Sequential Compression Device; Standing  -     Vital Signs q 4 while awake; Standing  -     Vital Signs Per Hospital Policy; Standing  -     Mini-Prep Prior to Delivery; Standing  -     Continuous Fetal Monitoring With NST on Admission and Prior to Initiation of Oxytocin.; Standing  -     External Uterine Contraction Monitoring; Standing  -     Notify Provider (Specified); Standing  -     Notify Provider of Tachysystole (Per Hospital Algorithm); Standing  -     Notify Provider if Membranes Ruptured, Bleeding Greater Than 1 Pad Per Hour, Fetal Heart Tone Abnormality or Severe Pain; Standing  -     Initiate Group Beta Strep (GBS) Prophylaxis Protocol, If Criteria Met; Standing  -     Position Change - For Intra-Uterine Resusitation for Hypertonus, HyperStimulation or Non-Reassuring Fetal Status; Standing  -     NPO Diet NPO Type: Ice Chips; Standing  -     Inpatient Anesthesiology Consult; Standing  -     T Pallidum Antibody w/ reflex RPR (Syphilis); Standing  -     CBC & Differential; Standing  -     Comprehensive Metabolic Panel; Standing  -     Type & Screen; Standing  -     Insert Peripheral IV; Standing  -     Saline Lock & Maintain IV Access; Standing  -     Notify Provider (Specified); Standing  -     Vital Signs Per Hospital Policy; Standing  -     Fundal & Lochia Check; Standing  -     Fundal & Lochia Check; Standing  -     Diet: Regular/House; Fluid Consistency: Thin (IDDSI 0); Standing  -     Advance Diet As Tolerated -; Standing        1) Pregnancy at 39w3d  2) Fetal status reassuring   3) GBS status - negative   4) Hx of chronic HTN- no elevation this pregnancy, currently no medication   On ASA  BP remains good.   Normal baseline testing   Continue to follow trend  3) Hx of chlamydia   Treated with  Julia Bailey MD;  Location:  ENDOSCOPY  No date: LAMINECTOMY,LUMBAR  HAND BONE GROWTH REMOVED: OTHER Right  ROTATOR CUFF REPAIR: OTHER Right  AAA STENT: OTHER  PROSTATE LASER SURGERY: OTHER  No date: SPINE SURGERY PROCEDURE UNLISTED      Comment: Cholecalciferol (VITAMIN D3) 1000 UNITS Oral Cap, Sig Take 2 tablets by mouth daily. , Start Date , End Date , Taking? , Authorizing Provider External/Patient, Reported    Medication insulin detemir 100 UNIT/ML Subcutaneous Solution Pen-injector, Sig Inject negative test of cure.  Negative most recently as well   4) COVID   Recently - resolved    Doing well, [facility] [place] [facility] encouraged   5) Restless leg   Using vistaril as needed and is improved     Plan elective induction for Friday if still pregnant  Instructions reviewed.     Plan    Labor warnings   [facility] [place] [facility]        Gary Rosales MD   3/19/2024  11:55 EDT    External/Patient, Reported    Medication Atenolol 25 MG Oral Tab, Sig Take 12.5 mg by mouth daily.   , Start Date , End Date , Taking? , Authorizing Provider External/Patient, Reported        Current Facility-Administered Medications:  atenolol (TENORMIN) t normal    CN VII: Symmetric facial movement   CN VIII: Normal hearing   CN IX, XI: uvula and palate elevate symmetrically    CN XI: shoulder shrug equal    CN XII: tongue midline  Motor: No drift, no focal arm or leg weakness, rapid finger movement symmetr increased. Carotid US CONCLUSION: Less than 50% carotid stenosis bilaterally based on visual and spectral analysis.     Impression    Peripheral neuropathy on gabapentin   Memory loss  DM     This is a 74year old male with recurrent syncopal episodes th

## 2024-03-19 NOTE — LETTER
24    SCHEDULING FORM  C-SECTIONS/INDUCTIONS    Patient:  Anaya Conrad  :  2000    Phone: [phone] (home)     OB provider:  Gary Rosales MD    Summary:  23 y.o. female     Type of Delivery:  Induction   Priority:  Elective    Desired Date: 24      Time: 0700    Dating   Estimated Date of Delivery: 3/23/24    Gestational age at Desired date of Induction or CS: 39 weeks 6 days  ----------------------------------------------------------------------------  By ACOG Guidelines, women should be 39 weeks or greater before initiating an elective induction (non-medically indicated) delivery     Maternal Indications:        Fetal/Placental Conditions:      ----------------------------------------------------------------------------    For patients less than 39 weeks with indications not included in the above list, [facility] consult is required prior to scheduling.    [facility] Approved indication:       Date of consult:      I attest that the above entries are accurate to the best of my knowledge:    Gary Rosales MD  3/19/2024  11:52 EDT

## 2024-03-20 ENCOUNTER — TELEPHONE (OUTPATIENT)
Dept: OBSTETRICS AND GYNECOLOGY | Facility: CLINIC | Age: 24
End: 2024-03-20
Payer: COMMERCIAL

## 2024-03-20 ENCOUNTER — HOSPITAL ENCOUNTER (INPATIENT)
Facility: HOSPITAL | Age: 24
LOS: 2 days | Discharge: HOME | End: 2024-03-22
Attending: OBSTETRICS & GYNECOLOGY | Admitting: OBSTETRICS & GYNECOLOGY
Payer: COMMERCIAL

## 2024-03-20 DIAGNOSIS — Z34.83 ENCOUNTER FOR SUPERVISION OF OTHER NORMAL PREGNANCY IN THIRD TRIMESTER: ICD-10-CM

## 2024-03-20 PROCEDURE — 80053 COMPREHEN METABOLIC PANEL: CPT | Performed by: OBSTETRICS & GYNECOLOGY

## 2024-03-20 PROCEDURE — 85025 COMPLETE CBC W/AUTO DIFF WBC: CPT | Performed by: OBSTETRICS & GYNECOLOGY

## 2024-03-20 PROCEDURE — 86780 TREPONEMA PALLIDUM: CPT | Performed by: OBSTETRICS & GYNECOLOGY

## 2024-03-20 PROCEDURE — 86850 RBC ANTIBODY SCREEN: CPT | Performed by: OBSTETRICS & GYNECOLOGY

## 2024-03-20 PROCEDURE — 25810000003 LACTATED RINGERS SOLUTION: Performed by: OBSTETRICS & GYNECOLOGY

## 2024-03-20 PROCEDURE — 99202 OFFICE O/P NEW SF 15 MIN: CPT | Performed by: OBSTETRICS & GYNECOLOGY

## 2024-03-20 PROCEDURE — 86900 BLOOD TYPING SEROLOGIC ABO: CPT | Performed by: OBSTETRICS & GYNECOLOGY

## 2024-03-20 PROCEDURE — 86901 BLOOD TYPING SEROLOGIC RH(D): CPT | Performed by: OBSTETRICS & GYNECOLOGY

## 2024-03-20 RX ORDER — SODIUM CHLORIDE 9 MG/ML
40 INJECTION, SOLUTION INTRAVENOUS AS NEEDED
Status: DISCONTINUED | OUTPATIENT
Start: 2024-03-20 | End: 2024-03-21 | Stop reason: HOSPADM

## 2024-03-20 RX ORDER — FAMOTIDINE 20 MG/1
20 TABLET, FILM COATED ORAL 2 TIMES DAILY PRN
Status: DISCONTINUED | OUTPATIENT
Start: 2024-03-20 | End: 2024-03-21 | Stop reason: HOSPADM

## 2024-03-20 RX ORDER — LIDOCAINE HYDROCHLORIDE 10 MG/ML
0.5 INJECTION, SOLUTION INFILTRATION; PERINEURAL ONCE AS NEEDED
Status: DISCONTINUED | OUTPATIENT
Start: 2024-03-20 | End: 2024-03-21 | Stop reason: HOSPADM

## 2024-03-20 RX ORDER — SODIUM CHLORIDE, SODIUM LACTATE, POTASSIUM CHLORIDE, CALCIUM CHLORIDE 600; 310; 30; 20 MG/100ML; MG/100ML; MG/100ML; MG/100ML
125 INJECTION, SOLUTION INTRAVENOUS CONTINUOUS
Status: DISCONTINUED | OUTPATIENT
Start: 2024-03-21 | End: 2024-03-21

## 2024-03-20 RX ORDER — FAMOTIDINE 10 MG/ML
20 INJECTION, SOLUTION INTRAVENOUS 2 TIMES DAILY PRN
Status: DISCONTINUED | OUTPATIENT
Start: 2024-03-20 | End: 2024-03-21 | Stop reason: HOSPADM

## 2024-03-20 RX ORDER — SODIUM CHLORIDE 0.9 % (FLUSH) 0.9 %
10 SYRINGE (ML) INJECTION AS NEEDED
Status: DISCONTINUED | OUTPATIENT
Start: 2024-03-20 | End: 2024-03-21 | Stop reason: HOSPADM

## 2024-03-20 RX ORDER — MAGNESIUM CARB/ALUMINUM HYDROX 105-160MG
30 TABLET,CHEWABLE ORAL ONCE AS NEEDED
Status: DISCONTINUED | OUTPATIENT
Start: 2024-03-20 | End: 2024-03-21 | Stop reason: HOSPADM

## 2024-03-20 RX ORDER — ONDANSETRON 4 MG/1
4 TABLET, ORALLY DISINTEGRATING ORAL EVERY 6 HOURS PRN
Status: DISCONTINUED | OUTPATIENT
Start: 2024-03-20 | End: 2024-03-21 | Stop reason: HOSPADM

## 2024-03-20 RX ORDER — ACETAMINOPHEN 325 MG/1
650 TABLET ORAL EVERY 4 HOURS PRN
Status: DISCONTINUED | OUTPATIENT
Start: 2024-03-20 | End: 2024-03-21 | Stop reason: HOSPADM

## 2024-03-20 RX ORDER — TERBUTALINE SULFATE 1 MG/ML
0.25 INJECTION, SOLUTION SUBCUTANEOUS AS NEEDED
Status: DISCONTINUED | OUTPATIENT
Start: 2024-03-20 | End: 2024-03-21 | Stop reason: HOSPADM

## 2024-03-20 RX ORDER — SODIUM CHLORIDE 0.9 % (FLUSH) 0.9 %
10 SYRINGE (ML) INJECTION EVERY 12 HOURS SCHEDULED
Status: DISCONTINUED | OUTPATIENT
Start: 2024-03-21 | End: 2024-03-21 | Stop reason: HOSPADM

## 2024-03-20 RX ORDER — ONDANSETRON 2 MG/ML
4 INJECTION INTRAMUSCULAR; INTRAVENOUS EVERY 6 HOURS PRN
Status: DISCONTINUED | OUTPATIENT
Start: 2024-03-20 | End: 2024-03-21 | Stop reason: HOSPADM

## 2024-03-20 RX ADMIN — SODIUM CHLORIDE, POTASSIUM CHLORIDE, SODIUM LACTATE AND CALCIUM CHLORIDE 1000 ML: 600; 310; 30; 20 INJECTION, SOLUTION INTRAVENOUS at 23:46

## 2024-03-21 ENCOUNTER — ANESTHESIA EVENT (OUTPATIENT)
Dept: LABOR AND DELIVERY | Facility: HOSPITAL | Age: 24
End: 2024-03-21
Payer: COMMERCIAL

## 2024-03-21 ENCOUNTER — ANESTHESIA (OUTPATIENT)
Dept: LABOR AND DELIVERY | Facility: HOSPITAL | Age: 24
End: 2024-03-21
Payer: COMMERCIAL

## 2024-03-21 LAB
ABO GROUP BLD: NORMAL
ALBUMIN SERPL-MCNC: 3.3 G/DL (ref 3.5–5.2)
ALBUMIN/GLOB SERPL: 1.2 G/DL
ALP SERPL-CCNC: 222 U/L (ref 39–117)
ALT SERPL W P-5'-P-CCNC: 10 U/L (ref 1–33)
ANION GAP SERPL CALCULATED.3IONS-SCNC: 12.3 MMOL/L (ref 5–15)
AST SERPL-CCNC: 15 U/L (ref 1–32)
BASOPHILS # BLD AUTO: 0.02 10*3/MM3 (ref 0–0.2)
BASOPHILS NFR BLD AUTO: 0.3 % (ref 0–1.5)
BILIRUB SERPL-MCNC: <0.2 MG/DL (ref 0–1.2)
BLD GP AB SCN SERPL QL: NEGATIVE
BUN SERPL-MCNC: 9 MG/DL (ref 6–20)
BUN/CREAT SERPL: 16.4 (ref 7–25)
CALCIUM SPEC-SCNC: 8.7 MG/DL (ref 8.6–10.5)
CHLORIDE SERPL-SCNC: 106 MMOL/L (ref 98–107)
CO2 SERPL-SCNC: 19.7 MMOL/L (ref 22–29)
CREAT SERPL-MCNC: 0.55 MG/DL (ref 0.57–1)
DEPRECATED RDW RBC AUTO: 41.3 FL (ref 37–54)
EGFRCR SERPLBLD CKD-EPI 2021: 130.6 ML/MIN/1.73
EOSINOPHIL # BLD AUTO: 0.07 10*3/MM3 (ref 0–0.4)
EOSINOPHIL NFR BLD AUTO: 1.2 % (ref 0.3–6.2)
ERYTHROCYTE [DISTWIDTH] IN BLOOD BY AUTOMATED COUNT: 14.4 % (ref 12.3–15.4)
GLOBULIN UR ELPH-MCNC: 2.7 GM/DL
GLUCOSE SERPL-MCNC: 78 MG/DL (ref 65–99)
HCT VFR BLD AUTO: 31.3 % (ref 34–46.6)
HGB BLD-MCNC: 10.4 G/DL (ref 12–15.9)
LYMPHOCYTES # BLD AUTO: 1.42 10*3/MM3 (ref 0.7–3.1)
LYMPHOCYTES NFR BLD AUTO: 24.1 % (ref 19.6–45.3)
MCH RBC QN AUTO: 26.2 PG (ref 26.6–33)
MCHC RBC AUTO-ENTMCNC: 33.2 G/DL (ref 31.5–35.7)
MCV RBC AUTO: 78.8 FL (ref 79–97)
MONOCYTES # BLD AUTO: 0.43 10*3/MM3 (ref 0.1–0.9)
MONOCYTES NFR BLD AUTO: 7.3 % (ref 5–12)
NEUTROPHILS NFR BLD AUTO: 3.91 10*3/MM3 (ref 1.7–7)
NEUTROPHILS NFR BLD AUTO: 66.6 % (ref 42.7–76)
PLATELET # BLD AUTO: 150 10*3/MM3 (ref 140–450)
PMV BLD AUTO: 13.8 FL (ref 6–12)
POTASSIUM SERPL-SCNC: 3.8 MMOL/L (ref 3.5–5.2)
PROT SERPL-MCNC: 6 G/DL (ref 6–8.5)
RBC # BLD AUTO: 3.97 10*6/MM3 (ref 3.77–5.28)
RH BLD: POSITIVE
SODIUM SERPL-SCNC: 138 MMOL/L (ref 136–145)
T PALLIDUM IGG SER QL: NORMAL
T&S EXPIRATION DATE: NORMAL
WBC NRBC COR # BLD AUTO: 5.88 10*3/MM3 (ref 3.4–10.8)

## 2024-03-21 PROCEDURE — C1755 CATHETER, INTRASPINAL: HCPCS | Performed by: STUDENT IN AN ORGANIZED HEALTH CARE EDUCATION/TRAINING PROGRAM

## 2024-03-21 PROCEDURE — 25810000003 LACTATED RINGERS PER 1000 ML: Performed by: OBSTETRICS & GYNECOLOGY

## 2024-03-21 PROCEDURE — 59410 OBSTETRICAL CARE: CPT | Performed by: OBSTETRICS & GYNECOLOGY

## 2024-03-21 PROCEDURE — 25010000002 ROPIVACAINE PER 1 MG: Performed by: STUDENT IN AN ORGANIZED HEALTH CARE EDUCATION/TRAINING PROGRAM

## 2024-03-21 PROCEDURE — 25010000002 LIDOCAINE 1 % SOLUTION: Performed by: STUDENT IN AN ORGANIZED HEALTH CARE EDUCATION/TRAINING PROGRAM

## 2024-03-21 PROCEDURE — 25010000002 ONDANSETRON PER 1 MG: Performed by: OBSTETRICS & GYNECOLOGY

## 2024-03-21 PROCEDURE — 88307 TISSUE EXAM BY PATHOLOGIST: CPT

## 2024-03-21 PROCEDURE — 59025 FETAL NON-STRESS TEST: CPT

## 2024-03-21 RX ORDER — PROMETHAZINE HYDROCHLORIDE 12.5 MG/1
12.5 SUPPOSITORY RECTAL EVERY 6 HOURS PRN
Status: DISCONTINUED | OUTPATIENT
Start: 2024-03-21 | End: 2024-03-22 | Stop reason: HOSPADM

## 2024-03-21 RX ORDER — HYDROCORTISONE 25 MG/G
CREAM TOPICAL AS NEEDED
Status: DISCONTINUED | OUTPATIENT
Start: 2024-03-21 | End: 2024-03-22 | Stop reason: HOSPADM

## 2024-03-21 RX ORDER — BISACODYL 10 MG
10 SUPPOSITORY, RECTAL RECTAL DAILY PRN
Status: DISCONTINUED | OUTPATIENT
Start: 2024-03-22 | End: 2024-03-22 | Stop reason: HOSPADM

## 2024-03-21 RX ORDER — OXYTOCIN/0.9 % SODIUM CHLORIDE 30/500 ML
2-20 PLASTIC BAG, INJECTION (ML) INTRAVENOUS
Status: DISCONTINUED | OUTPATIENT
Start: 2024-03-21 | End: 2024-03-22 | Stop reason: HOSPADM

## 2024-03-21 RX ORDER — TRAMADOL HYDROCHLORIDE 50 MG/1
50 TABLET ORAL EVERY 6 HOURS PRN
Status: DISCONTINUED | OUTPATIENT
Start: 2024-03-21 | End: 2024-03-22 | Stop reason: HOSPADM

## 2024-03-21 RX ORDER — IBUPROFEN 600 MG/1
600 TABLET ORAL EVERY 6 HOURS PRN
Status: DISCONTINUED | OUTPATIENT
Start: 2024-03-21 | End: 2024-03-22 | Stop reason: HOSPADM

## 2024-03-21 RX ORDER — FENTANYL/ROPIVACAINE/NS/PF 2MCG/ML-.2
10 PLASTIC BAG, INJECTION (ML) INJECTION CONTINUOUS
Status: DISCONTINUED | OUTPATIENT
Start: 2024-03-21 | End: 2024-03-21

## 2024-03-21 RX ORDER — ONDANSETRON 2 MG/ML
4 INJECTION INTRAMUSCULAR; INTRAVENOUS EVERY 6 HOURS PRN
Status: DISCONTINUED | OUTPATIENT
Start: 2024-03-21 | End: 2024-03-22 | Stop reason: HOSPADM

## 2024-03-21 RX ORDER — METHYLERGONOVINE MALEATE 0.2 MG/ML
200 INJECTION INTRAVENOUS ONCE AS NEEDED
Status: DISCONTINUED | OUTPATIENT
Start: 2024-03-21 | End: 2024-03-21 | Stop reason: HOSPADM

## 2024-03-21 RX ORDER — LIDOCAINE HYDROCHLORIDE 10 MG/ML
INJECTION, SOLUTION INFILTRATION; PERINEURAL AS NEEDED
Status: DISCONTINUED | OUTPATIENT
Start: 2024-03-21 | End: 2024-03-21 | Stop reason: SURG

## 2024-03-21 RX ORDER — ROPIVACAINE HYDROCHLORIDE 2 MG/ML
INJECTION, SOLUTION EPIDURAL; INFILTRATION; PERINEURAL AS NEEDED
Status: DISCONTINUED | OUTPATIENT
Start: 2024-03-21 | End: 2024-03-21 | Stop reason: SURG

## 2024-03-21 RX ORDER — OXYTOCIN/0.9 % SODIUM CHLORIDE 30/500 ML
125 PLASTIC BAG, INJECTION (ML) INTRAVENOUS ONCE AS NEEDED
Status: COMPLETED | OUTPATIENT
Start: 2024-03-21 | End: 2024-03-21

## 2024-03-21 RX ORDER — OXYTOCIN/0.9 % SODIUM CHLORIDE 30/500 ML
999 PLASTIC BAG, INJECTION (ML) INTRAVENOUS ONCE
Status: COMPLETED | OUTPATIENT
Start: 2024-03-21 | End: 2024-03-21

## 2024-03-21 RX ORDER — ONDANSETRON 4 MG/1
4 TABLET, ORALLY DISINTEGRATING ORAL EVERY 6 HOURS PRN
Status: DISCONTINUED | OUTPATIENT
Start: 2024-03-21 | End: 2024-03-22 | Stop reason: HOSPADM

## 2024-03-21 RX ORDER — TRANEXAMIC ACID 10 MG/ML
1000 INJECTION, SOLUTION INTRAVENOUS ONCE AS NEEDED
Status: DISCONTINUED | OUTPATIENT
Start: 2024-03-21 | End: 2024-03-22 | Stop reason: HOSPADM

## 2024-03-21 RX ORDER — SODIUM CHLORIDE 0.9 % (FLUSH) 0.9 %
1-10 SYRINGE (ML) INJECTION AS NEEDED
Status: DISCONTINUED | OUTPATIENT
Start: 2024-03-21 | End: 2024-03-22 | Stop reason: HOSPADM

## 2024-03-21 RX ORDER — OXYTOCIN/0.9 % SODIUM CHLORIDE 30/500 ML
250 PLASTIC BAG, INJECTION (ML) INTRAVENOUS CONTINUOUS
Status: DISPENSED | OUTPATIENT
Start: 2024-03-21 | End: 2024-03-21

## 2024-03-21 RX ORDER — ACETAMINOPHEN 325 MG/1
650 TABLET ORAL EVERY 6 HOURS PRN
Status: DISCONTINUED | OUTPATIENT
Start: 2024-03-21 | End: 2024-03-22 | Stop reason: HOSPADM

## 2024-03-21 RX ORDER — CARBOPROST TROMETHAMINE 250 UG/ML
250 INJECTION, SOLUTION INTRAMUSCULAR
Status: DISCONTINUED | OUTPATIENT
Start: 2024-03-21 | End: 2024-03-21 | Stop reason: HOSPADM

## 2024-03-21 RX ORDER — DOCUSATE SODIUM 100 MG/1
100 CAPSULE, LIQUID FILLED ORAL 2 TIMES DAILY
Status: DISCONTINUED | OUTPATIENT
Start: 2024-03-21 | End: 2024-03-22 | Stop reason: HOSPADM

## 2024-03-21 RX ORDER — MISOPROSTOL 200 UG/1
800 TABLET ORAL ONCE AS NEEDED
Status: DISCONTINUED | OUTPATIENT
Start: 2024-03-21 | End: 2024-03-21 | Stop reason: HOSPADM

## 2024-03-21 RX ORDER — PROMETHAZINE HYDROCHLORIDE 25 MG/1
25 TABLET ORAL EVERY 6 HOURS PRN
Status: DISCONTINUED | OUTPATIENT
Start: 2024-03-21 | End: 2024-03-22 | Stop reason: HOSPADM

## 2024-03-21 RX ORDER — EPHEDRINE SULFATE 50 MG/ML
5 INJECTION, SOLUTION INTRAVENOUS
Status: DISCONTINUED | OUTPATIENT
Start: 2024-03-21 | End: 2024-03-21 | Stop reason: HOSPADM

## 2024-03-21 RX ORDER — ONDANSETRON 2 MG/ML
4 INJECTION INTRAMUSCULAR; INTRAVENOUS ONCE AS NEEDED
Status: DISCONTINUED | OUTPATIENT
Start: 2024-03-21 | End: 2024-03-21 | Stop reason: HOSPADM

## 2024-03-21 RX ORDER — DIPHENHYDRAMINE HYDROCHLORIDE 50 MG/ML
12.5 INJECTION INTRAMUSCULAR; INTRAVENOUS EVERY 8 HOURS PRN
Status: DISCONTINUED | OUTPATIENT
Start: 2024-03-21 | End: 2024-03-21 | Stop reason: HOSPADM

## 2024-03-21 RX ORDER — OXYTOCIN/0.9 % SODIUM CHLORIDE 30/500 ML
PLASTIC BAG, INJECTION (ML) INTRAVENOUS
Status: COMPLETED
Start: 2024-03-21 | End: 2024-03-21

## 2024-03-21 RX ORDER — PRENATAL VIT/IRON FUM/FOLIC AC 27MG-0.8MG
1 TABLET ORAL DAILY
Status: DISCONTINUED | OUTPATIENT
Start: 2024-03-21 | End: 2024-03-22 | Stop reason: HOSPADM

## 2024-03-21 RX ADMIN — TRAMADOL HYDROCHLORIDE 50 MG: 50 TABLET ORAL at 20:30

## 2024-03-21 RX ADMIN — DOCUSATE SODIUM 100 MG: 100 CAPSULE, LIQUID FILLED ORAL at 09:12

## 2024-03-21 RX ADMIN — Medication 999 ML/HR: at 05:49

## 2024-03-21 RX ADMIN — LIDOCAINE HYDROCHLORIDE 4 ML: 10; .005 INJECTION, SOLUTION EPIDURAL; INFILTRATION; INTRACAUDAL; PERINEURAL at 00:37

## 2024-03-21 RX ADMIN — Medication 125 ML/HR: at 07:05

## 2024-03-21 RX ADMIN — ACETAMINOPHEN 325MG 650 MG: 325 TABLET ORAL at 09:12

## 2024-03-21 RX ADMIN — Medication: at 09:13

## 2024-03-21 RX ADMIN — LIDOCAINE HYDROCHLORIDE 3 ML: 10 INJECTION, SOLUTION INFILTRATION; PERINEURAL at 00:34

## 2024-03-21 RX ADMIN — Medication 10 ML/HR: at 00:43

## 2024-03-21 RX ADMIN — ACETAMINOPHEN 325MG 650 MG: 325 TABLET ORAL at 15:30

## 2024-03-21 RX ADMIN — SODIUM CHLORIDE, POTASSIUM CHLORIDE, SODIUM LACTATE AND CALCIUM CHLORIDE 125 ML/HR: 600; 310; 30; 20 INJECTION, SOLUTION INTRAVENOUS at 01:00

## 2024-03-21 RX ADMIN — Medication 1 TABLET: at 09:12

## 2024-03-21 RX ADMIN — IBUPROFEN 600 MG: 600 TABLET, FILM COATED ORAL at 15:30

## 2024-03-21 RX ADMIN — FAMOTIDINE 20 MG: 10 INJECTION INTRAVENOUS at 01:38

## 2024-03-21 RX ADMIN — ONDANSETRON 4 MG: 2 INJECTION INTRAMUSCULAR; INTRAVENOUS at 01:37

## 2024-03-21 RX ADMIN — DOCUSATE SODIUM 100 MG: 100 CAPSULE, LIQUID FILLED ORAL at 20:30

## 2024-03-21 RX ADMIN — IBUPROFEN 600 MG: 600 TABLET, FILM COATED ORAL at 09:12

## 2024-03-21 RX ADMIN — ROPIVACAINE HYDROCHLORIDE 6 ML: 2 INJECTION, SOLUTION EPIDURAL; INFILTRATION at 00:41

## 2024-03-21 RX ADMIN — SODIUM CHLORIDE, POTASSIUM CHLORIDE, SODIUM LACTATE AND CALCIUM CHLORIDE 125 ML/HR: 600; 310; 30; 20 INJECTION, SOLUTION INTRAVENOUS at 01:53

## 2024-03-21 NOTE — PLAN OF CARE
Problem: Adult Inpatient Plan of Care  Goal: Plan of Care Review  3/21/2024 0637 by My Peraza RN  Outcome: Ongoing, Progressing  Flowsheets (Taken 3/21/2024 0637)  Outcome Evaluation:  @39.5 weeks gestation, spontaneous labor with  @0546 viable female infant with 8/9 apgars. Patient thus far stable in the recovery period and will be moved to  after recovery is up  3/21/2024 0015 by My Peraza RN  Outcome: Ongoing, Progressing  Flowsheets (Taken 3/21/2024 0015)  Outcome Evaluation:  @39.5 weeks spontaneous labor  Goal: Patient-Specific Goal (Individualized)  3/21/2024 0637 by My Peraza RN  Outcome: Ongoing, Progressing  Flowsheets (Taken 3/21/2024 0015)  Anxieties, Fears or Concerns: no anxieties, none, none  3/21/2024 0015 by My Peraza RN  Outcome: Ongoing, Progressing  Flowsheets (Taken 3/21/2024 0015)  Anxieties, Fears or Concerns: no anxieties, none, none  Goal: Absence of Hospital-Acquired Illness or Injury  3/21/2024 0637 by My Peraza RN  Outcome: Ongoing, Progressing  3/21/2024 0015 by My Peraza RN  Outcome: Ongoing, Progressing  Goal: Optimal Comfort and Wellbeing  3/21/2024 0637 by My Peraza RN  Outcome: Ongoing, Progressing  3/21/2024 0015 by My Peraza RN  Outcome: Ongoing, Progressing  Goal: Readiness for Transition of Care  3/21/2024 0637 by My Peraza RN  Outcome: Ongoing, Progressing  3/21/2024 0015 by My Peraza RN  Outcome: Ongoing, Progressing  Intervention: Mutually Develop Transition Plan  Recent Flowsheet Documentation  Taken 3/21/2024 0016 by My Peraza RN  Equipment Currently Used at Home: none  Taken 3/20/2024 5088 by My Peraza RN  Transportation Anticipated: car, drives self  Patient/Family Anticipated Services at Transition: none  Patient/Family Anticipates Transition to: home     Problem: Adjustment to Role Transition (Postpartum Vaginal Delivery)  Goal:  Successful Maternal Role Transition  Outcome: Ongoing, Progressing     Problem: Bleeding (Postpartum Vaginal Delivery)  Goal: Hemostasis  Outcome: Ongoing, Progressing     Problem: Infection (Postpartum Vaginal Delivery)  Goal: Absence of Infection Signs/Symptoms  Outcome: Ongoing, Progressing     Problem: Pain (Postpartum Vaginal Delivery)  Goal: Acceptable Pain Control  Outcome: Ongoing, Progressing     Problem: Urinary Retention (Postpartum Vaginal Delivery)  Goal: Effective Urinary Elimination  Outcome: Ongoing, Progressing   Goal Outcome Evaluation:              Outcome Evaluation:  @39.5 weeks gestation, spontaneous labor with  @0546 viable female infant with 8/9 apgars. Patient thus far stable in the recovery period and will be moved to  after recovery is up

## 2024-03-21 NOTE — H&P (VIEW-ONLY)
[facility]  Anaya Conrad  : 2000  MRN: [id number]  CSN: [id number]    OB ED Provider Note    Subjective   Chief Complaint   Patient presents with    Contractions     SYLVIA  39.3 weeks presents with egular ctx since 2200 every 5-10 minutes apart, rating 7/10. Deneis any bleeding or leaking, reports +FM     Anaya Conrad is a 23 y.o. year old  with an Estimated Date of Delivery: 3/23/24 currently at 39w4d presenting with CTX since this morning, increasing in frequency and intenstiy between 2100 and 2200. She denies ROM or VB. FM is present.    Prenatal care has been with Dr. Rosales.  It has been complicated by covid during the gestation. She has a history of CHTN . There is a question of diet controlled GDM.    OB History    Para Term  AB Living   2 1 1 0 0 1   SAB IAB Ectopic Molar Multiple Live Births   0 0 0 0 0 1      # Outcome Date GA Lbr Joby/2nd Weight Sex Type Anes PTL Lv   2 Current            1 Term 21 38w6d 11:54 / 00:58 3013 g (6 lb 10.3 oz) F Vag-Spont EPI N BANDAR      Birth Comments: scale 4       Name: JEFFREY CONRAD      Apgar1: 8  Apgar5: 9     Past Medical History:   Diagnosis Date    Anemia     Gestational diabetes     diet control    Gestational hypertension     Hypertension     CHTN, no meds and hasn't had any issues since pregnancy    Migraines     UTI (urinary tract infection)     before pregnancy, not recurring     No past surgical history on file.  No current facility-administered medications for this encounter.    No Known Allergies  Social History    Tobacco Use      Smoking status: Former        Packs/day: 0.00        Years: 0.1 packs/day for 1 year (0.1 ttl pk-yrs)        Types: Cigarettes        Start date: 2019        Quit date: 2020        Years since quitting: 3.5      Smokeless tobacco: Never    Review of Systems   Gastrointestinal:  Positive for abdominal pain.   All other systems reviewed and are negative.        Objective  "  /84   Pulse 78   Temp 98.2 °F (36.8 °C) (Oral)   Resp 18   Ht 157.5 cm (62\")   Wt 63.5 kg (140 lb)   LMP 06/05/2023 (Approximate)   BMI 25.61 kg/m²   General: well developed; well nourished  moderately distressed   Abdomen: soft, non-tender; no masses  gravid    FHT's: nonreactive, elevated baseline, variable decelerations are present, and category 2      Cervix: was checked (by RN): 3.5 cm / 60 % / -2   Presentation: cephalic   Contractions: every 3-5 minutes   Chest: Unlabored respirations    CV:  RRR   Ext:   No C/C/E   Back: CVA tenderness is deferred bilateral        Prenatal Labs  Lab Results   Component Value Date    HGB 10.1 (L) 02/14/2024    RUBELLAABIGG 5.25 07/25/2023    HEPBSAG Negative 07/25/2023    ABSCRN Negative 07/25/2023    CGS2WGP6 Non Reactive 07/25/2023    HEPCVIRUSABY Non Reactive 07/25/2023     (H) 12/19/2023    GGTFASTING 77 01/02/2024    VVS9CMRK 154 01/02/2024    MND5MCJY 191 (H) 01/02/2024    HHE8DMQC 113 01/02/2024    STREPGPB Negative 02/27/2024    URINECX Final report 07/25/2023    CHLAMNAA Negative 02/27/2024    NGONORRHON Negative 02/27/2024       Current Labs Reviewed   UA:    Lab Results   Component Value Date    SQUAMEPIUA 0-2 03/18/2023    SPECGRAVUR >=1.030 03/18/2023    KETONESU Trace (A) 03/18/2023    BLOODU Negative 03/18/2023    LEUKOCYTESUR Negative 03/18/2023    NITRITEU Negative 03/18/2023    RBCUA 0-2 03/18/2023    WBCUA 31-50 (A) 03/18/2023    BACTERIA None Seen 03/18/2023          Assessment   IUP at 39w4d  Early active labor- GBS negative, desires epidural  Cat 2 NST     Plan   Admit to L&D for labs, fluid bolus, epidural. Dr. Ziegler notified and is assuming management.     Shruthi Tabor MD  3/20/2024  23:32 EDT     "

## 2024-03-21 NOTE — ANESTHESIA PREPROCEDURE EVALUATION
" Anesthesia Evaluation     Patient summary reviewed and Nursing notes reviewed   no history of anesthetic complications:                Airway   Mallampati: II  Dental      Pulmonary    (-) COPD, asthma  Cardiovascular   Exercise tolerance: good (4-7 METS)    Rhythm: regular    (+) hypertension  (-) past MI, angina, cardiac stents      Neuro/Psych  (-) seizures, CVA  GI/Hepatic/Renal/Endo    (+) diabetes mellitus gestational  (-) GERD, liver disease, no renal disease    Musculoskeletal     Abdominal    Substance History - negative use     OB/GYN    (+) Pregnant        Other - negative ROS                   Anesthesia Plan    ASA 2     epidural     (Intrauterine pregnancy at 39w5d    I have reviewed the patient's history and chart with the patient, including all pertinent laboratory results and imaging. I have explained the risks of anesthesia labor epidurals including, low blood pressure, PDPHA, bleeding, infection, or possible nerve injury. I discussed expectations for adequate pain control but the inability to eliminate all pain and discomfort. There is a risk of epidural failure and the need for replacement or reliance on alternatives.      The patient understands this is an elective procedure to assist with pain control agrees to proceed.     VITALS:  /84   Pulse 81   Temp 36.8 °C (98.2 °F) (Oral)   Resp 18   Ht 157.5 cm (62\")   Wt 63.5 kg (140 lb)   LMP 06/05/2023 (Approximate)   SpO2 100%   BMI 25.61 kg/m²   )    Anesthetic plan, risks, benefits, and alternatives have been provided, discussed and informed consent has been obtained with: patient.  Pre-procedure education provided    CODE STATUS:    Level Of Support Discussed With: Patient  Code Status (Patient has no pulse and is not breathing): CPR (Attempt to Resuscitate)  Medical Interventions (Patient has pulse or is breathing): Full Support      "

## 2024-03-21 NOTE — LACTATION NOTE
This note was copied from a baby's chart.  Baby is listed as formula feeing. Rounded on mom at this time and she confirmed it.

## 2024-03-21 NOTE — OBED NOTES
[facility]  Anaya Conrad  : 2000  MRN: [id number]  CSN: [id number]    OB ED Provider Note    Subjective   Chief Complaint   Patient presents with    Contractions     SYLVIA  39.3 weeks presents with egular ctx since 2200 every 5-10 minutes apart, rating 7/10. Deneis any bleeding or leaking, reports +FM     Anaya Conrad is a 23 y.o. year old  with an Estimated Date of Delivery: 3/23/24 currently at 39w4d presenting with CTX since this morning, increasing in frequency and intenstiy between 2100 and 2200. She denies ROM or VB. FM is present.    Prenatal care has been with Dr. Rosales.  It has been complicated by covid during the gestation. She has a history of CHTN . There is a question of diet controlled GDM.    OB History    Para Term  AB Living   2 1 1 0 0 1   SAB IAB Ectopic Molar Multiple Live Births   0 0 0 0 0 1      # Outcome Date GA Lbr Joby/2nd Weight Sex Type Anes PTL Lv   2 Current            1 Term 21 38w6d 11:54 / 00:58 3013 g (6 lb 10.3 oz) F Vag-Spont EPI N BANDAR      Birth Comments: scale 4       Name: JEFFREY CONRAD      Apgar1: 8  Apgar5: 9     Past Medical History:   Diagnosis Date    Anemia     Gestational diabetes     diet control    Gestational hypertension     Hypertension     CHTN, no meds and hasn't had any issues since pregnancy    Migraines     UTI (urinary tract infection)     before pregnancy, not recurring     No past surgical history on file.  No current facility-administered medications for this encounter.    No Known Allergies  Social History    Tobacco Use      Smoking status: Former        Packs/day: 0.00        Years: 0.1 packs/day for 1 year (0.1 ttl pk-yrs)        Types: Cigarettes        Start date: 2019        Quit date: 2020        Years since quitting: 3.5      Smokeless tobacco: Never    Review of Systems   Gastrointestinal:  Positive for abdominal pain.   All other systems reviewed and are negative.        Objective  "  /84   Pulse 78   Temp 98.2 °F (36.8 °C) (Oral)   Resp 18   Ht 157.5 cm (62\")   Wt 63.5 kg (140 lb)   LMP 06/05/2023 (Approximate)   BMI 25.61 kg/m²   General: well developed; well nourished  moderately distressed   Abdomen: soft, non-tender; no masses  gravid    FHT's: nonreactive, elevated baseline, variable decelerations are present, and category 2      Cervix: was checked (by RN): 3.5 cm / 60 % / -2   Presentation: cephalic   Contractions: every 3-5 minutes   Chest: Unlabored respirations    CV:  RRR   Ext:   No C/C/E   Back: CVA tenderness is deferred bilateral        Prenatal Labs  Lab Results   Component Value Date    HGB 10.1 (L) 02/14/2024    RUBELLAABIGG 5.25 07/25/2023    HEPBSAG Negative 07/25/2023    ABSCRN Negative 07/25/2023    MMR7DYY5 Non Reactive 07/25/2023    HEPCVIRUSABY Non Reactive 07/25/2023     (H) 12/19/2023    GGTFASTING 77 01/02/2024    SHN7WMMH 154 01/02/2024    WUL4MIZI 191 (H) 01/02/2024    QDF4KYED 113 01/02/2024    STREPGPB Negative 02/27/2024    URINECX Final report 07/25/2023    CHLAMNAA Negative 02/27/2024    NGONORRHON Negative 02/27/2024       Current Labs Reviewed   UA:    Lab Results   Component Value Date    SQUAMEPIUA 0-2 03/18/2023    SPECGRAVUR >=1.030 03/18/2023    KETONESU Trace (A) 03/18/2023    BLOODU Negative 03/18/2023    LEUKOCYTESUR Negative 03/18/2023    NITRITEU Negative 03/18/2023    RBCUA 0-2 03/18/2023    WBCUA 31-50 (A) 03/18/2023    BACTERIA None Seen 03/18/2023          Assessment   IUP at 39w4d  Early active labor- GBS negative, desires epidural  Cat 2 NST     Plan   Admit to L&D for labs, fluid bolus, epidural. Dr. Ziegler notified and is assuming management.     Shruthi Tabor MD  3/20/2024  23:32 EDT     "

## 2024-03-21 NOTE — ANESTHESIA POSTPROCEDURE EVALUATION
Patient: Anaya Conrad    Procedure Summary       Date: 03/21/24 Room / Location:     Anesthesia Start: 0023 Anesthesia Stop: 0546    Procedure: LABOR ANALGESIA Diagnosis:     Scheduled Providers:  Provider: Natasha Lizama MD    Anesthesia Type: epidural ASA Status: 2            Anesthesia Type: No value filed.    Vitals  Vitals Value Taken Time   /89 03/21/24 0630   Temp 37.2 °C (98.9 °F) 03/21/24 0600   Pulse 72 03/21/24 0639   Resp 16 03/21/24 0630   SpO2 100 % 03/21/24 0639   Vitals shown include unfiled device data.        Post Anesthesia Care and Evaluation    No anesthesia care post op

## 2024-03-21 NOTE — PLAN OF CARE
Problem: Adult Inpatient Plan of Care  Goal: Plan of Care Review  Outcome: Ongoing, Progressing  Flowsheets (Taken 3/21/2024 0015)  Outcome Evaluation:  @39.5 weeks spontaneous labor,  @0546 viable female infant 8/9 apgars, stable in recovery   Goal: Patient-Specific Goal (Individualized)  Outcome: Ongoing, Progressing  Flowsheets (Taken 3/21/2024 0015)  Anxieties, Fears or Concerns: no anxieties, none, none  Goal: Absence of Hospital-Acquired Illness or Injury  Outcome: Ongoing, Progressing  Goal: Optimal Comfort and Wellbeing  Outcome: Ongoing, Progressing  Goal: Readiness for Transition of Care  Outcome: Ongoing, Progressing  Intervention: Mutually Develop Transition Plan  Recent Flowsheet Documentation  Taken 3/21/2024 0016 by My Peraza, RN  Equipment Currently Used at Home: none  Taken 3/20/2024 2352 by My Peraza, RN  Transportation Anticipated: car, drives self  Patient/Family Anticipated Services at Transition: none  Patient/Family Anticipates Transition to: home     Problem: Bleeding (Labor)  Goal: Hemostasis  Outcome: Ongoing, Progressing   Goal Outcome Evaluation:              Outcome Evaluation:  @39.5 weeks spontaneous labor

## 2024-03-21 NOTE — PLAN OF CARE
Problem: Adult Inpatient Plan of Care  Goal: Plan of Care Review  Outcome: Ongoing, Progressing  Flowsheets (Taken 3/21/2024 001)  Outcome Evaluation:  @39.5 weeks spontaneous labor  Goal: Patient-Specific Goal (Individualized)  Outcome: Ongoing, Progressing  Flowsheets (Taken 3/21/2024 0015)  Anxieties, Fears or Concerns: no anxieties, none, none  Goal: Absence of Hospital-Acquired Illness or Injury  Outcome: Ongoing, Progressing  Goal: Optimal Comfort and Wellbeing  Outcome: Ongoing, Progressing  Goal: Readiness for Transition of Care  Outcome: Ongoing, Progressing  Intervention: Mutually Develop Transition Plan  Recent Flowsheet Documentation  Taken 3/20/2024 1916 by My Peraza RN  Transportation Anticipated: car, drives self  Patient/Family Anticipated Services at Transition: none  Patient/Family Anticipates Transition to: home     Problem: Bleeding (Labor)  Goal: Hemostasis  Outcome: Ongoing, Progressing     Problem: Change in Fetal Wellbeing (Labor)  Goal: Stable Fetal Wellbeing  Outcome: Ongoing, Progressing     Problem: Delayed Labor Progression (Labor)  Goal: Effective Progression to Delivery  Outcome: Ongoing, Progressing     Problem: Infection (Labor)  Goal: Absence of Infection Signs and Symptoms  Outcome: Ongoing, Progressing     Problem: Labor Pain (Labor)  Goal: Acceptable Pain Control  Outcome: Ongoing, Progressing     Problem: Uterine Tachysystole (Labor)  Goal: Normal Uterine Contraction Pattern  Outcome: Ongoing, Progressing   Goal Outcome Evaluation:              Outcome Evaluation:  @39.5 weeks spontaneous labor

## 2024-03-21 NOTE — L&D DELIVERY NOTE
[facility]   Vaginal Delivery Note    Patient Name: Anaya Conrad  : 2000  MRN: [id number]    Date of Delivery: 3/21/2024     Diagnosis     Pre & Post-Delivery:  Intrauterine pregnancy at 39w5d  Labor status: Spontaneous Onset of Labor      (normal spontaneous vaginal delivery)    Encounter for supervision of other normal pregnancy in third trimester             Problem List    Transfer to Postpartum     Review the Delivery Report for details.     Delivery     Delivery: Vaginal, Spontaneous     YOB: 2026    Time of Birth:  Gestational Age 5:46 AM   39w5d     Anesthesia: Epidural     Delivering clinician: Nidhi Ziegler    Forceps?   No   Vacuum? No    Shoulder dystocia present: No        Delivery narrative: Patient is a 23-year-old -0-0-1 who was admitted at 39 weeks and 4 days in labor.  She was 3 to 4 cm on admission and having painful contractions.  She was GBS negative.  Her pregnancy had been complicated by chronic hypertension that was well-controlled on no medications and diet-controlled gestational diabetes.  She was admitted and received an epidural.  She progressed along a normal  multiparous labor curve to complete dilation and +2 station.   she had spontaneous rupture of membranes for meconium stained fluid just prior to the beginning of pushing.    She pushed with 2 contractions and delivered a liveborn female in the occiput anterior position over an intact perineum.  With gentle posterior guidance of the fetal head and maternal pushing, the right anterior shoulder was easily delivered, followed by the remainder of the body.  The infant was immediately vigorous and placed on mom's chest.  Delayed cord clamping was performed for 30 seconds and cord was clamped and cut by the maternal grandmother.  Cord blood was collected and sent.  The placenta then delivered spontaneously and was intact with a three-vessel cord.  This was sent to pathology.  Inspection of  "her perineum revealed no lacerations.  Bimanual exam revealed a firm uterus with no remaining products of conception.  She tolerated the procedure well.  All counts were correct at the end of procedure.      Infant     Findings: female  infant     Infant observations: Weight: 3260 g (7 lb 3 oz)   Length: 20  in  Observations/Comments:  DULCE MARIA Moreno      Apgars: 8  @ 1 minute /    9  @ 5 minutes   Infant Name: Kassandra     Placenta & Cord         Placenta delivered  Spontaneous  at   3/21/2024  5:49 AM     Cord: 3 vessels  present.   Nuchal Cord?  no   Cord blood obtained: Yes    Cord gases obtained:  No    Cord gas results: Venous:  No results found for: \"PHCVEN\", \"BECVEN\"    Arterial:  No results found for: \"PHCART\", \"BECART\"     Repair     Episiotomy: None     No    Lacerations: No   Estimated Blood Loss:       Quantitative Blood Loss: Quantitative Blood Loss (mL): 153 mL (03/21/24 0600)        Complications     none    Disposition     Mother to Mother Baby/Postpartum  in stable condition currently.  Baby to remains with mom  in stable condition currently.    Nidhi Ziegler MD  03/21/24  11:01 EDT        "

## 2024-03-21 NOTE — ANESTHESIA PROCEDURE NOTES
Labor Epidural      Patient reassessed immediately prior to procedure    Patient location during procedure: OB  Performed By  Anesthesiologist: Natasha Lizama MD  Preanesthetic Checklist  Completed: patient identified, IV checked, site marked, risks and benefits discussed, surgical consent, monitors and equipment checked, pre-op evaluation and timeout performed  Prep:  Pt Position:sitting  Sterile Tech:cap, gloves, mask and sterile barrier  Prep:chlorhexidine gluconate and isopropyl alcohol  Monitoring:blood pressure monitoring and continuous pulse oximetry  Epidural Block Procedure:  Approach:midline  Guidance:landmark technique and palpation technique  Location:L4-L5  Needle Type:Tuohy  Needle Gauge:17 G  Loss of Resistance Medium: saline  Loss of Resistance: 5cm  Cath Depth at skin:10 cm  Paresthesia: none  Aspiration:negative  Test Dose:negative  Number of Attempts: 1  Post Assessment:  Dressing:occlusive dressing applied and secured with tape  Pt Tolerance:patient tolerated the procedure well with no apparent complications  Complications:no

## 2024-03-22 VITALS
BODY MASS INDEX: 25.76 KG/M2 | TEMPERATURE: 97 F | RESPIRATION RATE: 16 BRPM | HEIGHT: 62 IN | HEART RATE: 41 BPM | SYSTOLIC BLOOD PRESSURE: 129 MMHG | DIASTOLIC BLOOD PRESSURE: 81 MMHG | OXYGEN SATURATION: 100 % | WEIGHT: 140 LBS

## 2024-03-22 PROBLEM — Z34.83 ENCOUNTER FOR SUPERVISION OF OTHER NORMAL PREGNANCY IN THIRD TRIMESTER: Status: RESOLVED | Noted: 2024-03-20 | Resolved: 2024-03-22

## 2024-03-22 LAB
BASOPHILS # BLD AUTO: 0.02 10*3/MM3 (ref 0–0.2)
BASOPHILS NFR BLD AUTO: 0.4 % (ref 0–1.5)
DEPRECATED RDW RBC AUTO: 40.9 FL (ref 37–54)
EOSINOPHIL # BLD AUTO: 0.15 10*3/MM3 (ref 0–0.4)
EOSINOPHIL NFR BLD AUTO: 2.7 % (ref 0.3–6.2)
ERYTHROCYTE [DISTWIDTH] IN BLOOD BY AUTOMATED COUNT: 14.2 % (ref 12.3–15.4)
HCT VFR BLD AUTO: 28.3 % (ref 34–46.6)
HGB BLD-MCNC: 9 G/DL (ref 12–15.9)
IMM GRANULOCYTES # BLD AUTO: 0.04 10*3/MM3 (ref 0–0.05)
IMM GRANULOCYTES NFR BLD AUTO: 0.7 % (ref 0–0.5)
LYMPHOCYTES # BLD AUTO: 2.08 10*3/MM3 (ref 0.7–3.1)
LYMPHOCYTES NFR BLD AUTO: 38 % (ref 19.6–45.3)
MCH RBC QN AUTO: 25.1 PG (ref 26.6–33)
MCHC RBC AUTO-ENTMCNC: 31.8 G/DL (ref 31.5–35.7)
MCV RBC AUTO: 78.8 FL (ref 79–97)
MONOCYTES # BLD AUTO: 0.31 10*3/MM3 (ref 0.1–0.9)
MONOCYTES NFR BLD AUTO: 5.7 % (ref 5–12)
NEUTROPHILS NFR BLD AUTO: 2.87 10*3/MM3 (ref 1.7–7)
NEUTROPHILS NFR BLD AUTO: 52.5 % (ref 42.7–76)
NRBC BLD AUTO-RTO: 0 /100 WBC (ref 0–0.2)
PLATELET # BLD AUTO: 133 10*3/MM3 (ref 140–450)
PMV BLD AUTO: 12.6 FL (ref 6–12)
RBC # BLD AUTO: 3.59 10*6/MM3 (ref 3.77–5.28)
WBC NRBC COR # BLD AUTO: 5.47 10*3/MM3 (ref 3.4–10.8)

## 2024-03-22 PROCEDURE — 85025 COMPLETE CBC W/AUTO DIFF WBC: CPT | Performed by: OBSTETRICS & GYNECOLOGY

## 2024-03-22 PROCEDURE — 25010000002 MEDROXYPROGESTERONE 150 MG/ML SUSPENSION

## 2024-03-22 PROCEDURE — 0503F POSTPARTUM CARE VISIT: CPT

## 2024-03-22 RX ORDER — IBUPROFEN 600 MG/1
600 TABLET ORAL EVERY 6 HOURS PRN
Qty: 60 TABLET | Refills: 0 | Status: SHIPPED | OUTPATIENT
Start: 2024-03-22

## 2024-03-22 RX ORDER — PSEUDOEPHEDRINE HCL 30 MG
100 TABLET ORAL 2 TIMES DAILY
Qty: 60 CAPSULE | Refills: 0 | Status: SHIPPED | OUTPATIENT
Start: 2024-03-22

## 2024-03-22 RX ORDER — FERROUS SULFATE 325(65) MG
325 TABLET ORAL
Status: DISCONTINUED | OUTPATIENT
Start: 2024-03-22 | End: 2024-03-22 | Stop reason: HOSPADM

## 2024-03-22 RX ORDER — FERROUS SULFATE 325(65) MG
325 TABLET ORAL
Qty: 30 TABLET | Refills: 1 | Status: SHIPPED | OUTPATIENT
Start: 2024-03-22

## 2024-03-22 RX ORDER — MEDROXYPROGESTERONE ACETATE 150 MG/ML
150 INJECTION, SUSPENSION INTRAMUSCULAR ONCE
Status: COMPLETED | OUTPATIENT
Start: 2024-03-22 | End: 2024-03-22

## 2024-03-22 RX ADMIN — Medication 1 TABLET: at 08:44

## 2024-03-22 RX ADMIN — DOCUSATE SODIUM 100 MG: 100 CAPSULE, LIQUID FILLED ORAL at 08:44

## 2024-03-22 RX ADMIN — MEDROXYPROGESTERONE ACETATE 150 MG: 150 INJECTION, SUSPENSION INTRAMUSCULAR at 13:42

## 2024-03-22 RX ADMIN — IBUPROFEN 600 MG: 600 TABLET, FILM COATED ORAL at 02:42

## 2024-03-22 RX ADMIN — ACETAMINOPHEN 325MG 650 MG: 325 TABLET ORAL at 02:42

## 2024-03-22 RX ADMIN — IBUPROFEN 600 MG: 600 TABLET, FILM COATED ORAL at 11:20

## 2024-03-22 NOTE — PROGRESS NOTES
VAGINAL DELIVERY POSTPARTUM DAY 1    3/22/2024  PATIENT: Anaya Conard        MR#:[id number]  LOCATION: [facility]  DATE OF ADMISSION: 3/20/2024  ADMISSION  DIAGNOSIS:    (normal spontaneous vaginal delivery)    Encounter for supervision of other normal pregnancy in third trimester     CURRENT DIAGNOSIS: No notes have been filed under this hospital service.  Service: Hospitalist      SUBJECTIVE     Anaya feels well.  Patient describes her lochia as less than menses.  Pain is well controlled.  Anaya requests discharge later today.      OBJECTIVE   Temp: Temp:  [97 °F (36.1 °C)-98.2 °F (36.8 °C)] 97 °F (36.1 °C) Temp src: Oral   BP: BP: (123-129)/(73-81) 129/81        Pulse: Heart Rate:  [41-54] 41  RR: Resp:  [16] 16    General:  Awake, alert, no acute distress   Cardiac: Regular rate and rhythm    Respiratory: Lungs clear bilaterally, normal respiratory effort    Abdomen: Soft, non-distended, fundus firm, below umbilicus, appropriately tender   Pelvis: deferred   Extremities: Calves NT bilaterally, DTR 2+, no clonus noted, trace edema     Lab Results   Component Value Date    WBC 5.47 2024    HGB 9.0 (L) 2024    HCT 28.3 (L) 2024     (L) 2024    AST 15 2024    ALT 10 2024    CREATININE 0.55 (L) 2024       ASSESSMENT: Postpartum day 2 after vaginal delivery. Hgb: 9.0.    PLAN: Doing well. Discharge to home. Discharge instructions given, precautions reviewed. [place] up [place] [facility]  in 4 to 6 weeks for routine postpartum visit. Prescription for ibuprofen 600mg PO every 6 hours PRN for pain, docusate 100mg PO BID, and ferrous sulfate 325mg daily. Advised no tampons, menstrual cups, intercourse, or tub baths for 6 weeks.     Temitope Sidhu CNM  10:21 EDT  2024

## 2024-03-22 NOTE — DISCHARGE SUMMARY
VAGINAL DELIVERY DISCHARGE SUMMARY      PATIENT: Anaya Conrad        MR#:[id number]  LOCATION: [facility]  ADMISSION  DIAGNOSIS:    (normal spontaneous vaginal delivery)     DISCHARGE DIAGNOSIS:   1. Encounter for supervision of other normal pregnancy in third trimester          DATE OF ADMISSION: 3/20/2024  DATE OF DISCHARGE: 24     PROCEDURES:  Vaginal, Spontaneous     3/21/2024    5:46 AM      SERVICE: Obstetrics    HOSPITAL COURSE: Anaya underwent vaginal delivery of a female infant and remained in the hospital for 2 days. During that time, Anaya remained afebrile and hemodynamically stable. On the day of discharge, Anaya was eating, ambulating and voiding without difficulty. Anaya also requests a medroxyprogesterone injection prior to discharge for contraception management.      VARICELLA: non-immune - varicella immunization ordered to be given on postpartum prior to discharge.  RUBELLA: immune.    LABS:   Lab Results   Component Value Date    WBC 5.47 2024    HGB 9.0 (L) 2024    HCT 28.3 (L) 2024    MCV 78.8 (L) 2024     (L) 2024     (H) 2019    CREATININE 0.55 (L) 2024    AST 15 2024    ALT 10 2024     Results from last 7 days   Lab Units 24  2347   ABO TYPING  O   RH TYPING  Positive   ANTIBODY SCREEN  Negative       DISCHARGE MEDICATIONS     Discharge Medications        New Medications        Instructions Start Date   docusate sodium 100 MG capsule   100 mg, Oral, 2 Times Daily      ferrous sulfate 325 (65 FE) MG tablet   325 mg, Oral, Daily With Breakfast      ibuprofen 600 MG tablet  Commonly known as: ADVIL,MOTRIN   600 mg, Oral, Every 6 Hours PRN             Continue These Medications        Instructions Start Date   albuterol sulfate  (90 Base) MCG/ACT inhaler  Commonly known as: PROVENTIL HFA;VENTOLIN HFA;PROAIR HFA   2 puffs, Inhalation, Every 6 Hours PRN      aspirin 81 MG chewable tablet   81 mg,  Oral, Daily      Prenatal 28-0.8 MG tablet   1 tablet, Oral, Daily, Please use formulary or generic, with DHA ideal               DISCHARGE DISPOSITION: Home    DISCHARGE CONDITION: Stable    DISCHARGE DIET: Regular    ACTIVITY AT DISCHARGE: Pelvic rest    INFANT FEEDING PLANS: Bottle    EDUCATION: Warning signs and symptoms given, no tub baths, nothing in the vagina for 6 weeks.     FOLLOW-UP APPOINTMENTS: Follow up with [facility]  in 4 to 6 weeks for routine postpartum visit.     Temitope Sidhu CNM  03/22/24  10:24 EDT

## 2024-04-01 ENCOUNTER — MATERNAL SCREENING (OUTPATIENT)
Dept: CALL CENTER | Facility: HOSPITAL | Age: 24
End: 2024-04-01
Payer: COMMERCIAL

## 2024-04-01 NOTE — OUTREACH NOTE
Maternal Screening Survey      Flowsheet Row Responses   Facility patient discharged from? [facility]   Attempt successful? No   Unsuccessful attempts Attempt 2              Matt DALLAS - Registered Nurse

## 2024-04-01 NOTE — OUTREACH NOTE
Maternal Screening Survey      Flowsheet Row Responses   Facility patient discharged from? [facility]   Attempt successful? No   Unsuccessful attempts Attempt 1              Matt DALLAS - Registered Nurse

## 2024-04-02 ENCOUNTER — MATERNAL SCREENING (OUTPATIENT)
Dept: CALL CENTER | Facility: HOSPITAL | Age: 24
End: 2024-04-02
Payer: COMMERCIAL

## 2024-04-02 NOTE — OUTREACH NOTE
Maternal Screening Survey      Flowsheet Row Responses   Facility patient discharged from? [facility]   Attempt successful? Yes   Call start time    Call end time 1200   Person spoke with today (if not patient) and relationship patient   EPD Scale: Able to Laugh 0-->as much as she always could   EPD Scale: Looked Forward 0-->as much as she ever did   EPD Scale: Blamed Self 0-->no, never   EPD Scale: Been Anxious 0-->no, not at all   EPD Scale: Felt Panicky 0-->no, not at all   EPD Scale: Things Getting on Top 0-->no, has been coping as well as ever   EPD Scale: Difficulty Sleeping 0-->no, not at all   EPD Scale: Sad or Miserable 0-->no, not at all   EPD Scale: Crying 0-->no, never   EPD Scale: Thought of Harming Self 0-->never   [place]  Depression Score 0   Did any of your parents have problems with alcohol or drug use? No   Do any of your peers have problems with alcohol or drug use? No   Does your partner have problems with alcohol or drug use? No   Before you were pregnant did you have problems with alcohol or drug use? (past) No   In the past month, did you drink beer, wine, liquor or use any other drugs? (pregnancy) No   Maternal Screening call completed Yes   Call end time 1200              Matt DALLAS - Registered Nurse

## 2024-05-07 ENCOUNTER — POSTPARTUM VISIT (OUTPATIENT)
Dept: OBSTETRICS AND GYNECOLOGY | Facility: CLINIC | Age: 24
End: 2024-05-07
Payer: COMMERCIAL

## 2024-05-07 VITALS
SYSTOLIC BLOOD PRESSURE: 132 MMHG | HEIGHT: 62 IN | BODY MASS INDEX: 22.08 KG/M2 | HEART RATE: 80 BPM | DIASTOLIC BLOOD PRESSURE: 85 MMHG | WEIGHT: 120 LBS

## 2024-05-07 DIAGNOSIS — Z11.3 SCREEN FOR STD (SEXUALLY TRANSMITTED DISEASE): ICD-10-CM

## 2024-05-07 DIAGNOSIS — Z30.42 ENCOUNTER FOR SURVEILLANCE OF INJECTABLE CONTRACEPTIVE: ICD-10-CM

## 2024-05-07 RX ORDER — MEDROXYPROGESTERONE ACETATE 150 MG/ML
150 INJECTION, SUSPENSION INTRAMUSCULAR
Qty: 1 ML | Refills: 3 | Status: SHIPPED | OUTPATIENT
Start: 2024-05-07

## 2024-05-09 ENCOUNTER — TELEPHONE (OUTPATIENT)
Dept: OBSTETRICS AND GYNECOLOGY | Facility: CLINIC | Age: 24
End: 2024-05-09
Payer: COMMERCIAL

## 2024-06-10 ENCOUNTER — CLINICAL SUPPORT (OUTPATIENT)
Dept: OBSTETRICS AND GYNECOLOGY | Facility: CLINIC | Age: 24
End: 2024-06-10
Payer: COMMERCIAL

## 2024-06-10 VITALS — DIASTOLIC BLOOD PRESSURE: 86 MMHG | BODY MASS INDEX: 21.95 KG/M2 | WEIGHT: 120 LBS | SYSTOLIC BLOOD PRESSURE: 136 MMHG

## 2024-06-10 DIAGNOSIS — Z78.9 USES BIRTH CONTROL: Primary | ICD-10-CM

## 2024-06-10 PROCEDURE — 96372 THER/PROPH/DIAG INJ SC/IM: CPT | Performed by: OBSTETRICS & GYNECOLOGY

## 2024-06-10 RX ORDER — MEDROXYPROGESTERONE ACETATE 150 MG/ML
150 INJECTION, SUSPENSION INTRAMUSCULAR ONCE
Status: COMPLETED | OUTPATIENT
Start: 2024-06-10 | End: 2024-06-10

## 2024-06-10 RX ADMIN — MEDROXYPROGESTERONE ACETATE 150 MG: 150 INJECTION, SUSPENSION INTRAMUSCULAR at 14:59

## 2024-09-06 ENCOUNTER — CLINICAL SUPPORT (OUTPATIENT)
Dept: OBSTETRICS AND GYNECOLOGY | Facility: CLINIC | Age: 24
End: 2024-09-06
Payer: COMMERCIAL

## 2024-09-06 DIAGNOSIS — Z30.013 ENCOUNTER FOR INITIAL PRESCRIPTION OF INJECTABLE CONTRACEPTIVE: Primary | ICD-10-CM

## 2024-09-06 RX ORDER — MEDROXYPROGESTERONE ACETATE 150 MG/ML
150 INJECTION, SUSPENSION INTRAMUSCULAR ONCE
Status: COMPLETED | OUTPATIENT
Start: 2024-09-06 | End: 2024-09-06

## 2024-09-06 RX ADMIN — MEDROXYPROGESTERONE ACETATE 150 MG: 150 INJECTION, SUSPENSION INTRAMUSCULAR at 13:44

## 2024-11-07 ENCOUNTER — TELEPHONE (OUTPATIENT)
Dept: OBSTETRICS AND GYNECOLOGY | Facility: CLINIC | Age: 24
End: 2024-11-07
Payer: COMMERCIAL

## 2024-11-07 NOTE — TELEPHONE ENCOUNTER
Dr Rosales said that if you are bleeding unexpectedly on birth control we typically recommend evaluation in office to make sure no treatable reason for this and can discuss change of birth control from there. He said you are still right under 6 months on Depo and it can be normal to have irregular bleeding during that initial timeframe. Please let me know if you would like me to help you schedule an appointment or you can call the office at [phone]. My Chart message sent.

## 2024-11-07 NOTE — TELEPHONE ENCOUNTER
Sydney,     If bleeding unexpectedly on birth control we typically recommend evaluation in office to make sure no treatable reason for this and can discuss change of birth control from there. She is still right under 6 months on Depo and it can be normal to have irregular bleeding during that initial timeframe.     Thanks,   Dr. Bobby ROSE 5/7/24 Started Depo, received 6/10/24 & 9/6/24. Will receive next depo end of 11/27/24. When she received her shot in Sept, started to bleed 2 wks ago and has not stopped. Wants to know if she should switch birth control or what she can do. Ivette. Thank you         Note         Anaya AGGARWAL [facility] (supporting You)3 hours ago (11:27 AM)       Good morning, I was just wondering if there was anything I could do about bleeding longer than normal on the depo shot ? I got my 3rd shot in September and didn’t bleed up until about 2 weeks ago but once I started bleeding I haven’t stopped. Is there anything I can do or should I switch to a different type of birth control? Thanks in advance

## 2024-11-27 ENCOUNTER — CLINICAL SUPPORT (OUTPATIENT)
Dept: OBSTETRICS AND GYNECOLOGY | Facility: CLINIC | Age: 24
End: 2024-11-27
Payer: COMMERCIAL

## 2024-11-27 VITALS — WEIGHT: 124 LBS | SYSTOLIC BLOOD PRESSURE: 130 MMHG | BODY MASS INDEX: 22.68 KG/M2 | DIASTOLIC BLOOD PRESSURE: 80 MMHG

## 2024-11-27 DIAGNOSIS — Z30.013 ENCOUNTER FOR INITIAL PRESCRIPTION OF INJECTABLE CONTRACEPTIVE: Primary | ICD-10-CM

## 2024-11-27 RX ORDER — MEDROXYPROGESTERONE ACETATE 150 MG/ML
150 INJECTION, SUSPENSION INTRAMUSCULAR ONCE
Status: COMPLETED | OUTPATIENT
Start: 2024-11-27 | End: 2024-11-27

## 2024-11-27 RX ADMIN — MEDROXYPROGESTERONE ACETATE 150 MG: 150 INJECTION, SUSPENSION INTRAMUSCULAR at 14:59

## 2025-02-17 ENCOUNTER — CLINICAL SUPPORT (OUTPATIENT)
Dept: OBSTETRICS AND GYNECOLOGY | Facility: CLINIC | Age: 25
End: 2025-02-17
Payer: COMMERCIAL

## 2025-02-17 VITALS — BODY MASS INDEX: 23.55 KG/M2 | HEIGHT: 62 IN | WEIGHT: 128 LBS

## 2025-02-17 DIAGNOSIS — Z30.42 ENCOUNTER FOR SURVEILLANCE OF INJECTABLE CONTRACEPTIVE: Primary | ICD-10-CM

## 2025-02-17 RX ORDER — MEDROXYPROGESTERONE ACETATE 150 MG/ML
150 INJECTION, SUSPENSION INTRAMUSCULAR ONCE
Status: COMPLETED | OUTPATIENT
Start: 2025-02-17 | End: 2025-02-17

## 2025-02-17 RX ADMIN — MEDROXYPROGESTERONE ACETATE 150 MG: 150 INJECTION, SUSPENSION INTRAMUSCULAR at 15:56

## 2025-05-02 RX ORDER — MEDROXYPROGESTERONE ACETATE 150 MG/ML
INJECTION, SUSPENSION INTRAMUSCULAR
Qty: 1 ML | Refills: 3 | Status: SHIPPED | OUTPATIENT
Start: 2025-05-02

## 2025-05-05 ENCOUNTER — CLINICAL SUPPORT (OUTPATIENT)
Dept: OBSTETRICS AND GYNECOLOGY | Facility: CLINIC | Age: 25
End: 2025-05-05
Payer: COMMERCIAL

## 2025-05-05 VITALS — HEIGHT: 62 IN | BODY MASS INDEX: 22.82 KG/M2 | WEIGHT: 124 LBS

## 2025-05-05 DIAGNOSIS — Z30.42 ENCOUNTER FOR SURVEILLANCE OF INJECTABLE CONTRACEPTIVE: Primary | ICD-10-CM

## 2025-05-05 PROCEDURE — 96372 THER/PROPH/DIAG INJ SC/IM: CPT | Performed by: OBSTETRICS & GYNECOLOGY

## 2025-05-05 RX ORDER — MEDROXYPROGESTERONE ACETATE 150 MG/ML
150 INJECTION, SUSPENSION INTRAMUSCULAR ONCE
Status: COMPLETED | OUTPATIENT
Start: 2025-05-05 | End: 2025-05-05

## 2025-05-05 RX ADMIN — MEDROXYPROGESTERONE ACETATE 150 MG: 150 INJECTION, SUSPENSION INTRAMUSCULAR at 15:38

## 2025-05-05 NOTE — PROGRESS NOTES
Pt here today for pt supplied depo injection, tolerated without a reaction. Lt MADELIN  NDC:87192-736-05  LOT:[id number]  EXP:12/2026

## 2025-05-30 ENCOUNTER — TELEMEDICINE (OUTPATIENT)
Dept: FAMILY MEDICINE CLINIC | Facility: TELEHEALTH | Age: 25
End: 2025-05-30
Payer: COMMERCIAL

## 2025-05-30 DIAGNOSIS — R30.0 DYSURIA: Primary | ICD-10-CM

## 2025-05-30 PROCEDURE — 99213 OFFICE O/P EST LOW 20 MIN: CPT | Performed by: NURSE PRACTITIONER

## 2025-05-30 PROCEDURE — 1159F MED LIST DOCD IN RCRD: CPT | Performed by: NURSE PRACTITIONER

## 2025-05-30 PROCEDURE — 1160F RVW MEDS BY RX/DR IN RCRD: CPT | Performed by: NURSE PRACTITIONER

## 2025-05-30 RX ORDER — NITROFURANTOIN 25; 75 MG/1; MG/1
100 CAPSULE ORAL 2 TIMES DAILY
Qty: 10 CAPSULE | Refills: 0 | Status: SHIPPED | OUTPATIENT
Start: 2025-05-30 | End: 2025-06-04

## 2025-05-30 RX ORDER — PHENAZOPYRIDINE HYDROCHLORIDE 200 MG/1
200 TABLET, FILM COATED ORAL 3 TIMES DAILY PRN
Qty: 6 TABLET | Refills: 0 | Status: SHIPPED | OUTPATIENT
Start: 2025-05-30 | End: 2025-06-01

## 2025-05-30 RX ORDER — FLUCONAZOLE 150 MG/1
150 TABLET ORAL
Qty: 2 TABLET | Refills: 0 | Status: SHIPPED | OUTPATIENT
Start: 2025-05-30

## 2025-05-30 NOTE — PROGRESS NOTES
MOHIT  Anaya Conrad is a 24 y.o. female  presents with complaint of symptoms starting two day ago including pain at end of urination, frequency, urgency, lower abd cramps. Denies fever, chills, sweats, discharge, itching. Has had UTIs in the past and this feels similar.     Review of Systems    Past Medical History:   Diagnosis Date    Anemia     Gestational diabetes     diet control    Gestational hypertension     Hypertension     CHTN, no meds and hasn't had any issues since pregnancy    Migraines     UTI (urinary tract infection)     before pregnancy, not recurring       Family History   Problem Relation Age of Onset    Diabetes Mother     Breast cancer Neg Hx     Ovarian cancer Neg Hx     Uterine cancer Neg Hx     Colon cancer Neg Hx     Deep vein thrombosis Neg Hx     Pulmonary embolism Neg Hx        Social History     Socioeconomic History    Marital status: Single   Tobacco Use    Smoking status: Every Day     Current packs/day: 0.00     Average packs/day: 0.1 packs/day for 1 year (0.1 ttl pk-yrs)     Types: Cigarettes     Start date: 2019     Last attempt to quit: 2020     Years since quittin.7    Smokeless tobacco: Never   Vaping Use    Vaping status: Never Used   Substance and Sexual Activity    Alcohol use: Not Currently    Drug use: No    Sexual activity: Yes     Partners: Male         There were no vitals taken for this visit.    PHYSICAL EXAM  Physical Exam   Constitutional: She appears well-developed and well-nourished.   HENT:   Head: Normocephalic.   Nose: Nose normal.   Neck: Neck normal appearance.  Pulmonary/Chest: Effort normal.   Neurological: She is alert.   Psychiatric: She has a normal mood and affect. Her speech is normal.       Diagnoses and all orders for this visit:    1. Dysuria (Primary)  -     nitrofurantoin, macrocrystal-monohydrate, (Macrobid) 100 MG capsule; Take 1 capsule by mouth 2 (Two) Times a Day for 5 days.  Dispense: 10 capsule; Refill: 0  -      phenazopyridine (Pyridium) 200 MG tablet; Take 1 tablet by mouth 3 (Three) Times a Day As Needed for Bladder Spasms for up to 2 days.  Dispense: 6 tablet; Refill: 0  -     fluconazole (Diflucan) 150 MG tablet; Take 1 tablet by mouth Every 72 (Seventy-Two) Hours As Needed (yeast).  Dispense: 2 tablet; Refill: 0          FOLLOW-UP  As discussed during visit with [facility], if symptoms worsen or fail to improve, follow-up with PCP/Urgent Care/Emergency Department.    Patient verbalizes understanding of medications, instructions for treatment and follow-up.    Laura Cortez, APRN  05/30/2025  17:15 EDT      Mode of Visit: Video  Location of patient: -HOME-  Location of provider: Home  You have chosen to receive care through a telehealth visit.  The patient has signed the video visit consent form.  The visit included audio and video interaction. No technical issues occurred during this visit.

## 2025-06-09 ENCOUNTER — OFFICE VISIT (OUTPATIENT)
Dept: OBSTETRICS AND GYNECOLOGY | Facility: CLINIC | Age: 25
End: 2025-06-09
Payer: COMMERCIAL

## 2025-06-09 VITALS
HEART RATE: 160 BPM | WEIGHT: 120 LBS | SYSTOLIC BLOOD PRESSURE: 153 MMHG | HEIGHT: 62 IN | DIASTOLIC BLOOD PRESSURE: 87 MMHG | BODY MASS INDEX: 22.08 KG/M2

## 2025-06-09 DIAGNOSIS — Z11.3 SCREEN FOR STD (SEXUALLY TRANSMITTED DISEASE): ICD-10-CM

## 2025-06-09 DIAGNOSIS — Z30.42 ENCOUNTER FOR SURVEILLANCE OF INJECTABLE CONTRACEPTIVE: ICD-10-CM

## 2025-06-09 DIAGNOSIS — Z01.419 ENCOUNTER FOR GYNECOLOGICAL EXAMINATION WITHOUT ABNORMAL FINDING: Primary | ICD-10-CM

## 2025-06-09 RX ORDER — MEDROXYPROGESTERONE ACETATE 150 MG/ML
150 INJECTION, SUSPENSION INTRAMUSCULAR
Qty: 1 ML | Refills: 3 | Status: SHIPPED | OUTPATIENT
Start: 2025-06-09

## 2025-06-09 NOTE — PROGRESS NOTES
GYN Annual Exam     CC- Here for annual exam.     Anaya Conrad is a 24 y.o. female who presents for annual well woman exam. Periods are rare due to Depo.     OB History          2    Para   2    Term   2       0    AB   0    Living   2         SAB   0    IAB   0    Ectopic   0    Molar   0    Multiple   0    Live Births   2                Current contraception: Depo-Provera injections  History of abnormal Pap smear: no  Family history of uterine, colon or ovarian cancer: no  History of abnormal mammogram: no  Family history of breast cancer: no  Pap : 2023 NIL       Past Medical History:   Diagnosis Date    Anemia     Gestational diabetes     diet control    Gestational hypertension     Hypertension     CHTN, no meds and hasn't had any issues since pregnancy    Migraines     UTI (urinary tract infection)     before pregnancy, not recurring       History reviewed. No pertinent surgical history.      Current Outpatient Medications:     medroxyPROGESTERone (DEPO-PROVERA) 150 MG/ML injection, Inject 1 mL into the appropriate muscle as directed by prescriber Every 3 (Three) Months., Disp: 1 mL, Rfl: 3    No Known Allergies    Social History     Tobacco Use    Smoking status: Former     Current packs/day: 0.00     Average packs/day: 0.1 packs/day for 1 year (0.1 ttl pk-yrs)     Types: Cigarettes     Start date: 2019     Quit date: 2020     Years since quittin.7    Smokeless tobacco: Never   Vaping Use    Vaping status: Never Used   Substance Use Topics    Alcohol use: Not Currently    Drug use: No       Family History   Problem Relation Age of Onset    Diabetes Mother     Breast cancer Neg Hx     Ovarian cancer Neg Hx     Uterine cancer Neg Hx     Colon cancer Neg Hx     Deep vein thrombosis Neg Hx     Pulmonary embolism Neg Hx        Review of Systems   Constitutional:  Negative for chills and fever.   Gastrointestinal:  Negative for abdominal pain, constipation and diarrhea.  "  Genitourinary:  Negative for menstrual problem, pelvic pain, vaginal bleeding and vaginal discharge.   All other systems reviewed and are negative.      /87   Pulse (!) 160   Ht 157.5 cm (62\")   Wt 54.4 kg (120 lb)   BMI 21.95 kg/m²     Physical Exam  Constitutional:       General: She is not in acute distress.     Appearance: She is well-developed and normal weight.   Genitourinary:      Vulva normal.      Right Labia: No lesions or Bartholin's cyst.     Left Labia: No lesions or Bartholin's cyst.     No inguinal adenopathy present in the right or left side.     No vaginal discharge or bleeding.        Right Adnexa: not tender, not full and no mass present.     Left Adnexa: not tender, not full and no mass present.     No cervical motion tenderness or friability.      Uterus is not enlarged or tender.      No uterine mass detected.     Uterus is anteverted.   Breasts:     Right: No inverted nipple, mass or nipple discharge.      Left: No inverted nipple, mass or nipple discharge.   HENT:      Head: Normocephalic and atraumatic.      Nose: Nose normal.   Eyes:      Conjunctiva/sclera: Conjunctivae normal.      Pupils: Pupils are equal, round, and reactive to light.   Neck:      Thyroid: No thyromegaly.   Cardiovascular:      Rate and Rhythm: Normal rate and regular rhythm.      Heart sounds: Normal heart sounds. No murmur heard.  Pulmonary:      Effort: Pulmonary effort is normal. No respiratory distress.      Breath sounds: Normal breath sounds.   Abdominal:      General: Abdomen is flat. There is no distension.      Palpations: Abdomen is soft.      Tenderness: There is no abdominal tenderness.   Musculoskeletal:         General: No deformity. Normal range of motion.      Cervical back: Normal range of motion and neck supple.      Right lower leg: No edema.      Left lower leg: No edema.   Lymphadenopathy:      Lower Body: No right inguinal adenopathy. No left inguinal adenopathy.   Neurological:      " Mental Status: She is alert and oriented to person, place, and time.   Skin:     General: Skin is warm and dry.      Findings: No erythema.   Psychiatric:         Behavior: Behavior normal.         Thought Content: Thought content normal.         Judgment: Judgment normal.   Vitals reviewed. Exam conducted with a chaperone present.               Assessment     Diagnoses and all orders for this visit:    1. Encounter for gynecological examination without abnormal finding (Primary)  -     IGP, Rfx Aptima HPV ASCU    2. Screen for STD (sexually transmitted disease)  -     Chlamydia trachomatis, Neisseria gonorrhoeae, Trichomonas vaginalis, PCR - Swab, Cervix    3. Encounter for surveillance of injectable contraceptive    Other orders  -     medroxyPROGESTERone (DEPO-PROVERA) 150 MG/ML injection; Inject 1 mL into the appropriate muscle as directed by prescriber Every 3 (Three) Months.  Dispense: 1 mL; Refill: 3    1) GYN and STD screen   Expectations reviewed     2) continue DMPA  Rx sent      Plan     1) Breast Health - Clinical breast exam yearly, Discussed American cancer society recommendations for breast cancer screening, and Self breast awareness monthly  CBE updated and normal   2) Pap - updated, expectations reviewed.   Check BP with PCP in next 2-3 months   3) Smoking status- non-smoker   4) Eat a balanced diet including regular green vegetables.   5) Follow up prn and one year.       Gary Rosales MD   6/9/2025  12:12 EDT

## 2025-06-11 ENCOUNTER — RESULTS FOLLOW-UP (OUTPATIENT)
Dept: OBSTETRICS AND GYNECOLOGY | Facility: CLINIC | Age: 25
End: 2025-06-11
Payer: COMMERCIAL

## 2025-06-11 LAB
CONV .: NORMAL
CYTOLOGIST CVX/VAG CYTO: NORMAL
CYTOLOGY CVX/VAG DOC CYTO: NORMAL
CYTOLOGY CVX/VAG DOC THIN PREP: NORMAL
DX ICD CODE: NORMAL
OTHER STN SPEC: NORMAL
SERVICE CMNT-IMP: NORMAL
STAT OF ADQ CVX/VAG CYTO-IMP: NORMAL

## 2025-07-18 ENCOUNTER — TELEPHONE (OUTPATIENT)
Dept: OBSTETRICS AND GYNECOLOGY | Facility: CLINIC | Age: 25
End: 2025-07-18
Payer: COMMERCIAL

## 2025-07-18 RX ORDER — MEDROXYPROGESTERONE ACETATE 150 MG/ML
INJECTION, SUSPENSION INTRAMUSCULAR
Qty: 1 ML | Refills: 3 | Status: SHIPPED | OUTPATIENT
Start: 2025-07-18 | End: 2025-07-21 | Stop reason: SDUPTHER

## 2025-07-18 RX ORDER — MEDROXYPROGESTERONE ACETATE 150 MG/ML
150 INJECTION, SUSPENSION INTRAMUSCULAR
Qty: 1 ML | Refills: 3 | Status: SHIPPED | OUTPATIENT
Start: 2025-07-18 | End: 2025-07-18 | Stop reason: SDUPTHER

## 2025-07-18 NOTE — TELEPHONE ENCOUNTER
Salvador     Per phone request the following has been done         New Medications Ordered This Visit   Medications    medroxyPROGESTERone (DEPO-PROVERA) 150 MG/ML injection     Sig: Every 75 days     Dispense:  1 mL     Refill:  3       No orders of the defined types were placed in this encounter.         Thanks,   Gary Rosales MD  7/18/2025  12:41 EDT  Pt is scheduled for depo injection on 7/22. However when Pt attempted to get refill for medroxyPROGESTERone (DEPO-PROVERA) 150 MG/ML injection the pharmacy informed Pt that it was too early to refill prescription. Pharmacy states in order to refill prescription it has to be rewritten for 75 days instead of 90 days for insurance to pay for prescription. The 90 days would put it outside the window for injection.

## 2025-07-21 ENCOUNTER — TELEPHONE (OUTPATIENT)
Dept: OBSTETRICS AND GYNECOLOGY | Facility: CLINIC | Age: 25
End: 2025-07-21
Payer: COMMERCIAL

## 2025-07-21 RX ORDER — MEDROXYPROGESTERONE ACETATE 150 MG/ML
INJECTION, SUSPENSION INTRAMUSCULAR
Qty: 1 ML | Refills: 3 | Status: SHIPPED | OUTPATIENT
Start: 2025-07-21

## 2025-07-21 NOTE — TELEPHONE ENCOUNTER
Are you able to switch pt's depo prescription to updated [facility] please? Thanks!    Pharmacy -   [id number] - [place], [place] - [address] AT [facility] & [facility] - [phone] [facility] [phone] FX

## 2025-07-21 NOTE — TELEPHONE ENCOUNTER
[facility] RX SENT    Medication Name: Diclofenac Gel 1%    No prior authorization is needed, Medication Prior Authorization team will close this encounter. Pharmacy contacted - received paid claim for Medicaid's preferred brand name Voltaren Gel. Pharmacy will contact patient when medication is ready to be picked up.

## 2025-07-22 ENCOUNTER — CLINICAL SUPPORT (OUTPATIENT)
Dept: OBSTETRICS AND GYNECOLOGY | Facility: CLINIC | Age: 25
End: 2025-07-22
Payer: COMMERCIAL

## 2025-07-22 VITALS
HEIGHT: 62 IN | BODY MASS INDEX: 22.45 KG/M2 | WEIGHT: 122 LBS | DIASTOLIC BLOOD PRESSURE: 87 MMHG | HEART RATE: 61 BPM | SYSTOLIC BLOOD PRESSURE: 129 MMHG

## 2025-07-22 DIAGNOSIS — Z30.42 ENCOUNTER FOR SURVEILLANCE OF INJECTABLE CONTRACEPTIVE: Primary | ICD-10-CM

## 2025-07-22 RX ORDER — MEDROXYPROGESTERONE ACETATE 150 MG/ML
150 INJECTION, SUSPENSION INTRAMUSCULAR ONCE
Status: COMPLETED | OUTPATIENT
Start: 2025-07-22 | End: 2025-07-22

## 2025-07-22 RX ADMIN — MEDROXYPROGESTERONE ACETATE 150 MG: 150 INJECTION, SUSPENSION INTRAMUSCULAR at 12:59
